# Patient Record
Sex: FEMALE | Race: WHITE | NOT HISPANIC OR LATINO | Employment: OTHER | URBAN - METROPOLITAN AREA
[De-identification: names, ages, dates, MRNs, and addresses within clinical notes are randomized per-mention and may not be internally consistent; named-entity substitution may affect disease eponyms.]

---

## 2023-04-25 ENCOUNTER — HOSPITAL ENCOUNTER (EMERGENCY)
Facility: CLINIC | Age: 73
Discharge: HOME | End: 2023-04-25
Attending: EMERGENCY MEDICINE | Admitting: EMERGENCY MEDICINE
Payer: MEDICARE

## 2023-04-25 ENCOUNTER — APPOINTMENT (OUTPATIENT)
Dept: CT IMAGING | Facility: CLINIC | Age: 73
End: 2023-04-25
Attending: EMERGENCY MEDICINE
Payer: MEDICARE

## 2023-04-25 ENCOUNTER — APPOINTMENT (OUTPATIENT)
Dept: RADIOLOGY | Facility: CLINIC | Age: 73
End: 2023-04-25
Attending: EMERGENCY MEDICINE
Payer: MEDICARE

## 2023-04-25 VITALS
SYSTOLIC BLOOD PRESSURE: 154 MMHG | WEIGHT: 143 LBS | TEMPERATURE: 99 F | DIASTOLIC BLOOD PRESSURE: 70 MMHG | HEIGHT: 63 IN | RESPIRATION RATE: 20 BRPM | HEART RATE: 72 BPM | OXYGEN SATURATION: 94 % | BODY MASS INDEX: 25.34 KG/M2

## 2023-04-25 DIAGNOSIS — J06.9 UPPER RESPIRATORY TRACT INFECTION, UNSPECIFIED TYPE: ICD-10-CM

## 2023-04-25 LAB
ALBUMIN UR-MCNC: NEGATIVE MG/DL
APPEARANCE UR: CLEAR
BILIRUB UR QL STRIP: NEGATIVE
COLOR UR AUTO: COLORLESS
ERYTHROCYTE [DISTWIDTH] IN BLOOD BY AUTOMATED COUNT: 13 % (ref 10–15)
FLUAV RNA SPEC QL NAA+PROBE: NEGATIVE
FLUBV RNA RESP QL NAA+PROBE: NEGATIVE
GLUCOSE UR STRIP-MCNC: NEGATIVE MG/DL
HCT VFR BLD AUTO: 37.3 % (ref 35–47)
HGB BLD-MCNC: 12.6 G/DL (ref 11.7–15.7)
HGB UR QL STRIP: NEGATIVE
KETONES UR STRIP-MCNC: NEGATIVE MG/DL
LEUKOCYTE ESTERASE UR QL STRIP: NEGATIVE
MCH RBC QN AUTO: 30.2 PG (ref 26.5–33)
MCHC RBC AUTO-ENTMCNC: 33.8 G/DL (ref 31.5–36.5)
MCV RBC AUTO: 89 FL (ref 78–100)
NITRATE UR QL: NEGATIVE
PH UR STRIP: 7.5 [PH] (ref 5–7)
PLATELET # BLD AUTO: 348 10E3/UL (ref 150–450)
RBC # BLD AUTO: 4.17 10E6/UL (ref 3.8–5.2)
RBC URINE: <1 /HPF
RSV RNA SPEC NAA+PROBE: NEGATIVE
SARS-COV-2 RNA RESP QL NAA+PROBE: NEGATIVE
SP GR UR STRIP: 1 (ref 1–1.03)
SQUAMOUS EPITHELIAL: <1 /HPF
TROPONIN I SERPL-MCNC: 0.01 NG/ML (ref 0–0.29)
UROBILINOGEN UR STRIP-MCNC: <2 MG/DL
WBC # BLD AUTO: 7.2 10E3/UL (ref 4–11)
WBC URINE: 3 /HPF

## 2023-04-25 PROCEDURE — C9803 HOPD COVID-19 SPEC COLLECT: HCPCS

## 2023-04-25 PROCEDURE — 96360 HYDRATION IV INFUSION INIT: CPT

## 2023-04-25 PROCEDURE — 36415 COLL VENOUS BLD VENIPUNCTURE: CPT | Performed by: EMERGENCY MEDICINE

## 2023-04-25 PROCEDURE — 85027 COMPLETE CBC AUTOMATED: CPT | Performed by: EMERGENCY MEDICINE

## 2023-04-25 PROCEDURE — 99285 EMERGENCY DEPT VISIT HI MDM: CPT | Mod: 25,CS

## 2023-04-25 PROCEDURE — G1010 CDSM STANSON: HCPCS

## 2023-04-25 PROCEDURE — 96361 HYDRATE IV INFUSION ADD-ON: CPT

## 2023-04-25 PROCEDURE — 93005 ELECTROCARDIOGRAM TRACING: CPT | Performed by: EMERGENCY MEDICINE

## 2023-04-25 PROCEDURE — 71046 X-RAY EXAM CHEST 2 VIEWS: CPT

## 2023-04-25 PROCEDURE — 87637 SARSCOV2&INF A&B&RSV AMP PRB: CPT | Performed by: EMERGENCY MEDICINE

## 2023-04-25 PROCEDURE — 258N000003 HC RX IP 258 OP 636: Performed by: EMERGENCY MEDICINE

## 2023-04-25 PROCEDURE — 84484 ASSAY OF TROPONIN QUANT: CPT | Performed by: EMERGENCY MEDICINE

## 2023-04-25 PROCEDURE — 81001 URINALYSIS AUTO W/SCOPE: CPT | Performed by: EMERGENCY MEDICINE

## 2023-04-25 RX ADMIN — SODIUM CHLORIDE 1000 ML: 9 INJECTION, SOLUTION INTRAVENOUS at 10:55

## 2023-04-25 ASSESSMENT — ACTIVITIES OF DAILY LIVING (ADL)
ADLS_ACUITY_SCORE: 35
ADLS_ACUITY_SCORE: 35

## 2023-04-25 NOTE — ED TRIAGE NOTES
"Arrives to ED accompanied by family with c/o increased confusion and weakness beginning. Dx with pneumonia yesterday. Pt has been unable to walk. Family reports \"she doesn't know what day it is\".      Triage Assessment     Row Name 04/25/23 1031       Triage Assessment (Adult)    Airway WDL WDL       Respiratory WDL    Respiratory WDL WDL       Skin Circulation/Temperature WDL    Skin Circulation/Temperature WDL WDL       Cardiac WDL    Cardiac WDL X;rhythm    Pulse Rate & Regularity tachycardic       Peripheral/Neurovascular WDL    Peripheral Neurovascular WDL WDL       Cognitive/Neuro/Behavioral WDL    Cognitive/Neuro/Behavioral WDL X  confusion       [place] Coma Scale    Best Eye Response 4-->(E4) spontaneous    Best Motor Response 6-->(M6) obeys commands    Best Verbal Response 4-->(V4) confused    [place] Coma Scale Score 14              "

## 2023-04-25 NOTE — ED PROVIDER NOTES
"EMERGENCY DEPARTMENT ENCOUNTER      NAME: Dotty Villanueva  AGE: 73 year old female  YOB: 1950  MRN: [id number]  EVALUATION DATE & TIME: 4/25/2023 10:36 AM    PCP: No primary care provider on file.    ED PROVIDER: Han Zamora MD      Chief Complaint   Patient presents with     Altered Mental Status     Generalized Weakness         FINAL IMPRESSION:  uri      ED COURSE & MEDICAL DECISION MAKING:    Pertinent Labs & Imaging studies reviewed. (See chart for details)  73 year old female presents to the Emergency Department for evaluation of weakness, confusion.  Patient arrives with her family member.  Per report patient has been ill since last Thursday.  Started having cough, anorexia.  Reports fevers to 100.9.  On Thursday did have a \"fall\" in the bathroom.  States she was on the floor for quite a while.  Denies any headaches or injuries.  Also reports being incontinent of urine on 4 occasions.  Denies any discomfort with urination.  Review of his records indicate patient was seen at [facility] yesterday.  Extensive laboratory evaluation and imaging performed.  Patient left prior to medical screening exam over.  Laboratory evaluation unremarkable other than minimally elevated creatinine.  However chest x-ray suggestive of potential lingular infiltrate.  Patient had started Zithromax on her own and cefuroxime added to her antibiotics.  Patient has not yet started the cefuroxime.  Patient also reports her blood pressure is down today.  She states her typical blood pressure is 145 systolic.  Today it was only 100.  However vital signs on the arrival with low-grade temperature elevation.  Patient with normal blood pressure with slight tachycardia.  Oxygen saturation normal at 94.  Patient with dry cough.  Examination otherwise reveals a well-nourished alert female in mild distress.  Minimal decreased breath sounds bilaterally.  No obvious rhonchi.  Minimal tachycardia.  No murmurs.  Abdomen soft and " nontender.  Neurologically patient alert and appropriate nonfocal.  Patient with likely febrile process.  Has had prior COVID and vaccination.  Possibility of repeat COVID versus influenza.  Swab will be obtained.  Chest x-ray being repeated to assess for advancement of infiltrate.  We will also repeat CBC for indications of further worsening of infection.  No indications for repeating comprehensive metabolic panel given normal panel yesterday.  CT imaging will also be of performed given her falls and reported weakness.  Intravenous fluids initiated 250 cc an hour of normal saline given anorexia and reported low blood pressure at home.  Family were worried about patient being somewhat confused however on exam patient is alert and appropriate nonfocal.  10:40 AM.  Initial interview and examination performed  11:28 AM.  Troponin normal.  CBC unremarkable.  CT imaging unremarkable.  Awaiting definitive report  12:05 PM.  COVID/influenza/RSV swab negative.  CT of the head with potential left maxillary sinusitis.  Chest x-ray is unremarkable.  No reported infiltrates consistent with pneumonia.  Awaiting urine analysis.  12:15 PM.  Patient informed of results.  Patient reports she is able to provide urine specimen.  1:04 PM.  UA is negative.  We will ambulate and assess for safety and oxygenation prior to determining disposition  1:24 PM.  Patient ambulated without difficulties.  Oxygenation remained excellent 94% we will proceed with plans for continued outpatient management given findings and patient's overall wellbeing.  At the conclusion of the encounter I discussed the results of all of the tests and the disposition. The questions were answered. The patient or family acknowledged understanding and was agreeable with the care plan.     Medical Decision Making    History:    Supplemental history from: Documented in chart, if applicable and Family Member/Significant Other    External Record(s) reviewed: Documented in  chart, if applicable.    Work Up:    Chart documentation includes differential considered and any EKGs or imaging independently interpreted by provider, where specified.    In additional to work up documented, I considered the following work up: Documented in chart, if applicable.    External consultation:    Discussion of management with another provider: MARGARITA    Complicating factors:    Care impacted by chronic illness: Hypertension, diabetes mellitus         Care affected by social determinants of health: N/A    Disposition considerations: Discharge. I recommended the patient continue their current prescription strength medication(s): Cefuroxime and Zithromax. I considered admission, but discharged patient after significant clinical improvement.      MEDICATIONS GIVEN IN THE EMERGENCY:  Medications   0.9% sodium chloride BOLUS (has no administration in time range)       NEW PRESCRIPTIONS STARTED AT TODAY'S ER VISIT  New Prescriptions    No medications on file          =================================================================    HPI        Dotty Villanueva is a 73 year old female with a pertinent history of diabetes, obesity with gastric banding, dyslipidemia presenting with reports of fevers and confusion.  Patient seen yesterday at another facility and diagnosed with pneumonia.  Had started on Zithromax.  Brought in by family over reports she seems to be confused.  Patient however reports she is feeling generally weak.  States she has not been eating well the last few days secondary general weakness.  Does report a slight cough and low-grade fevers.  Denies any vomiting or diarrhea.  Reports having a fall on Thursday after getting up in the middle night to use the bathroom.  Denies any obvious injuries.  Does state thereafter she has had urinary incontinence a few times.  Denies any low back pain or other weakness.     REVIEW OF SYSTEMS   Review of Systems positive fevers and chills and cough.  No  "vomiting, diarrhea.  Some incontinence of urine but no dysuria.  Remainder review of systems otherwise negative    PAST MEDICAL HISTORY:  History reviewed. No pertinent past medical history.    PAST SURGICAL HISTORY:  History reviewed. No pertinent surgical history.        CURRENT MEDICATIONS:    No current outpatient medications on file.      ALLERGIES:  Allergies   Allergen Reactions     Enalapril Cough     Penicillins Nausea and Vomiting     Morphine Rash     And itching       FAMILY HISTORY:  History reviewed. No pertinent family history.    SOCIAL HISTORY:   Social History     Socioeconomic History     Marital status:      Spouse name: None     Number of children: None     Years of education: None     Highest education level: None       VITALS:  /89   Pulse 103   Temp 99  F (37.2  C) (Temporal)   Resp 20   Ht 1.6 m (5' 3\")   Wt 64.9 kg (143 lb)   SpO2 94%   BMI 25.33 kg/m      PHYSICAL EXAM    Constitutional: Well developed, Well nourished, NAD  HENT: Normocephalic, Atraumatic, Bilateral external ears normal, Oropharynx normal, mucous membranes moist, Nose normal. Neck-  Normal range of motion, No tenderness, Supple, No stridor.    Eyes: PERRL, EOMI, Conjunctiva normal, No discharge.   Respiratory: Normal breath sounds, No respiratory distress, No wheezing, Speaks full sentences easily.  Minimal dry cough  Cardiovascular: Normal heart rate, Regular rhythm,  No murmurs, No rubs, No gallops. Chest wall nontender.    GI: No excessive obesity. Bowel sounds normal, Soft, No tenderness, No masses, No flank tenderness. No rebound or guarding.    Musculoskeletal: No edema.  No cyanosis, No clubbing. Good range of motion in all major joints. No tenderness to palpation or major deformities noted  Integument: Warm, Dry, No erythema, No rash. No petechiae.   Neurologic: Alert & oriented x 3, Normal motor function, Normal sensory function, No focal deficits noted. Normal gait.Psychiatric: Affect normal, " Judgment normal, Mood normal. Cooperative.    LAB:  All pertinent labs reviewed and interpreted.     Results for orders placed or performed during the hospital encounter of 04/25/23   Chest XR,  PA & LAT     Status: None    Narrative    EXAM: XR CHEST 2 VIEWS  LOCATION: [facility]  DATE/TIME: 4/25/2023 11:37 AM CDT    INDICATION: Possible lingular infiltrate on chest x ray  COMPARISON: Frontal and lateral views of the chest 04/24/2023      Impression    IMPRESSION:     The lungs are symmetrically inflated and clear.    Cardiac silhouette and mediastinal borders are normal.    Disc space narrowing and flowing degenerative osteophytes through the thoracic spine. No focal vertebral compression deformity.    No pleural effusion.   Head CT w/o contrast     Status: None    Narrative    EXAM: CT HEAD W/O CONTRAST  LOCATION: [facility]  DATE/TIME: 4/25/2023 11:25 AM CDT    INDICATION: Weakness and confusion  COMPARISON: None.  TECHNIQUE: Routine CT Head without IV contrast. Multiplanar reformats. Dose reduction techniques were used.    FINDINGS:  INTRACRANIAL CONTENTS: There is no evidence of acute intracranial hemorrhage, acute large territorial infarction, or mass lesion. There is mild scattered nonspecific hypodensity in the cerebral white matter.    The ventricles, sulci, and cisterns are within normal limits for age. There is no evidence of hydrocephalus.    VISUALIZED ORBITS/SINUSES/MASTOIDS: No intraorbital abnormality. Note is made of bilateral lens implants. There is moderate opacification of the paranasal sinuses, most pronounced in the left maxillary sinus. Aerated secretions in the left maxillary   sinus could represent sinusitis in the appropriate clinical setting. No middle ear or mastoid effusion.    BONES/SOFT TISSUES: No acute abnormality.      Impression    IMPRESSION:  1.  No acute intracranial abnormality.  2.  Aerated secretions and mucosal thickening  in the left maxillary sinus could represent sinusitis in the appropriate clinical setting.   UA with Microscopic reflex to Culture     Status: Abnormal    Specimen: Urine, Midstream   Result Value Ref Range    Color Urine Colorless Colorless, Straw, Light Yellow, Yellow    Appearance Urine Clear Clear    Glucose Urine Negative Negative mg/dL    Bilirubin Urine Negative Negative    Ketones Urine Negative Negative mg/dL    Specific Gravity Urine 1.004 1.001 - 1.030    Blood Urine Negative Negative    pH Urine 7.5 (H) 5.0 - 7.0    Protein Albumin Urine Negative Negative mg/dL    Urobilinogen Urine <2.0 <2.0 mg/dL    Nitrite Urine Negative Negative    Leukocyte Esterase Urine Negative Negative    RBC Urine <1 <=2 /HPF    WBC Urine 3 <=5 /HPF    Squamous Epithelials Urine <1 <=1 /HPF    Narrative    Urine Culture not indicated   CBC (+ platelets, no diff)     Status: Normal   Result Value Ref Range    WBC Count 7.2 4.0 - 11.0 10e3/uL    RBC Count 4.17 3.80 - 5.20 10e6/uL    Hemoglobin 12.6 11.7 - 15.7 g/dL    Hematocrit 37.3 35.0 - 47.0 %    MCV 89 78 - 100 fL    MCH 30.2 26.5 - 33.0 pg    MCHC 33.8 31.5 - 36.5 g/dL    RDW 13.0 10.0 - 15.0 %    Platelet Count 348 150 - 450 10e3/uL   Troponin I     Status: Normal   Result Value Ref Range    Troponin I 0.01 0.00 - 0.29 ng/mL   Symptomatic Influenza A/B, RSV, & SARS-CoV2 PCR (COVID-19) Nasopharyngeal     Status: Normal    Specimen: Nasopharyngeal; Swab   Result Value Ref Range    Influenza A PCR Negative Negative    Influenza B PCR Negative Negative    RSV PCR Negative Negative    SARS CoV2 PCR Negative Negative    Narrative    Testing was performed using the Xpert Xpress CoV2/Flu/RSV Assay on the [employer] GeneXpert Instrument. This test should be ordered for the detection of SARS-CoV-2, influenza, and RSV viruses in individuals who meet clinical and/or epidemiological criteria. Test performance is unknown in asymptomatic patients. This test is for in vitro diagnostic use  under the FDA EUA for laboratories certified under CLIA to perform high or moderate complexity testing. This test has not been FDA cleared or approved. A negative result does not rule out the presence of PCR inhibitors in the specimen or target RNA in concentration below the limit of detection for the assay. If only one viral target is positive but coinfection with multiple targets is suspected, the sample should be re-tested with another FDA cleared, approved, or authorized test, if coinfection would change clinical management. This test was validated by the [facility]. These laboratories are certified under the Clinical Laboratory Improvement Amendments of 1988 (CLIA-88) as qualified to perform high complexity laboratory testing.     RADIOLOGY:  Reviewed all pertinent imaging. Please see official radiology report.  Chest XR,  PA & LAT    (Results Pending)   Head CT w/o contrast    (Results Pending)         Han Zamora MD  [facility] EMERGENCY ROOM  [address] [id number]  [phone]     Han Zamora MD  04/25/23 1531

## 2023-10-30 ENCOUNTER — APPOINTMENT (OUTPATIENT)
Dept: CT IMAGING | Facility: CLINIC | Age: 73
DRG: 074 | End: 2023-10-30
Attending: PHYSICIAN ASSISTANT
Payer: MEDICARE

## 2023-10-30 ENCOUNTER — HOSPITAL ENCOUNTER (INPATIENT)
Facility: CLINIC | Age: 73
LOS: 2 days | Discharge: HOME | DRG: 074 | End: 2023-11-02
Attending: EMERGENCY MEDICINE | Admitting: FAMILY MEDICINE
Payer: MEDICARE

## 2023-10-30 DIAGNOSIS — K31.84 GASTROPARESIS: ICD-10-CM

## 2023-10-30 DIAGNOSIS — R68.81 EARLY SATIETY: ICD-10-CM

## 2023-10-30 DIAGNOSIS — K31.1 GASTRIC OUTLET OBSTRUCTION: ICD-10-CM

## 2023-10-30 DIAGNOSIS — R11.2 NAUSEA AND VOMITING: ICD-10-CM

## 2023-10-30 DIAGNOSIS — K20.90 ESOPHAGITIS: Primary | ICD-10-CM

## 2023-10-30 PROBLEM — E05.20 TOXIC MULTINODULAR GOITER: Status: ACTIVE | Noted: 2023-10-30

## 2023-10-30 PROBLEM — C44.90 MALIGNANT NEOPLASM OF SKIN: Status: ACTIVE | Noted: 2018-07-18

## 2023-10-30 PROBLEM — N20.0 RENAL CALCULI: Status: ACTIVE | Noted: 2017-04-26

## 2023-10-30 PROBLEM — R63.4 ABNORMAL WEIGHT LOSS: Status: ACTIVE | Noted: 2023-08-04

## 2023-10-30 PROBLEM — E11.9 TYPE 2 DIABETES MELLITUS (H): Status: ACTIVE | Noted: 2023-10-30

## 2023-10-30 PROBLEM — N13.30 HYDRONEPHROSIS OF LEFT KIDNEY: Status: ACTIVE | Noted: 2017-04-26

## 2023-10-30 PROBLEM — M85.80 OSTEOPENIA: Status: ACTIVE | Noted: 2023-10-30

## 2023-10-30 PROBLEM — K21.9 GERD (GASTROESOPHAGEAL REFLUX DISEASE): Status: ACTIVE | Noted: 2023-10-30

## 2023-10-30 PROBLEM — N18.30 STAGE 3 CHRONIC KIDNEY DISEASE (H): Status: ACTIVE | Noted: 2023-10-30

## 2023-10-30 LAB
ALBUMIN SERPL BCG-MCNC: 3.5 G/DL (ref 3.5–5.2)
ALP SERPL-CCNC: 84 U/L (ref 35–104)
ALT SERPL W P-5'-P-CCNC: 10 U/L (ref 0–50)
ANION GAP SERPL CALCULATED.3IONS-SCNC: 7 MMOL/L (ref 7–15)
AST SERPL W P-5'-P-CCNC: 19 U/L (ref 0–45)
BASOPHILS # BLD AUTO: 0.1 10E3/UL (ref 0–0.2)
BASOPHILS NFR BLD AUTO: 1 %
BILIRUB DIRECT SERPL-MCNC: <0.2 MG/DL (ref 0–0.3)
BILIRUB SERPL-MCNC: 0.4 MG/DL
BUN SERPL-MCNC: 5.9 MG/DL (ref 8–23)
CALCIUM SERPL-MCNC: 10.5 MG/DL (ref 8.8–10.2)
CHLORIDE SERPL-SCNC: 104 MMOL/L (ref 98–107)
CREAT SERPL-MCNC: 0.88 MG/DL (ref 0.51–0.95)
DEPRECATED HCO3 PLAS-SCNC: 32 MMOL/L (ref 22–29)
EGFRCR SERPLBLD CKD-EPI 2021: 69 ML/MIN/1.73M2
EOSINOPHIL # BLD AUTO: 0.1 10E3/UL (ref 0–0.7)
EOSINOPHIL NFR BLD AUTO: 1 %
ERYTHROCYTE [DISTWIDTH] IN BLOOD BY AUTOMATED COUNT: 12.8 % (ref 10–15)
GLUCOSE BLDC GLUCOMTR-MCNC: 77 MG/DL (ref 70–99)
GLUCOSE SERPL-MCNC: 79 MG/DL (ref 70–99)
HBA1C MFR BLD: 6.5 %
HCT VFR BLD AUTO: 41.5 % (ref 35–47)
HGB BLD-MCNC: 13.4 G/DL (ref 11.7–15.7)
IMM GRANULOCYTES # BLD: 0 10E3/UL
IMM GRANULOCYTES NFR BLD: 0 %
LIPASE SERPL-CCNC: 13 U/L (ref 13–60)
LYMPHOCYTES # BLD AUTO: 1.6 10E3/UL (ref 0.8–5.3)
LYMPHOCYTES NFR BLD AUTO: 18 %
MCH RBC QN AUTO: 29.1 PG (ref 26.5–33)
MCHC RBC AUTO-ENTMCNC: 32.3 G/DL (ref 31.5–36.5)
MCV RBC AUTO: 90 FL (ref 78–100)
MONOCYTES # BLD AUTO: 0.5 10E3/UL (ref 0–1.3)
MONOCYTES NFR BLD AUTO: 5 %
NEUTROPHILS # BLD AUTO: 6.5 10E3/UL (ref 1.6–8.3)
NEUTROPHILS NFR BLD AUTO: 75 %
NRBC # BLD AUTO: 0 10E3/UL
NRBC BLD AUTO-RTO: 0 /100
PLATELET # BLD AUTO: 418 10E3/UL (ref 150–450)
POTASSIUM SERPL-SCNC: 3.1 MMOL/L (ref 3.4–5.3)
POTASSIUM SERPL-SCNC: 3.6 MMOL/L (ref 3.4–5.3)
PROT SERPL-MCNC: 6.1 G/DL (ref 6.4–8.3)
RBC # BLD AUTO: 4.6 10E6/UL (ref 3.8–5.2)
SODIUM SERPL-SCNC: 143 MMOL/L (ref 135–145)
WBC # BLD AUTO: 8.7 10E3/UL (ref 4–11)

## 2023-10-30 PROCEDURE — 83036 HEMOGLOBIN GLYCOSYLATED A1C: CPT | Performed by: FAMILY MEDICINE

## 2023-10-30 PROCEDURE — 83690 ASSAY OF LIPASE: CPT | Performed by: PHYSICIAN ASSISTANT

## 2023-10-30 PROCEDURE — 74177 CT ABD & PELVIS W/CONTRAST: CPT | Mod: MG

## 2023-10-30 PROCEDURE — 82248 BILIRUBIN DIRECT: CPT | Performed by: PHYSICIAN ASSISTANT

## 2023-10-30 PROCEDURE — 85014 HEMATOCRIT: CPT | Performed by: PHYSICIAN ASSISTANT

## 2023-10-30 PROCEDURE — 36415 COLL VENOUS BLD VENIPUNCTURE: CPT | Performed by: EMERGENCY MEDICINE

## 2023-10-30 PROCEDURE — 96366 THER/PROPH/DIAG IV INF ADDON: CPT

## 2023-10-30 PROCEDURE — 82310 ASSAY OF CALCIUM: CPT | Performed by: PHYSICIAN ASSISTANT

## 2023-10-30 PROCEDURE — G1010 CDSM STANSON: HCPCS

## 2023-10-30 PROCEDURE — 250N000011 HC RX IP 250 OP 636: Mod: JZ | Performed by: FAMILY MEDICINE

## 2023-10-30 PROCEDURE — 96365 THER/PROPH/DIAG IV INF INIT: CPT

## 2023-10-30 PROCEDURE — C9113 INJ PANTOPRAZOLE SODIUM, VIA: HCPCS | Mod: JZ | Performed by: FAMILY MEDICINE

## 2023-10-30 PROCEDURE — 99285 EMERGENCY DEPT VISIT HI MDM: CPT | Mod: 25

## 2023-10-30 PROCEDURE — 82962 GLUCOSE BLOOD TEST: CPT

## 2023-10-30 PROCEDURE — 84132 ASSAY OF SERUM POTASSIUM: CPT | Performed by: EMERGENCY MEDICINE

## 2023-10-30 PROCEDURE — 250N000011 HC RX IP 250 OP 636: Performed by: PHYSICIAN ASSISTANT

## 2023-10-30 PROCEDURE — 96375 TX/PRO/DX INJ NEW DRUG ADDON: CPT

## 2023-10-30 PROCEDURE — 99223 1ST HOSP IP/OBS HIGH 75: CPT | Performed by: FAMILY MEDICINE

## 2023-10-30 PROCEDURE — G0378 HOSPITAL OBSERVATION PER HR: HCPCS

## 2023-10-30 PROCEDURE — 250N000013 HC RX MED GY IP 250 OP 250 PS 637: Performed by: FAMILY MEDICINE

## 2023-10-30 PROCEDURE — 250N000011 HC RX IP 250 OP 636: Mod: JZ | Performed by: EMERGENCY MEDICINE

## 2023-10-30 PROCEDURE — 36415 COLL VENOUS BLD VENIPUNCTURE: CPT | Performed by: PHYSICIAN ASSISTANT

## 2023-10-30 RX ORDER — SUCRALFATE ORAL 1 G/10ML
1 SUSPENSION ORAL
Status: DISCONTINUED | OUTPATIENT
Start: 2023-10-30 | End: 2023-11-02 | Stop reason: HOSPADM

## 2023-10-30 RX ORDER — POTASSIUM CHLORIDE 1500 MG/1
40 TABLET, EXTENDED RELEASE ORAL ONCE
Status: DISCONTINUED | OUTPATIENT
Start: 2023-10-30 | End: 2023-10-30

## 2023-10-30 RX ORDER — NICOTINE POLACRILEX 4 MG
15-30 LOZENGE BUCCAL
Status: DISCONTINUED | OUTPATIENT
Start: 2023-10-30 | End: 2023-11-02 | Stop reason: HOSPADM

## 2023-10-30 RX ORDER — IOPAMIDOL 755 MG/ML
100 INJECTION, SOLUTION INTRAVASCULAR ONCE
Status: COMPLETED | OUTPATIENT
Start: 2023-10-30 | End: 2023-10-30

## 2023-10-30 RX ORDER — UBIDECARENONE 100 MG
100 CAPSULE ORAL
COMMUNITY

## 2023-10-30 RX ORDER — ACETAMINOPHEN 325 MG/1
650 TABLET ORAL EVERY 6 HOURS PRN
Status: DISCONTINUED | OUTPATIENT
Start: 2023-10-30 | End: 2023-11-02 | Stop reason: HOSPADM

## 2023-10-30 RX ORDER — ASPIRIN 81 MG/1
81 TABLET, CHEWABLE ORAL DAILY
Status: DISCONTINUED | OUTPATIENT
Start: 2023-10-30 | End: 2023-11-02 | Stop reason: HOSPADM

## 2023-10-30 RX ORDER — BENAZEPRIL HYDROCHLORIDE 40 MG/1
40 TABLET ORAL DAILY
COMMUNITY

## 2023-10-30 RX ORDER — LISINOPRIL 20 MG/1
40 TABLET ORAL DAILY
Status: DISCONTINUED | OUTPATIENT
Start: 2023-10-30 | End: 2023-11-02 | Stop reason: HOSPADM

## 2023-10-30 RX ORDER — AMLODIPINE BESYLATE 5 MG/1
5 TABLET ORAL DAILY
COMMUNITY

## 2023-10-30 RX ORDER — SUCRALFATE ORAL 1 G/10ML
1 SUSPENSION ORAL
COMMUNITY
End: 2024-04-03

## 2023-10-30 RX ORDER — ACETAMINOPHEN 650 MG/1
650 SUPPOSITORY RECTAL EVERY 6 HOURS PRN
Status: DISCONTINUED | OUTPATIENT
Start: 2023-10-30 | End: 2023-11-02 | Stop reason: HOSPADM

## 2023-10-30 RX ORDER — CYANOCOBALAMIN 1000 UG/ML
1 INJECTION, SOLUTION INTRAMUSCULAR; SUBCUTANEOUS
COMMUNITY

## 2023-10-30 RX ORDER — AMLODIPINE BESYLATE 5 MG/1
5 TABLET ORAL
Status: DISCONTINUED | OUTPATIENT
Start: 2023-10-30 | End: 2023-11-02 | Stop reason: HOSPADM

## 2023-10-30 RX ORDER — OMEPRAZOLE 40 MG/1
40 CAPSULE, DELAYED RELEASE ORAL
Status: ON HOLD | COMMUNITY
End: 2023-11-02

## 2023-10-30 RX ORDER — ZINC GLUCONATE 50 MG
50 TABLET ORAL
Status: ON HOLD | COMMUNITY
End: 2024-03-11

## 2023-10-30 RX ORDER — ONDANSETRON 2 MG/ML
4 INJECTION INTRAMUSCULAR; INTRAVENOUS EVERY 6 HOURS PRN
Status: DISCONTINUED | OUTPATIENT
Start: 2023-10-30 | End: 2023-11-02 | Stop reason: HOSPADM

## 2023-10-30 RX ORDER — PEDIATRIC MULTIVITAMIN NO.17
1 TABLET,CHEWABLE ORAL
Status: ON HOLD | COMMUNITY
End: 2024-03-11

## 2023-10-30 RX ORDER — ATORVASTATIN CALCIUM 20 MG/1
60 TABLET, FILM COATED ORAL EVERY EVENING
COMMUNITY

## 2023-10-30 RX ORDER — GLIMEPIRIDE 2 MG/1
2 TABLET ORAL
Status: DISCONTINUED | OUTPATIENT
Start: 2023-10-30 | End: 2023-11-02 | Stop reason: HOSPADM

## 2023-10-30 RX ORDER — POTASSIUM CHLORIDE 7.45 MG/ML
10 INJECTION INTRAVENOUS
Status: DISCONTINUED | OUTPATIENT
Start: 2023-10-30 | End: 2023-10-30

## 2023-10-30 RX ORDER — POTASSIUM CHLORIDE 7.45 MG/ML
10 INJECTION INTRAVENOUS
Status: COMPLETED | OUTPATIENT
Start: 2023-10-30 | End: 2023-10-30

## 2023-10-30 RX ORDER — GLIMEPIRIDE 2 MG/1
2 TABLET ORAL
COMMUNITY

## 2023-10-30 RX ORDER — DEXTROSE MONOHYDRATE 25 G/50ML
25-50 INJECTION, SOLUTION INTRAVENOUS
Status: DISCONTINUED | OUTPATIENT
Start: 2023-10-30 | End: 2023-11-02 | Stop reason: HOSPADM

## 2023-10-30 RX ORDER — TRAZODONE HYDROCHLORIDE 50 MG/1
100 TABLET, FILM COATED ORAL
Status: DISCONTINUED | OUTPATIENT
Start: 2023-10-30 | End: 2023-11-02 | Stop reason: HOSPADM

## 2023-10-30 RX ORDER — SODIUM CHLORIDE AND POTASSIUM CHLORIDE 150; 900 MG/100ML; MG/100ML
INJECTION, SOLUTION INTRAVENOUS CONTINUOUS
Status: DISCONTINUED | OUTPATIENT
Start: 2023-10-30 | End: 2023-10-31

## 2023-10-30 RX ORDER — ONDANSETRON 4 MG/1
4 TABLET, ORALLY DISINTEGRATING ORAL EVERY 6 HOURS PRN
Status: DISCONTINUED | OUTPATIENT
Start: 2023-10-30 | End: 2023-11-02 | Stop reason: HOSPADM

## 2023-10-30 RX ORDER — HYDRALAZINE HYDROCHLORIDE 20 MG/ML
10 INJECTION INTRAMUSCULAR; INTRAVENOUS EVERY 6 HOURS PRN
Status: DISCONTINUED | OUTPATIENT
Start: 2023-10-30 | End: 2023-11-02 | Stop reason: HOSPADM

## 2023-10-30 RX ORDER — TRAZODONE HYDROCHLORIDE 100 MG/1
100 TABLET ORAL
COMMUNITY

## 2023-10-30 RX ORDER — ASPIRIN 81 MG/1
81 TABLET, CHEWABLE ORAL DAILY
COMMUNITY

## 2023-10-30 RX ADMIN — POTASSIUM CHLORIDE 10 MEQ: 7.46 INJECTION, SOLUTION INTRAVENOUS at 16:28

## 2023-10-30 RX ADMIN — LISINOPRIL 40 MG: 10 TABLET ORAL at 19:19

## 2023-10-30 RX ADMIN — POTASSIUM CHLORIDE 10 MEQ: 7.46 INJECTION, SOLUTION INTRAVENOUS at 17:54

## 2023-10-30 RX ADMIN — IOPAMIDOL 100 ML: 755 INJECTION, SOLUTION INTRAVENOUS at 15:31

## 2023-10-30 RX ADMIN — POTASSIUM CHLORIDE 10 MEQ: 7.46 INJECTION, SOLUTION INTRAVENOUS at 20:33

## 2023-10-30 RX ADMIN — POTASSIUM CHLORIDE 10 MEQ: 7.46 INJECTION, SOLUTION INTRAVENOUS at 19:15

## 2023-10-30 RX ADMIN — AMLODIPINE BESYLATE 5 MG: 5 TABLET ORAL at 19:24

## 2023-10-30 RX ADMIN — PANTOPRAZOLE SODIUM 40 MG: 40 INJECTION, POWDER, FOR SOLUTION INTRAVENOUS at 19:29

## 2023-10-30 RX ADMIN — POTASSIUM CHLORIDE AND SODIUM CHLORIDE: 900; 150 INJECTION, SOLUTION INTRAVENOUS at 19:17

## 2023-10-30 ASSESSMENT — ACTIVITIES OF DAILY LIVING (ADL)
ADLS_ACUITY_SCORE: 35

## 2023-10-30 ASSESSMENT — ENCOUNTER SYMPTOMS
DYSURIA: 0
ABDOMINAL PAIN: 0
SHORTNESS OF BREATH: 0
VOMITING: 1
COUGH: 0
DIARRHEA: 0
APPETITE CHANGE: 1
FEVER: 0
NAUSEA: 0

## 2023-10-30 NOTE — ED PROVIDER NOTES
EMERGENCY DEPARTMENT ENCOUNTER      NAME: Dotty Villanueva  AGE: 73 year old female  YOB: 1950  MRN: [id number]  EVALUATION DATE & TIME: 10/30/2023  3:13 PM    PCP: Lilia Doyle    ED PROVIDER: Hue Ronquillo M.D.        Chief Complaint   Patient presents with    Post Precedure Problem         FINAL IMPRESSION:    1. Early satiety    2. Nausea and vomiting            MEDICAL DECISION MAKING:    Dotty Villanueva is a 73 year old female with history of hypertension, osteopenia, CKD, type 2 diabetes who presents to the ER with complaints of abdominal pain and early satiety.  Patient states she can only eat a couple tablespoons of food before she gets full and then vomits.  Tried to have an outpatient EGD but there was difficulty and concerns for gastric outlet obstruction.  Referred to the ER for further evaluation.  Ultimately GI recommended admission to the hospital for nuclear medicine study.  Patient excepted to the hospital by the hospitalist service and will go to [facility] under observation status.    Please see Crista Blanc PA-C note for further details.      ED COURSE:  4:12 PM  I met with the patient to gather history and perform my exam. ED course and treatment discussed.    4:34 PM  Patient has been admitted to the hospitalist service by Crista Blanc PA-C and will go to [facility] under observation status.  Patient is otherwise hemodynamically stable.    I do not think that this represents ACS, PE, ruptured AAA, aortic dissection, bowel obstruction, bowel ischemia, cholecystitis, pancreatitis, appendicitis, diverticulitis, kidney stone, pyelonephritis, incarcerated or strangulated hernia, ovarian/testicular torsion, PID, ectopic pregnancy, tubo-ovarian abscess, viscus perforation, perforated GI ulcer, or other such etiologies at this time.    At the conclusion of the encounter the results of all of the tests and the disposition were discussed. Their questions were answered. The  "patient (and any family present) acknowledged understanding and were agreeable with the care plan.      CONSULTANTS:  [facility] - see PA note for details        MEDICATIONS GIVEN IN THE EMERGENCY:  Medications   potassium chloride 10 mEq in 100 mL sterile water infusion (10 mEq Intravenous $New Bag 10/30/23 9067)   iopamidol (ISOVUE-370) solution 100 mL (100 mLs Intravenous $Given 10/30/23 1531)           NEW PRESCRIPTIONS STARTED AT TODAY'S ER VISIT:     Medication List      There are no discharge medications for this visit.             CONDITION:  stable        DISPOSITION:  Med surg obs as accepted by Dr. Hidalgo, hospitalist         =================================================================  =================================================================  TRIAGE ASSESSMENT:  Patient presents to ED after having an endoscopy today that could not be completed as patient still had food in her stomach, even though the last time that she had solid food was on Thursday, this also occurred in September.  Pt sent here for workup, since April pt has had intermittent abdominal pain.  Lilo Pugh RN.......10/30/2023 1:16 PM     Triage Assessment (Adult)       Row Name 10/30/23 1315          Triage Assessment    Airway WDL WDL        Respiratory WDL    Respiratory WDL WDL        Skin Circulation/Temperature WDL    Skin Circulation/Temperature WDL WDL        Cardiac WDL    Cardiac WDL WDL        Peripheral/Neurovascular WDL    Peripheral Neurovascular WDL WDL        Cognitive/Neuro/Behavioral WDL    Cognitive/Neuro/Behavioral WDL WDL                            ED Triage Vitals [10/30/23 1314]   Enc Vitals Group      BP (!) 189/96      Pulse 85      Resp 16      Temp 98  F (36.7  C)      Temp src Oral      SpO2 99 %      Weight 64 kg (141 lb)      Height 1.6 m (5' 3\")        ================================================================  ================================================================          I am " seeing this patient along with Crista Blanc PA-C    IHue MD have reviewed the documentation, personally taken the patient's history, performed an exam and agree with the physical findings, diagnosis and management plan.      HPI    Patient information was obtained from: patient    Use of Intrepreter: N/A     Dotty Villanueva is a 73 year old female with history of hypertension, osteopenia, CKD, type 2 diabetes who presents to the ER with complaints of abdominal pain and early satiety.  She had followed up with gastroenterology today for an EGD and they were unable to successfully pass the scope.  They are concerned about gastric emptying issues and have recommend the patient be admitted to the hospital for nuclear medicine studies.     Please see Crista Blanc PA-C ER note for further details of rest of ED workup and discussions with GI.        REVIEW OF SYSTEMS  Review of Systems   Constitutional:  Positive for appetite change. Negative for fever.   Respiratory:  Negative for cough and shortness of breath.    Cardiovascular:  Negative for chest pain.   Gastrointestinal:  Positive for vomiting (when she eats more than a few tablespoons of food). Negative for abdominal pain, diarrhea and nausea.   Genitourinary:  Negative for dysuria.   All other systems reviewed and are negative.        PAST MEDICAL HISTORY:  Past Medical History:   Diagnosis Date    Esophagitis     Essential hypertension     Hyperlipidemia     Malignant neoplasm of skin 07/18/2018    Formatting of this note might be different from the original. 7/2018- left chin, SCCIS- Narrow negative margins. Observe    Pulmonary nodules 02/18/2016    Formatting of this note might be different from the original. Stable on CT for 24 months, per [facility], no follow up needed 1/2016    Renal calculi 04/26/2017    Stage 3 chronic kidney disease (H) 10/30/2023    Formatting of this note might be different from the original. 3a    Type 2  "diabetes mellitus (H)          PAST SURGICAL HISTORY:  Past Surgical History:   Procedure Laterality Date    CHOLECYSTECTOMY      HYSTERECTOMY      LAPAROSCOPIC GASTRIC BANDING  2000         CURRENT MEDICATIONS:    Prior to Admission medications    Not on File         ALLERGIES:  Allergies   Allergen Reactions    Enalapril Cough    Penicillins Nausea and Vomiting    Morphine Rash     And itching         FAMILY HISTORY:  History reviewed. No pertinent family history.      SOCIAL HISTORY:  Social History     Socioeconomic History    Marital status: Single         VITALS:  Patient Vitals for the past 24 hrs:   BP Temp Temp src Pulse Resp SpO2 Height Weight   10/30/23 1630 (!) 186/88 -- -- 69 16 99 % -- --   10/30/23 1515 (!) 205/89 -- -- 71 -- 98 % -- --   10/30/23 1314 (!) 189/96 98  F (36.7  C) Oral 85 16 99 % 1.6 m (5' 3\") 64 kg (141 lb)       Wt Readings from Last 3 Encounters:   10/30/23 64 kg (141 lb)   04/25/23 64.9 kg (143 lb)       Estimated Creatinine Clearance: 51.2 mL/min (based on SCr of 0.88 mg/dL).      PHYSICAL EXAM    Constitutional:  Well developed, Well nourished, NAD, GCS 15  HENT:  Normocephalic, Atraumatic, Bilateral external ears normal, Nose normal. Neck- Supple, No stridor.   Eyes:  PERRL, EOMI, Conjunctiva normal, No discharge.  Respiratory:  Normal breath sounds, No respiratory distress, No wheezing, Speaks full sentences easily. No cough.   Cardiovascular:  Normal heart rate, Regular rhythm, No rubs, No gallops. Chest wall nontender.   GI:  No excessive obesity.  Bowel sounds normal, Soft, No tenderness, No masses, No flank tenderness. No rebound or guarding.   : deferred  Musculoskeletal:  No cyanosis, No clubbing. Good range of motion in all major joints. No major deformities noted.   Integument:  Warm, Dry, No erythema, No rash.  No petechiae.   Neurologic:  Alert & oriented x 3  Psychiatric:  Affect normal, Cooperative        LAB:  All pertinent labs reviewed and interpreted.  Recent " "Results (from the past 24 hour(s))   Basic metabolic panel    Collection Time: 10/30/23  3:00 PM   Result Value Ref Range    Sodium 143 135 - 145 mmol/L    Potassium 3.1 (L) 3.4 - 5.3 mmol/L    Chloride 104 98 - 107 mmol/L    Carbon Dioxide (CO2) 32 (H) 22 - 29 mmol/L    Anion Gap 7 7 - 15 mmol/L    Urea Nitrogen 5.9 (L) 8.0 - 23.0 mg/dL    Creatinine 0.88 0.51 - 0.95 mg/dL    GFR Estimate 69 >60 mL/min/1.73m2    Calcium 10.5 (H) 8.8 - 10.2 mg/dL    Glucose 79 70 - 99 mg/dL   Hepatic function panel    Collection Time: 10/30/23  3:00 PM   Result Value Ref Range    Protein Total 6.1 (L) 6.4 - 8.3 g/dL    Albumin 3.5 3.5 - 5.2 g/dL    Bilirubin Total 0.4 <=1.2 mg/dL    Alkaline Phosphatase 84 35 - 104 U/L    AST 19 0 - 45 U/L    ALT 10 0 - 50 U/L    Bilirubin Direct <0.20 0.00 - 0.30 mg/dL   Lipase    Collection Time: 10/30/23  3:00 PM   Result Value Ref Range    Lipase 13 13 - 60 U/L   CBC with platelets and differential    Collection Time: 10/30/23  3:00 PM   Result Value Ref Range    WBC Count 8.7 4.0 - 11.0 10e3/uL    RBC Count 4.60 3.80 - 5.20 10e6/uL    Hemoglobin 13.4 11.7 - 15.7 g/dL    Hematocrit 41.5 35.0 - 47.0 %    MCV 90 78 - 100 fL    MCH 29.1 26.5 - 33.0 pg    MCHC 32.3 31.5 - 36.5 g/dL    RDW 12.8 10.0 - 15.0 %    Platelet Count 418 150 - 450 10e3/uL    % Neutrophils 75 %    % Lymphocytes 18 %    % Monocytes 5 %    % Eosinophils 1 %    % Basophils 1 %    % Immature Granulocytes 0 %    NRBCs per 100 WBC 0 <1 /100    Absolute Neutrophils 6.5 1.6 - 8.3 10e3/uL    Absolute Lymphocytes 1.6 0.8 - 5.3 10e3/uL    Absolute Monocytes 0.5 0.0 - 1.3 10e3/uL    Absolute Eosinophils 0.1 0.0 - 0.7 10e3/uL    Absolute Basophils 0.1 0.0 - 0.2 10e3/uL    Absolute Immature Granulocytes 0.0 <=0.4 10e3/uL    Absolute NRBCs 0.0 10e3/uL       No results found for: \"ABORH\"        RADIOLOGY:  Reviewed all pertinent imaging. Please see official radiology report.    CT Abdomen Pelvis w Contrast   Final Result   IMPRESSION:    1.  " There is a mildly obstructing stone left renal pelvis measures 1.8 cm with some thickening of the renal pelvis consistent with chronic irritation.      2.  No other significant findings.      3.  Postop change in the stomach.      NM Gastric Emptying    (Results Pending)         EKG:    none      PROCEDURES:  none    Medical Decision Making    History:  Supplemental history from: Documented in chart, if applicable  External Record(s) reviewed: Documented in chart, if applicable.    Work Up:  Chart documentation includes differential considered and any EKGs or imaging independently interpreted by provider, where specified.  In additional to work up documented, I considered the following work up: Documented in chart, if applicable.    External consultation:  Discussion of management with another provider: Documented in chart, if applicable    Complicating factors:  Care impacted by chronic illness: Hypertension  Care affected by social determinants of health: N/A    Disposition considerations: Admit.          I personally saw the patient and performed a substantive portion of the visit including all aspects of the medical decision making.      Hue Ronquillo M.D. [facility]  Emergency Medicine and Medical Toxicology  Formerly [facility] EMERGENCY ROOM  [id number] [id number] [phone]  Dept: [phone]           Hue Ronquillo MD  10/30/23 3467

## 2023-10-30 NOTE — CONSULTS
Gastroenterology Consultation    Patient: Dotty Villanueva   1950  Date of Consult:  10/30/2023  Admission Date/Time: 10/30/2023  3:13 PM  Primary Care Provider:  Lilia Doyle    Requesting Physician: Dalia att. providers found    CHIEF COMPLAINT:   Nausea and vomiting, abdominal pain.    REASON FOR THE CONSULT: Nausea and vomiting    HPI:   The patient is a 73 year old White female with a history of type 2 diabetes mellitus, chronic kidney disease stage III, hypercholesterolemia, osteopenia, hypertension and prior history of bariatric surgery [Lap-Band surgery done in 2000].  She also has a prior history of cholecystectomy and hysterectomy.    She states that ever since her bariatric surgery, she has had intermittent issues with nausea and nonbloody emesis.  This used to bother her once in a while, but she was able to manage her symptoms.  Since April of this year, she has now had significantly worsened nausea and emesis.  She describes having persistent nausea and vomiting after pretty much every meal.  In addition, she describes abdominal pain in the epigastric region and left upper quadrant.  This led to her significant change in diet, as she noticed that her symptoms worsened with solid foods, and she completely switched over to more of liquid or soft foods.    Due to the change in diet, and persistent symptoms, she has had a decrease in appetite, and has lost about 20 pounds of weight in the last 2 months.    In addition, she describes persistent issues with heartburn and regurgitation, and with her change in diet, she states that her diabetes has gone out of control, and she has intermittent episodes of lightheadedness or dizziness.  She denies any chest pain, shortness of breath, dysphagia, odynophagia or GI bleeding.  No alteration of bowel habits.  She does have a persistent cough, but denies any hoarseness of voice or sore throat.  She feels tired.  No fever or chills.    Due to these issues, she saw  her primary provider, and then was subsequently set up for an upper endoscopy with us as an outpatient.  EGD done on September 1, 2023, revealed severe esophagitis, with ulceration in the distal esophagus.  Biopsies were just suggestive of erosive esophagitis, but there was no evidence for eosinophilic esophagitis or viral infection.  Procedure was incomplete, as the stomach was filled with food, and hence the procedure had to be aborted.  Gastric and duodenal examination were not performed at that time.  She was treated with PPI therapy, and recommended omeprazole 40 mg twice a day, and then set up for a follow-up upper endoscopy today.    She was set up for this EGD, on a liquid diet for the past more than 24 hours.  Despite this, the EGD again revealed significant amounts of solid and liquid food in the gastric lumen, precluding visualization.  Again, due to the risk of aspiration, this procedure was aborted.  She was again noted to have distal esophagitis with shallow ulcerations, but was felt slightly better than what it looked like before.    She was subsequently recommended further evaluation including imaging studies, and recommended to come into the ED for further management.    Currently, she feels okay, at her baseline.  She tolerated the EGD well, and has no evidence for aspiration.  She currently denies any abdominal pain.    She is a non-smoker, and rarely drinks any alcohol.  She is not on any NSAIDs or anticoagulant therapy.    REVIEW OF SYSTEMS:  Review of systems as per HPI, rest essentially negative.    PAST MEDICAL HISTORY:  No past medical history on file.  As per HPI.    PAST SURGICAL HISTORY:  No past surgical history on file.  As per HPI.    MEDICATIONS:  Reviewed    ALLERGIES:  Enalapril, Penicillins, and Morphine    FAMILY HISTORY:  No family history on file.  No family history of colorectal cancer, polyps, inflammatory bowel disease or celiac sprue.    SOCIAL HISTORY:  Social History  "    Tobacco Use    Smoking status: Not on file    Smokeless tobacco: Not on file   Substance Use Topics    Alcohol use: Not on file       PHYSICAL EXAM:  BP (!) 205/89   Pulse 71   Temp 98  F (36.7  C) (Oral)   Resp 16   Ht 1.6 m (5' 3\")   Wt 64 kg (141 lb)   SpO2 98%   BMI 24.98 kg/m    Body mass index is 24.98 kg/m .  Constitutional: healthy, alert, and no distress   Cardiovascular: S1, S2 normal.  Respiratory: Clear to auscultation bilaterally  ENT: Clear  Abdomen: Abdomen soft, nontender, nondistended, no rebound or guarding or rigidity.  No free fluid.  JOINT/EXTREMITIES: No edema    LABS:  CBC:  Recent Labs   Lab Test 10/30/23  1500   WBC 8.7   RBC 4.60   HGB 13.4   HCT 41.5   MCV 90   MCH 29.1   MCHC 32.3   RDW 12.8           BMP:  Recent Labs   Lab 10/30/23  1500      POTASSIUM 3.1*   CHLORIDE 104   CO2 32*   GLC 79   CR 0.88   BUN 5.9*       Liver/Pancreas:  Recent Labs   Lab 10/30/23  1500   AST 19   ALT 10   ALKPHOS 84   BILITOTAL 0.4   LIPASE 13     IMAGING:    No recent relevant imaging study.    ASSESSMENT:   Dotty Villanueva is a 73-year-old lady, with multiple medical issues, including diabetes mellitus, with a prior lap band surgery more than 20 years ago.    She presents with persistent abdominal pain in the epigastric region associated with persistent nausea and emesis leading to about a 20 pound weight loss over the past few months.    The differential diagnosis includes bariatric surgery related complication such as stenosis at the site of her Lap-Band, gastric outlet obstruction from malignancy or peptic ulcer disease, H. pylori infection, gastroparesis etc.    She has also been documented to have severe erosive esophagitis on endoscopy.    At this point, she will benefit from further evaluation including an imaging study of her abdomen, to rule out any malignancy as well as better assess her upper GI tract.  In addition, once her gastric lumen has emptied out more, then " setting her up for a nuclear medicine gastric emptying scan would be of benefit.  Finally, she would also benefit from an upper endoscopy hopefully during this admission, once her gastric lumen is emptied out.    Currently, she is not having any nausea or emesis, or abdominal pain; so we will hold off on NG tube placement.  That being said if the CT scan is suggestive of significant gastric outlet obstruction or gastric distention due to food and fluid, then she may benefit from NG tube placement, to decompress her stomach.    She does not have any evidence for GI bleeding, and her liver function tests are completely normal.  She has previously had a cholecystectomy.    I discussed the above plan with her, which she understands and is agreeable to proceed with.    PLAN:  1.  Keep n.p.o. today.  2.  CT scan of the abdomen pelvis today.  3.  Will benefit from nuclear medicine gastric emptying scan as well as repeat EGD, hopefully during this hospitalization.  4.  Please continue PPI such as omeprazole 40 mg or equivalent twice a day.  Since she is n.p.o. for now, could consider giving this as an IV every 12 hourly, and once she is starting back on a p.o. diet, then switching over to p.o. formulation.  5.  Avoid NSAIDs, anticoagulation    Thank you for this consultation, we will follow along with you.    Approximately 30 minutes of total time was spent providing patient care, including patient evaluation, reviewing documentation/test results, and .          Mika Leon MD  Thank you for the opportunity to participate in the care of this patient.   Please feel free to call with any questions or concerns.  [phone].       CC: [facility], Lilia Doyle

## 2023-10-30 NOTE — H&P
[facility] MEDICINE ADMISSION HISTORY AND PHYSICAL   Date of Admission: 10/30/2023  Dotty Villanueva, 1950, [id number]    Identification/Summary:   Dotty Villanueva is a 73 year old female with PMH signficant for laparoscopic gastric banding in 2000, esophagitis, DM 2, CKD 3, HTN, goiter, hyperlipidemia, uterine cancer and hysterectomy, nephrolithiasis.  Struggles with nausea and vomiting for multiple years.  April 2023 nausea vomiting worsened.  20 pound weight loss.  9/1/2023 EGD showed severe esophagitis with ulceration at the distal esophagus, incomplete procedure as stomach was filled with food.  Received PPI twice daily.  10/30 repeat EGD after a 5-day fast and showed improvement to the esophagitis but still had retained food.  Directed to the emergency room.  GI evaluated the patient and recommended admission with gastric emptying study on 10/31.  Admitted.    Assessment and Plan:    Incomplete gastric emptying  Recurrent nausea vomiting  History of laparoscopic gastric banding 2000  Unintentional weight loss  Observation [facility] admission.  Historical information as above.  Appreciate [facility] consultation.  Plan to keep patient n.p.o. and perform gastric emptying study on 10/31.  Hydrate with intravenous fluids.  Hold on her multiple multivitamins.  Hold her Carafate.  Further management per [facility].  Esophagitis  Order Protonix 40 mg IV every 12 hours.  Hypokalemia  Add potassium to IV fluids and utilize potassium replacement protocol with IV medication.  Essential hypertension  Order her evening p.o. Norvasc and benazepril with hold parameters.  Order hydralazine for severe elevations.  Type 2 diabetes  Check an A1c.  Follow blood sugars every 4 hours.  Utilize n.p.o. insulin sliding scale.  Hold her Amaryl.  Hyperlipidemia  Hold her Lipitor.  Insomnia  Order her bedtime as needed trazodone.    Anticoagulation   Hold her baby aspirin.  Pneumatic Compression  Devices    COVID19 PCR not tested    FoleyNot present    Code Status: Full Code    Disposition: Observation     Clinically Significant Risk Factors Present on Admission        # Hypokalemia: Lowest K = 3.1 mmol/L in last 2 days, will replace as needed    # Hypercalcemia: Highest Ca = 10.5 mg/dL in last 2 days, will monitor as appropriate      # Drug Induced Platelet Defect: home medication list includes an antiplatelet medication   # Hypertension: Noted on problem list                 Chief Complaint Worsening nausea and vomiting and retained food products     HISTORY     Dotty Villanueva is a 73 year old female with PMH of laparoscopic gastric banding in 2000, esophagitis, DM 2, CKD 3, HTN, goiter, hyperlipidemia, uterine cancer and hysterectomy, nephrolithiasis.  Struggles with nausea and vomiting for multiple years.  April 2023 nausea vomiting worsened.  20 pound weight loss.  9/1/2023 EGD showed severe esophagitis with ulceration at the distal esophagus, incomplete procedure as stomach was filled with food.  Received PPI twice daily.  10/30 repeat EGD after a 5-day fast and showed improvement to the esophagitis but still had retained food.  Directed to the emergency room.  GI evaluated the patient and recommended admission with gastric emptying study on 10/31.  Patient doing okay.  No shortness of breath.  Noticing some heartburn but omeprazole seems to take care of it.  No radiation up into the neck jaw or extremities.  Nausea and vomiting's been under control while she has not been eating.  Denies personal history of heart attack, stroke, DVT, PE or sleep apnea.   present and supportive..  .  Questions answered to verbalize satisfaction.    Past Medical History     Past Medical History:  No date: Esophagitis  No date: Essential hypertension  No date: Hyperlipidemia  07/18/2018: Malignant neoplasm of skin      Comment:  Formatting of this note might be different from the                original. 7/2018-  left chin, SCCIS- Narrow negative                margins. Observe  02/18/2016: Pulmonary nodules      Comment:  Formatting of this note might be different from the                original. Stable on CT for 24 months, per [facility], no               follow up needed 1/2016 04/26/2017: Renal calculi  10/30/2023: Stage 3 chronic kidney disease (H)      Comment:  Formatting of this note might be different from the                original. 3a  10/30/2023: Toxic multinodular goiter      Comment:  Formatting of this note might be different from the                original. partially supressed TSH -2003-0.08;0.18 6/07;                0.11 10/07 TSH supressed 12/07 bilateral > 2cm dominant                nodules-12/07 on us - negative fna bilateral 1/08 13%                uptake with cold nodule right 12/07 51 mCi -1/31/08  No date: Type 2 diabetes mellitus (H)     Patient Active Problem List    Diagnosis Date Noted    Osteopenia 10/30/2023     Priority: Medium     Formatting of this note might be different from the original. 2/2016 repeat bone density testing in 3 years - ordered 1/9/2019- done  , offered again 4/2022- declined      Stage 3 chronic kidney disease (H) 10/30/2023     Priority: Medium     Formatting of this note might be different from the original. 3a      Toxic multinodular goiter 10/30/2023     Priority: Medium     Formatting of this note might be different from the original. partially supressed TSH -2003-0.08;0.18 6/07; 0.11 10/07 TSH supressed 12/07 bilateral > 2cm dominant nodules-12/07 on us - negative fna bilateral 1/08 13% uptake with cold nodule right 12/07 51 mCi -1/31/08      Early satiety 10/30/2023     Priority: Medium    Nausea and vomiting 10/30/2023     Priority: Medium    Esophagitis 10/30/2023     Priority: Medium    Type 2 diabetes mellitus (H) 10/30/2023     Priority: Medium    GERD (gastroesophageal reflux disease) 10/30/2023     Priority: Medium    Abnormal weight loss 08/04/2023      Priority: Medium    Malignant neoplasm of skin 07/18/2018     Priority: Medium     Formatting of this note might be different from the original. 7/2018- left chin, SCCIS- Narrow negative margins. Observe      Hydronephrosis of left kidney 04/26/2017     Priority: Medium    Renal calculi 04/26/2017     Priority: Medium    Pulmonary nodules 02/18/2016     Priority: Medium     Formatting of this note might be different from the original. Stable on CT for 24 months, per [facility], no follow up needed 1/2016      Vitamin D deficiency 01/29/2015     Priority: Medium    Essential hypertension 05/28/2014     Priority: Medium    Type 2 diabetes mellitus with microalbuminuria, without long-term current use of insulin (H) 12/15/2013     Priority: Medium    Pure hypercholesterolemia 07/12/2006     Priority: Medium     Surgical History     Past Surgical History:   Procedure Laterality Date    APPENDECTOMY      CHOLECYSTECTOMY      HAND CONTRACTURE RELEASE Left     HYSTERECTOMY      Complicated by bowel laceration.  Required colonic repair.    LAPAROSCOPIC GASTRIC BANDING  2000    LEEP TX, CERVICAL      THYROID BIOPSY      TUBAL LIGATION       Family History      Family History   Problem Relation Age of Onset    Diabetes Mother     Heart Disease Father     Heart Disease Brother         x4      Social History      Social History     Tobacco Use    Smoking status: Never   Substance Use Topics    Alcohol use: Yes     Comment: Rare      Allergies     Allergies   Allergen Reactions    Enalapril Cough    Morphine Rash     And itching    Penicillins Nausea and Vomiting and Rash     Prior to Admission Medications      Prior to Admission Medications   Prescriptions Last Dose Informant Patient Reported? Taking?   amLODIPine (NORVASC) 5 MG tablet 10/29/2023  Yes Yes   Sig: Take 5 mg by mouth daily (before supper)   aspirin (ASA) 81 MG chewable tablet 10/30/2023  Yes Yes   Sig: Take 81 mg by mouth daily   atorvastatin (LIPITOR) 20 MG  tablet 10/29/2023  Yes Yes   Sig: Take 60 mg by mouth daily (before supper)   benazepril (LOTENSIN) 40 MG tablet 10/29/2023  Yes Yes   Sig: Take 40 mg by mouth daily (before supper)   calcium-magnesium (CALMAG) 500-250 MG TABS per tablet 10/29/2023  Yes Yes   Sig: Take 1 tablet by mouth daily (before supper)   childrens multivitamin (ANIMAL SHAPES) CHEW chewable tablet 10/29/2023  Yes Yes   Sig: Take 1 tablet by mouth daily (before supper)   cholecalciferol (VITAMIN D3) 125 mcg (5000 units) capsule 10/29/2023  Yes Yes   Sig: Take 250 mcg by mouth daily (before supper)   co-enzyme Q-10 100 MG CAPS capsule 10/29/2023  Yes Yes   Sig: Take 100 mg by mouth daily (before supper)   cyanocobalamin (CYANOCOBALAMIN) 1000 MCG/ML injection 10/28/2023  Yes Yes   Sig: Inject 1 mL into the muscle every 7 days   glimepiride (AMARYL) 2 MG tablet 10/27/2023  Yes Yes   Sig: Take 2 mg by mouth daily (with dinner)   omeprazole (PRILOSEC) 40 MG DR capsule 10/30/2023 at x1  Yes Yes   Sig: Take 40 mg by mouth 2 times daily (before meals)   sucralfate (CARAFATE) 1 GM/10ML suspension 10/27/2023  Yes Yes   Sig: Take 1 g by mouth 3 times daily (before meals)   traZODone (DESYREL) 100 MG tablet prn  Yes Yes   Sig: Take 100 mg by mouth nightly as needed for sleep   zinc gluconate 50 MG tablet 10/29/2023  Yes Yes   Sig: Take 50 mg by mouth daily (before supper)      Facility-Administered Medications: None      Review of Systems     A 12 point comprehensive review of systems was negative except as noted above in HPI.    PHYSICAL EXAMINATION     Vitals      Temp:  [98  F (36.7  C)] 98  F (36.7  C)  Pulse:  [69-85] 69  Resp:  [16] 16  BP: (186-205)/(88-96) 186/88  SpO2:  [98 %-99 %] 99 %    Examination     Constitutional: awake, alert, cooperative, no apparent distress, and appears stated age  Eyes: Lids and lashes normal, pupils equal, round and reactive to light, extra ocular muscles intact, sclera clear, conjunctiva normal  ENT: Normocephalic,  without obvious abnormality, atraumatic, sinuses nontender on palpation, external ears without lesions, oral pharynx with moist mucous membranes, tonsils without erythema or exudates, gums normal and good dentition.  Hematologic / Lymphatic: no cervical lymphadenopathy and no supraclavicular lymphadenopathy  Respiratory: No increased work of breathing, good air exchange, clear to auscultation bilaterally, no crackles or wheezing  Cardiovascular: Normal apical impulse, regular rate and rhythm, normal S1 and S2, no S3 or S4, and no murmur noted  GI: No scars, normal bowel sounds, soft, non-distended, non-tender, no masses palpated, no hepatosplenomegally  Skin: normal skin color, texture, turgor, no redness, warmth, or swelling, and no rashes  Musculoskeletal: There is no redness, warmth, or swelling of the joints.  Full range of motion noted.  Motor strength is 5 out of 5 all extremities bilaterally.  Tone is normal. no lower extremity pitting edema present  Neurologic: Cranial nerves II-XII are grossly intact. Sensory:  Sensory intact Deep Tendon Reflexes:  Reflexes are intact and symmetrical bilaterally  Neuropsychiatric: General: normal, calm, and normal eye contact Level of consciousness: alert / normal Affect: normal Orientation: oriented to self, place, time and situation Memory and insight: normal, memory for past and recent events intact, and thought process normal      Pertinent Lab     Results for orders placed or performed during the hospital encounter of 10/30/23 (from the past 24 hour(s))   CBC with platelets + differential    Narrative    The following orders were created for panel order CBC with platelets + differential.  Procedure                               Abnormality         Status                     ---------                               -----------         ------                     CBC with platelets and d...[[id number]]                      Final result                 Please view results  for these tests on the individual orders.   Basic metabolic panel   Result Value Ref Range    Sodium 143 135 - 145 mmol/L    Potassium 3.1 (L) 3.4 - 5.3 mmol/L    Chloride 104 98 - 107 mmol/L    Carbon Dioxide (CO2) 32 (H) 22 - 29 mmol/L    Anion Gap 7 7 - 15 mmol/L    Urea Nitrogen 5.9 (L) 8.0 - 23.0 mg/dL    Creatinine 0.88 0.51 - 0.95 mg/dL    GFR Estimate 69 >60 mL/min/1.73m2    Calcium 10.5 (H) 8.8 - 10.2 mg/dL    Glucose 79 70 - 99 mg/dL   Hepatic function panel   Result Value Ref Range    Protein Total 6.1 (L) 6.4 - 8.3 g/dL    Albumin 3.5 3.5 - 5.2 g/dL    Bilirubin Total 0.4 <=1.2 mg/dL    Alkaline Phosphatase 84 35 - 104 U/L    AST 19 0 - 45 U/L    ALT 10 0 - 50 U/L    Bilirubin Direct <0.20 0.00 - 0.30 mg/dL   Lipase   Result Value Ref Range    Lipase 13 13 - 60 U/L   CBC with platelets and differential   Result Value Ref Range    WBC Count 8.7 4.0 - 11.0 10e3/uL    RBC Count 4.60 3.80 - 5.20 10e6/uL    Hemoglobin 13.4 11.7 - 15.7 g/dL    Hematocrit 41.5 35.0 - 47.0 %    MCV 90 78 - 100 fL    MCH 29.1 26.5 - 33.0 pg    MCHC 32.3 31.5 - 36.5 g/dL    RDW 12.8 10.0 - 15.0 %    Platelet Count 418 150 - 450 10e3/uL    % Neutrophils 75 %    % Lymphocytes 18 %    % Monocytes 5 %    % Eosinophils 1 %    % Basophils 1 %    % Immature Granulocytes 0 %    NRBCs per 100 WBC 0 <1 /100    Absolute Neutrophils 6.5 1.6 - 8.3 10e3/uL    Absolute Lymphocytes 1.6 0.8 - 5.3 10e3/uL    Absolute Monocytes 0.5 0.0 - 1.3 10e3/uL    Absolute Eosinophils 0.1 0.0 - 0.7 10e3/uL    Absolute Basophils 0.1 0.0 - 0.2 10e3/uL    Absolute Immature Granulocytes 0.0 <=0.4 10e3/uL    Absolute NRBCs 0.0 10e3/uL   CT Abdomen Pelvis w Contrast    Narrative    EXAM: CT ABDOMEN PELVIS W CONTRAST  LOCATION: [facility]  DATE: 10/30/2023    INDICATION: early satiety, abd pain  COMPARISON: 10/26/2015 CT  TECHNIQUE: CT scan of the abdomen and pelvis was performed following injection of IV contrast. Multiplanar reformats were  obtained. Dose reduction techniques were used.  CONTRAST: 100ml Isovue 370    FINDINGS:   LOWER CHEST: Slight fibrosis left lower lobe of no significance.    HEPATOBILIARY: Gallbladder removed. Mildly prominent extrahepatic bile ducts likely reservoir effect. Mild diffuse hepatic steatosis.    PANCREAS: Normal.    SPLEEN: Normal.    ADRENAL GLANDS: Normal.    KIDNEYS/BLADDER: There is a 1.8 x 1.2 cm mildly obstructing stone in the renal pelvis with some generalized thickening of the renal pelvis likely from chronic irritation. No other stones.    BOWEL: Scattered diverticulosis in the colon.    LYMPH NODES: No lymphadenopathy.    VASCULATURE: Moderate calcified plaque. No aneurysms.    PELVIC ORGANS: Postop change.    MUSCULOSKELETAL: Normal.      Impression    IMPRESSION:   1.  There is a mildly obstructing stone left renal pelvis measures 1.8 cm with some thickening of the renal pelvis consistent with chronic irritation.    2.  No other significant findings.    3.  Postop change in the stomach.       Pertinent Radiology     Radiology Results:   Recent Results (from the past 24 hour(s))   CT Abdomen Pelvis w Contrast    Narrative    EXAM: CT ABDOMEN PELVIS W CONTRAST  LOCATION: [facility]  DATE: 10/30/2023    INDICATION: early satiety, abd pain  COMPARISON: 10/26/2015 CT  TECHNIQUE: CT scan of the abdomen and pelvis was performed following injection of IV contrast. Multiplanar reformats were obtained. Dose reduction techniques were used.  CONTRAST: 100ml Isovue 370    FINDINGS:   LOWER CHEST: Slight fibrosis left lower lobe of no significance.    HEPATOBILIARY: Gallbladder removed. Mildly prominent extrahepatic bile ducts likely reservoir effect. Mild diffuse hepatic steatosis.    PANCREAS: Normal.    SPLEEN: Normal.    ADRENAL GLANDS: Normal.    KIDNEYS/BLADDER: There is a 1.8 x 1.2 cm mildly obstructing stone in the renal pelvis with some generalized thickening of the renal pelvis likely  from chronic irritation. No other stones.    BOWEL: Scattered diverticulosis in the colon.    LYMPH NODES: No lymphadenopathy.    VASCULATURE: Moderate calcified plaque. No aneurysms.    PELVIC ORGANS: Postop change.    MUSCULOSKELETAL: Normal.      Impression    IMPRESSION:   1.  There is a mildly obstructing stone left renal pelvis measures 1.8 cm with some thickening of the renal pelvis consistent with chronic irritation.    2.  No other significant findings.    3.  Postop change in the stomach.               Yakov Hidalgo MD  [facility]   Phone: #[phone]

## 2023-10-30 NOTE — PHARMACY-ADMISSION MEDICATION HISTORY
Pharmacist Admission Medication History    Admission medication history is complete. The information provided in this note is only as accurate as the sources available at the time of the update.    Information Source(s): Patient via in-person    Pertinent Information: gastric bypass pt    Changes made to PTA medication list:  Added: all meds  Deleted: None  Changed: None    Medication Affordability:       Allergies reviewed with patient and updates made in EHR: yes    Medication History Completed By: Lu Rossi [facility] 10/30/2023 4:52 PM    PTA Med List   Medication Sig Last Dose    amLODIPine (NORVASC) 5 MG tablet Take 5 mg by mouth daily (before supper) 10/29/2023    aspirin (ASA) 81 MG chewable tablet Take 81 mg by mouth daily 10/30/2023    atorvastatin (LIPITOR) 20 MG tablet Take 60 mg by mouth daily (before supper) 10/29/2023    benazepril (LOTENSIN) 40 MG tablet Take 40 mg by mouth daily (before supper) 10/29/2023    calcium-magnesium (CALMAG) 500-250 MG TABS per tablet Take 1 tablet by mouth daily (before supper) 10/29/2023    childrens multivitamin (ANIMAL SHAPES) CHEW chewable tablet Take 1 tablet by mouth daily (before supper) 10/29/2023    cholecalciferol (VITAMIN D3) 125 mcg (5000 units) capsule Take 250 mcg by mouth daily (before supper) 10/29/2023    co-enzyme Q-10 100 MG CAPS capsule Take 100 mg by mouth daily (before supper) 10/29/2023    cyanocobalamin (CYANOCOBALAMIN) 1000 MCG/ML injection Inject 1 mL into the muscle every 7 days 10/28/2023    glimepiride (AMARYL) 2 MG tablet Take 2 mg by mouth daily (with dinner) 10/27/2023    omeprazole (PRILOSEC) 40 MG DR capsule Take 40 mg by mouth 2 times daily (before meals) 10/30/2023 at x1    sucralfate (CARAFATE) 1 GM/10ML suspension Take 1 g by mouth 3 times daily (before meals) 10/27/2023    traZODone (DESYREL) 100 MG tablet Take 100 mg by mouth nightly as needed for sleep prn    zinc gluconate 50 MG tablet Take 50 mg by mouth daily (before supper)  10/29/2023

## 2023-10-30 NOTE — ED NOTES
Expected Patient Referral to ED  12:44 PM    Referring Clinic/Provider:  [facility]--Dr. Miller    Reason for referral/Clinical facts:  Seen two months ago in April for N/V/abdominal pain and 25 lb weight loss  Upper endoscopy 9/1/23 severe esophagitis with ulceration with negative biopsies, ulcerative esophagitis, procedure was not finished as patient with stomach with lots of food. Treated for PPI  Re-scheduled EGD today, distal esophagus still with shallow ulceration, no solids in the last 1 - 2 days with constant large amount of food, suspicion for gastric outlet obstruction  Scheduled CT scan abdomen/pelvis and gastric emptying study    PMH: DM, cholecystectomy, hysterectomy, bariatric surgery    Dr. Leon on call for [facility], Dr. Miller will call and make him aware    Recommendations provided:  Send to ED for further evaluation  Plan: Will likely require admission for gastric outlet obstruction and recommend work-up including CT scan abdomen/pelvis and gastric emptying study    Caller was informed that this institution does possess the capabilities and/or resources to provide for patient and should be transferred to our facility.    Discussed that if direct admit is sought and any hurdles are encountered, this ED would be happy to see the patient and evaluate.    Informed caller that recommendations provided are recommendations based only on the facts provided and that they responsible to accept or reject the advice, or to seek a formal in person consultation as needed and that this ED will see/treat patient should they arrive.      Orquidea Jean-Baptiste MD  [facility] EMERGENCY ROOM  [address] [id number] [phone]       Orquidea Jean-Baptiste MD  10/30/23 1249

## 2023-10-30 NOTE — ED TRIAGE NOTES
Patient presents to ED after having an endoscopy today that could not be completed as patient still had food in her stomach, even though the last time that she had solid food was on Thursday, this also occurred in September.  Pt sent here for workup, since April pt has had intermittent abdominal pain.  Lilo Pugh RN.......10/30/2023 1:16 PM     Triage Assessment (Adult)       Row Name 10/30/23 1315          Triage Assessment    Airway WDL WDL        Respiratory WDL    Respiratory WDL WDL        Skin Circulation/Temperature WDL    Skin Circulation/Temperature WDL WDL        Cardiac WDL    Cardiac WDL WDL        Peripheral/Neurovascular WDL    Peripheral Neurovascular WDL WDL        Cognitive/Neuro/Behavioral WDL    Cognitive/Neuro/Behavioral WDL WDL

## 2023-10-30 NOTE — H&P (VIEW-ONLY)
Gastroenterology Consultation    Patient: Dotty Villanueva   1950  Date of Consult:  10/30/2023  Admission Date/Time: 10/30/2023  3:13 PM  Primary Care Provider:  Lilia Doyle    Requesting Physician: Dalia att. providers found    CHIEF COMPLAINT:   Nausea and vomiting, abdominal pain.    REASON FOR THE CONSULT: Nausea and vomiting    HPI:   The patient is a 73 year old White female with a history of type 2 diabetes mellitus, chronic kidney disease stage III, hypercholesterolemia, osteopenia, hypertension and prior history of bariatric surgery [Lap-Band surgery done in 2000].  She also has a prior history of cholecystectomy and hysterectomy.    She states that ever since her bariatric surgery, she has had intermittent issues with nausea and nonbloody emesis.  This used to bother her once in a while, but she was able to manage her symptoms.  Since April of this year, she has now had significantly worsened nausea and emesis.  She describes having persistent nausea and vomiting after pretty much every meal.  In addition, she describes abdominal pain in the epigastric region and left upper quadrant.  This led to her significant change in diet, as she noticed that her symptoms worsened with solid foods, and she completely switched over to more of liquid or soft foods.    Due to the change in diet, and persistent symptoms, she has had a decrease in appetite, and has lost about 20 pounds of weight in the last 2 months.    In addition, she describes persistent issues with heartburn and regurgitation, and with her change in diet, she states that her diabetes has gone out of control, and she has intermittent episodes of lightheadedness or dizziness.  She denies any chest pain, shortness of breath, dysphagia, odynophagia or GI bleeding.  No alteration of bowel habits.  She does have a persistent cough, but denies any hoarseness of voice or sore throat.  She feels tired.  No fever or chills.    Due to these issues, she saw  her primary provider, and then was subsequently set up for an upper endoscopy with us as an outpatient.  EGD done on September 1, 2023, revealed severe esophagitis, with ulceration in the distal esophagus.  Biopsies were just suggestive of erosive esophagitis, but there was no evidence for eosinophilic esophagitis or viral infection.  Procedure was incomplete, as the stomach was filled with food, and hence the procedure had to be aborted.  Gastric and duodenal examination were not performed at that time.  She was treated with PPI therapy, and recommended omeprazole 40 mg twice a day, and then set up for a follow-up upper endoscopy today.    She was set up for this EGD, on a liquid diet for the past more than 24 hours.  Despite this, the EGD again revealed significant amounts of solid and liquid food in the gastric lumen, precluding visualization.  Again, due to the risk of aspiration, this procedure was aborted.  She was again noted to have distal esophagitis with shallow ulcerations, but was felt slightly better than what it looked like before.    She was subsequently recommended further evaluation including imaging studies, and recommended to come into the ED for further management.    Currently, she feels okay, at her baseline.  She tolerated the EGD well, and has no evidence for aspiration.  She currently denies any abdominal pain.    She is a non-smoker, and rarely drinks any alcohol.  She is not on any NSAIDs or anticoagulant therapy.    REVIEW OF SYSTEMS:  Review of systems as per HPI, rest essentially negative.    PAST MEDICAL HISTORY:  No past medical history on file.  As per HPI.    PAST SURGICAL HISTORY:  No past surgical history on file.  As per HPI.    MEDICATIONS:  Reviewed    ALLERGIES:  Enalapril, Penicillins, and Morphine    FAMILY HISTORY:  No family history on file.  No family history of colorectal cancer, polyps, inflammatory bowel disease or celiac sprue.    SOCIAL HISTORY:  Social History  "    Tobacco Use    Smoking status: Not on file    Smokeless tobacco: Not on file   Substance Use Topics    Alcohol use: Not on file       PHYSICAL EXAM:  BP (!) 205/89   Pulse 71   Temp 98  F (36.7  C) (Oral)   Resp 16   Ht 1.6 m (5' 3\")   Wt 64 kg (141 lb)   SpO2 98%   BMI 24.98 kg/m    Body mass index is 24.98 kg/m .  Constitutional: healthy, alert, and no distress   Cardiovascular: S1, S2 normal.  Respiratory: Clear to auscultation bilaterally  ENT: Clear  Abdomen: Abdomen soft, nontender, nondistended, no rebound or guarding or rigidity.  No free fluid.  JOINT/EXTREMITIES: No edema    LABS:  CBC:  Recent Labs   Lab Test 10/30/23  1500   WBC 8.7   RBC 4.60   HGB 13.4   HCT 41.5   MCV 90   MCH 29.1   MCHC 32.3   RDW 12.8           BMP:  Recent Labs   Lab 10/30/23  1500      POTASSIUM 3.1*   CHLORIDE 104   CO2 32*   GLC 79   CR 0.88   BUN 5.9*       Liver/Pancreas:  Recent Labs   Lab 10/30/23  1500   AST 19   ALT 10   ALKPHOS 84   BILITOTAL 0.4   LIPASE 13     IMAGING:    No recent relevant imaging study.    ASSESSMENT:   Dotty Villanueva is a 73-year-old lady, with multiple medical issues, including diabetes mellitus, with a prior lap band surgery more than 20 years ago.    She presents with persistent abdominal pain in the epigastric region associated with persistent nausea and emesis leading to about a 20 pound weight loss over the past few months.    The differential diagnosis includes bariatric surgery related complication such as stenosis at the site of her Lap-Band, gastric outlet obstruction from malignancy or peptic ulcer disease, H. pylori infection, gastroparesis etc.    She has also been documented to have severe erosive esophagitis on endoscopy.    At this point, she will benefit from further evaluation including an imaging study of her abdomen, to rule out any malignancy as well as better assess her upper GI tract.  In addition, once her gastric lumen has emptied out more, then " setting her up for a nuclear medicine gastric emptying scan would be of benefit.  Finally, she would also benefit from an upper endoscopy hopefully during this admission, once her gastric lumen is emptied out.    Currently, she is not having any nausea or emesis, or abdominal pain; so we will hold off on NG tube placement.  That being said if the CT scan is suggestive of significant gastric outlet obstruction or gastric distention due to food and fluid, then she may benefit from NG tube placement, to decompress her stomach.    She does not have any evidence for GI bleeding, and her liver function tests are completely normal.  She has previously had a cholecystectomy.    I discussed the above plan with her, which she understands and is agreeable to proceed with.    PLAN:  1.  Keep n.p.o. today.  2.  CT scan of the abdomen pelvis today.  3.  Will benefit from nuclear medicine gastric emptying scan as well as repeat EGD, hopefully during this hospitalization.  4.  Please continue PPI such as omeprazole 40 mg or equivalent twice a day.  Since she is n.p.o. for now, could consider giving this as an IV every 12 hourly, and once she is starting back on a p.o. diet, then switching over to p.o. formulation.  5.  Avoid NSAIDs, anticoagulation    Thank you for this consultation, we will follow along with you.    Approximately 30 minutes of total time was spent providing patient care, including patient evaluation, reviewing documentation/test results, and .          Mika Leon MD  Thank you for the opportunity to participate in the care of this patient.   Please feel free to call with any questions or concerns.  [phone].       CC: MNVER[facility], Lilia Doyle

## 2023-10-30 NOTE — ED PROVIDER NOTES
EMERGENCY DEPARTMENT ENCOUNTER      NAME: Dotty Villanueva  AGE: 73 year old female  YOB: 1950  MRN: [id number]  EVALUATION DATE & TIME: No admission date for patient encounter.    PCP: Lilia Doyle    ED PROVIDER: Karen Blanc PA-C      Chief Complaint   Patient presents with    Post Precedure Problem       FINAL IMPRESSION:  1. Early satiety    2. Nausea and vomiting        MEDICAL DECISION MAKING:    Pertinent Labs & Imaging studies reviewed. (See chart for details)  73 year old female with a h/o hypertension, osteopenia, CKD stage III, DM 2 presents to the Emergency Department for evaluation of abdominal pain, early satiety.  Patient was undergoing an EGD today for early satiety/ulcerative esophagitis, this is the second attempt via [facility].  She was sent via [facility] for [facility] and recommended to have CT and and gastric emptying study.  On exam she is alert, nontoxic-appearing and in no acute distress.  Vital signs are notable for elevated blood pressure of 205/89.  Remainder WNL.  Abdomen is soft throughout. Normal bowel sounds x4.     Differential diagnosis includes gastric outlet obstruction, PUD, GERD, bowel obstruction, malignancy.  CBC is WNL.  BMP with slightly decreased potassium at 3.1, this was IV replaced here in the ED. Ct abd shows only mildly obstructing stone left renal pelvis without other intraabdominal abnormality. Dr. Leon aware and following. Will plan for NM gastric emptying study tomorrow. Pt is NPO.    Discussed with hospitalist who agrees with admission to observation. GI following. Please see inpatient notes for further findings.    Medical Decision Making    History:  Supplemental history from: Documented in chart, if applicable  External Record(s) reviewed: Documented in chart, if applicable. and Outpatient Record: [facility] 9/2023    Work Up:  Chart documentation includes differential considered and any EKGs or imaging independently interpreted by  provider, where specified.  In additional to work up documented, I considered the following work up: Documented in chart, if applicable.    External consultation:  Discussion of management with another provider: Documented in chart, if applicable and Gastroenterology    Complicating factors:  Care impacted by chronic illness: Chronic Kidney Disease, Diabetes, and Hypertension  Care affected by social determinants of health: N/A    Disposition considerations: Admit.        CRITICAL CARE: None    ED COURSE  2:30 PM  Met and evaluated patient. Discussed ED plan.   4:15 PM Staffed the patient with Dr. Tara Ronquillo  4: 30 PM discussed with hospitalist who agrees with admission, they will be admitted to med surg observation     MEDICATIONS GIVEN IN THE EMERGENCY:  Medications   potassium chloride 10 mEq in 100 mL sterile water infusion (has no administration in time range)   iopamidol (ISOVUE-370) solution 100 mL (100 mLs Intravenous $Given 10/30/23 3135)       NEW PRESCRIPTIONS STARTED AT TODAY'S ER VISIT  New Prescriptions    No medications on file          =================================================================    HPI    Patient information was obtained from: Patient     Use of Intrepreter: N/A     Dotty Villanueva is a 73 year old female who presents from [facility] for concern of gastric outlet obstruction.  Patient was scheduled to have a repeat EGD for [facility], she has been on liquids over the last 4 days and despite this gastric contents remained preventing scope visualization.  She is recommended to present to the ER for consideration of CT abdomen and gastric emptying study. Dr. Leon on call for [facility], Dr. Miller will call and make him aware.  She notes that over the last several months she has been unable to tolerate solid oral intake, her intake is limited to thin soup.  If she eats meat or heavy liquids she feels quite nauseous and bloated.  This often leads to her vomiting up to 15 times daily.  She has  significant nausea associated with this.  She was previously placed on omeprazole for esophageal gastritis, she does feel that this is helped minimally.  She notes history of open hysterectomy and some kind of bowel injury that she wonders if this is contributing to in a pouch formation in her intestines or stomach.  Notes that when she massages the stomach sometimes she can get contents to move through.  She does have an active stomach with frequent noises.  States that her stool has been normal without diarrhea.  She has not noticed any blood in the stool.  She has not had any fevers.  No other symptoms of illness.  All      REVIEW OF SYSTEMS   As noted in HPI. All other systems negative.    PAST MEDICAL HISTORY:  History reviewed. No pertinent past medical history.    PAST SURGICAL HISTORY:  History reviewed. No pertinent surgical history.        CURRENT MEDICATIONS:    No current outpatient medications on file.      ALLERGIES:  Allergies   Allergen Reactions    Enalapril Cough    Penicillins Nausea and Vomiting    Morphine Rash     And itching       FAMILY HISTORY:  History reviewed. No pertinent family history.    SOCIAL HISTORY:   Social History     Socioeconomic History    Marital status: Single     Spouse name: Not on file    Number of children: Not on file    Years of education: Not on file    Highest education level: Not on file   Occupational History    Not on file   Tobacco Use    Smoking status: Not on file    Smokeless tobacco: Not on file   Substance and Sexual Activity    Alcohol use: Not on file    Drug use: Not on file    Sexual activity: Not on file   Other Topics Concern    Not on file   Social History Narrative    Not on file     Social Determinants of Health     Financial Resource Strain: Not on file   Food Insecurity: Not on file   Transportation Needs: Not on file   Physical Activity: Not on file   Stress: Not on file   Social Connections: Not on file   Interpersonal Safety: Not on file  "  Housing Stability: Not on file         VITALS:  Patient Vitals for the past 24 hrs:   BP Temp Temp src Pulse Resp SpO2 Height Weight   10/30/23 1515 (!) 205/89 -- -- 71 -- 98 % -- --   10/30/23 1314 (!) 189/96 98  F (36.7  C) Oral 85 16 99 % 1.6 m (5' 3\") 64 kg (141 lb)       PHYSICAL EXAM    Physical Exam  Vitals reviewed.   Constitutional:       General: She is not in acute distress.     Appearance: Normal appearance. She is not ill-appearing, toxic-appearing or diaphoretic.   HENT:      Head: Normocephalic and atraumatic.      Mouth/Throat:      Mouth: Mucous membranes are moist.   Cardiovascular:      Rate and Rhythm: Normal rate.      Heart sounds: Normal heart sounds.   Pulmonary:      Effort: Pulmonary effort is normal. No respiratory distress.      Breath sounds: Normal breath sounds. No stridor. No wheezing, rhonchi or rales.   Abdominal:      General: Abdomen is flat. Bowel sounds are normal. There is no distension.      Tenderness: There is no abdominal tenderness. There is no guarding or rebound.   Musculoskeletal:         General: Normal range of motion.      Right lower leg: No edema.      Left lower leg: No edema.   Skin:     General: Skin is warm.      Capillary Refill: Capillary refill takes less than 2 seconds.      Findings: No lesion or rash.   Neurological:      Mental Status: She is alert.            LAB:  All pertinent labs reviewed and interpreted.    Labs Ordered and Resulted from Time of ED Arrival to Time of ED Departure   BASIC METABOLIC PANEL - Abnormal       Result Value    Sodium 143      Potassium 3.1 (*)     Chloride 104      Carbon Dioxide (CO2) 32 (*)     Anion Gap 7      Urea Nitrogen 5.9 (*)     Creatinine 0.88      GFR Estimate 69      Calcium 10.5 (*)     Glucose 79     HEPATIC FUNCTION PANEL - Abnormal    Protein Total 6.1 (*)     Albumin 3.5      Bilirubin Total 0.4      Alkaline Phosphatase 84      AST 19      ALT 10      Bilirubin Direct <0.20     LIPASE - Normal    " Lipase 13     CBC WITH PLATELETS AND DIFFERENTIAL    WBC Count 8.7      RBC Count 4.60      Hemoglobin 13.4      Hematocrit 41.5      MCV 90      MCH 29.1      MCHC 32.3      RDW 12.8      Platelet Count 418      % Neutrophils 75      % Lymphocytes 18      % Monocytes 5      % Eosinophils 1      % Basophils 1      % Immature Granulocytes 0      NRBCs per 100 WBC 0      Absolute Neutrophils 6.5      Absolute Lymphocytes 1.6      Absolute Monocytes 0.5      Absolute Eosinophils 0.1      Absolute Basophils 0.1      Absolute Immature Granulocytes 0.0      Absolute NRBCs 0.0           RADIOLOGY:  Reviewed all pertinent imaging. Please see official radiology report    CT Abdomen Pelvis w Contrast   Final Result   IMPRESSION:    1.  There is a mildly obstructing stone left renal pelvis measures 1.8 cm with some thickening of the renal pelvis consistent with chronic irritation.      2.  No other significant findings.      3.  Postop change in the stomach.      NM Gastric Emptying    (Results Pending)       Karen Blanc PA-C  Emergency Medicine  [facility] EMERGENCY ROOM  Counts include 234 beds at the [facility] [address] [phone]  Dept: [phone]    This note has in part been created with speech recognition technology and may create an occasional, unintended word/grammar substitution. Errors are generally corrected in real time. Please message me via [employer] if you note any errors requiring clarification.       Karen Blanc PA-C  10/30/23 0162

## 2023-10-31 ENCOUNTER — APPOINTMENT (OUTPATIENT)
Dept: NUCLEAR MEDICINE | Facility: CLINIC | Age: 73
DRG: 074 | End: 2023-10-31
Attending: PHYSICIAN ASSISTANT
Payer: MEDICARE

## 2023-10-31 LAB
ANION GAP SERPL CALCULATED.3IONS-SCNC: 6 MMOL/L (ref 7–15)
BUN SERPL-MCNC: 5.5 MG/DL (ref 8–23)
CALCIUM SERPL-MCNC: 9.2 MG/DL (ref 8.8–10.2)
CHLORIDE SERPL-SCNC: 108 MMOL/L (ref 98–107)
CREAT SERPL-MCNC: 0.86 MG/DL (ref 0.51–0.95)
DEPRECATED HCO3 PLAS-SCNC: 28 MMOL/L (ref 22–29)
EGFRCR SERPLBLD CKD-EPI 2021: 71 ML/MIN/1.73M2
GLUCOSE BLDC GLUCOMTR-MCNC: 103 MG/DL (ref 70–99)
GLUCOSE BLDC GLUCOMTR-MCNC: 119 MG/DL (ref 70–99)
GLUCOSE BLDC GLUCOMTR-MCNC: 142 MG/DL (ref 70–99)
GLUCOSE BLDC GLUCOMTR-MCNC: 156 MG/DL (ref 70–99)
GLUCOSE BLDC GLUCOMTR-MCNC: 164 MG/DL (ref 70–99)
GLUCOSE BLDC GLUCOMTR-MCNC: 165 MG/DL (ref 70–99)
GLUCOSE BLDC GLUCOMTR-MCNC: 166 MG/DL (ref 70–99)
GLUCOSE BLDC GLUCOMTR-MCNC: 194 MG/DL (ref 70–99)
GLUCOSE BLDC GLUCOMTR-MCNC: 61 MG/DL (ref 70–99)
GLUCOSE BLDC GLUCOMTR-MCNC: 66 MG/DL (ref 70–99)
GLUCOSE BLDC GLUCOMTR-MCNC: 73 MG/DL (ref 70–99)
GLUCOSE SERPL-MCNC: 66 MG/DL (ref 70–99)
HOLD SPECIMEN: NORMAL
POTASSIUM SERPL-SCNC: 3.5 MMOL/L (ref 3.4–5.3)
SODIUM SERPL-SCNC: 142 MMOL/L (ref 135–145)

## 2023-10-31 PROCEDURE — 96361 HYDRATE IV INFUSION ADD-ON: CPT

## 2023-10-31 PROCEDURE — 343N000001 HC RX 343: Performed by: PHYSICIAN ASSISTANT

## 2023-10-31 PROCEDURE — 80048 BASIC METABOLIC PNL TOTAL CA: CPT | Performed by: FAMILY MEDICINE

## 2023-10-31 PROCEDURE — 36415 COLL VENOUS BLD VENIPUNCTURE: CPT | Performed by: FAMILY MEDICINE

## 2023-10-31 PROCEDURE — 96376 TX/PRO/DX INJ SAME DRUG ADON: CPT

## 2023-10-31 PROCEDURE — 250N000013 HC RX MED GY IP 250 OP 250 PS 637: Performed by: FAMILY MEDICINE

## 2023-10-31 PROCEDURE — 99232 SBSQ HOSP IP/OBS MODERATE 35: CPT | Performed by: FAMILY MEDICINE

## 2023-10-31 PROCEDURE — 250N000011 HC RX IP 250 OP 636: Mod: JZ | Performed by: FAMILY MEDICINE

## 2023-10-31 PROCEDURE — A9541 TC99M SULFUR COLLOID: HCPCS | Performed by: PHYSICIAN ASSISTANT

## 2023-10-31 PROCEDURE — 82962 GLUCOSE BLOOD TEST: CPT

## 2023-10-31 PROCEDURE — 120N000001 HC R&B MED SURG/OB

## 2023-10-31 PROCEDURE — 258N000003 HC RX IP 258 OP 636: Performed by: INTERNAL MEDICINE

## 2023-10-31 PROCEDURE — G0378 HOSPITAL OBSERVATION PER HR: HCPCS

## 2023-10-31 PROCEDURE — 78264 GASTRIC EMPTYING IMG STUDY: CPT | Mod: MG

## 2023-10-31 PROCEDURE — 258N000001 HC RX 258: Performed by: FAMILY MEDICINE

## 2023-10-31 PROCEDURE — G1010 CDSM STANSON: HCPCS

## 2023-10-31 PROCEDURE — C9113 INJ PANTOPRAZOLE SODIUM, VIA: HCPCS | Mod: JZ | Performed by: FAMILY MEDICINE

## 2023-10-31 RX ORDER — LIDOCAINE 40 MG/G
CREAM TOPICAL
Status: DISCONTINUED | OUTPATIENT
Start: 2023-10-31 | End: 2023-11-01 | Stop reason: HOSPADM

## 2023-10-31 RX ORDER — DEXTROSE MONOHYDRATE, SODIUM CHLORIDE, AND POTASSIUM CHLORIDE 50; 1.49; 9 G/1000ML; G/1000ML; G/1000ML
INJECTION, SOLUTION INTRAVENOUS CONTINUOUS
Status: DISCONTINUED | OUTPATIENT
Start: 2023-10-31 | End: 2023-11-02

## 2023-10-31 RX ADMIN — DEXTROSE MONOHYDRATE 50 ML: 25 INJECTION, SOLUTION INTRAVENOUS at 00:18

## 2023-10-31 RX ADMIN — AMLODIPINE BESYLATE 5 MG: 5 TABLET ORAL at 16:47

## 2023-10-31 RX ADMIN — PANTOPRAZOLE SODIUM 40 MG: 40 INJECTION, POWDER, FOR SOLUTION INTRAVENOUS at 19:54

## 2023-10-31 RX ADMIN — POTASSIUM CHLORIDE, DEXTROSE MONOHYDRATE AND SODIUM CHLORIDE: 150; 5; 900 INJECTION, SOLUTION INTRAVENOUS at 09:23

## 2023-10-31 RX ADMIN — LISINOPRIL 40 MG: 10 TABLET ORAL at 08:38

## 2023-10-31 RX ADMIN — DEXTROSE MONOHYDRATE 25 ML: 25 INJECTION, SOLUTION INTRAVENOUS at 06:22

## 2023-10-31 RX ADMIN — ONDANSETRON 4 MG: 2 INJECTION INTRAMUSCULAR; INTRAVENOUS at 16:51

## 2023-10-31 RX ADMIN — PANTOPRAZOLE SODIUM 40 MG: 40 INJECTION, POWDER, FOR SOLUTION INTRAVENOUS at 08:38

## 2023-10-31 RX ADMIN — POTASSIUM CHLORIDE AND SODIUM CHLORIDE: 900; 150 INJECTION, SOLUTION INTRAVENOUS at 05:57

## 2023-10-31 RX ADMIN — POTASSIUM CHLORIDE, DEXTROSE MONOHYDRATE AND SODIUM CHLORIDE: 150; 5; 900 INJECTION, SOLUTION INTRAVENOUS at 20:22

## 2023-10-31 RX ADMIN — Medication 1.1 MILLICURIE: at 10:33

## 2023-10-31 RX ADMIN — DEXTROSE AND SODIUM CHLORIDE: 5; 900 INJECTION, SOLUTION INTRAVENOUS at 06:31

## 2023-10-31 ASSESSMENT — ACTIVITIES OF DAILY LIVING (ADL)
ADLS_ACUITY_SCORE: 20
ADLS_ACUITY_SCORE: 35
VISION_MANAGEMENT: GLASSES
ADLS_ACUITY_SCORE: 20
FALL_HISTORY_WITHIN_LAST_SIX_MONTHS: NO
ADLS_ACUITY_SCORE: 20
DOING_ERRANDS_INDEPENDENTLY_DIFFICULTY: NO
ADLS_ACUITY_SCORE: 20
DRESSING/BATHING_DIFFICULTY: NO
ADLS_ACUITY_SCORE: 20
CHANGE_IN_FUNCTIONAL_STATUS_SINCE_ONSET_OF_CURRENT_ILLNESS/INJURY: NO
ADLS_ACUITY_SCORE: 20
ADLS_ACUITY_SCORE: 20
WEAR_GLASSES_OR_BLIND: YES
WALKING_OR_CLIMBING_STAIRS_DIFFICULTY: NO
TOILETING_ISSUES: NO
CONCENTRATING,_REMEMBERING_OR_MAKING_DECISIONS_DIFFICULTY: NO
DIFFICULTY_EATING/SWALLOWING: NO
DIFFICULTY_COMMUNICATING: NO
ADLS_ACUITY_SCORE: 20
ADLS_ACUITY_SCORE: 20

## 2023-10-31 NOTE — UTILIZATION REVIEW
Admission Status; Secondary Review Determination   Under the authority of the Utilization Management Committee, the utilization review process indicated a secondary review on Dotty Villanueva. The review outcome is based on review of the medical records, discussions with staff, and applying clinical experience noted on the date of the review.   (x) Inpatient Status Appropriate - This patient's medical care is consistent with medical management for inpatient care and reasonable inpatient medical practice.     RATIONALE FOR DETERMINATION   73 year old female with PMH of laparoscopic gastric banding in 2000, esophagitis, DM 2, CKD 3, HTN, goiter, hyperlipidemia, uterine cancer and hysterectomy, nephrolithiasis who presented to the emergency room for evaluation of nausea, vomiting and weight loss.  GI consult, pending gastric emptying study on 10/31, plan for EGD tomorrow , NPO, on IV fluid and IV PPI , crossing 2nd midnight     At the time of admission with the information available to the attending physician more than 2 nights Hospital complex care was anticipated, based on patient risk of adverse outcome if treated as outpatient and complex care required. Inpatient admission is appropriate based on the Medicare guidelines.   The information on this document is developed by the utilization review team in order for the business office to ensure compliance. This only denotes the appropriateness of proper admission status and does not reflect the quality of care rendered.   The definitions of Inpatient Status and Observation Status used in making the determination above are those provided in the CMS Coverage Manual, Chapter 1 and Chapter 6, section 70.4.   Sincerely,   Sky Fernando MD  Utilization Review  Physician Advisor  [facility]

## 2023-10-31 NOTE — ED NOTES
Pt has been NPO for planned procedure. BS checked, was 66 mg/dL. IV dextrose injection given. Pt denies any hypoglycemic symptoms at this time.

## 2023-10-31 NOTE — PLAN OF CARE
Problem: Comorbidity Management  Goal: Blood Glucose Levels Within Targeted Range  Outcome: Progressing  Intervention: Monitor and Manage Glycemia  Flowsheets (Taken 10/31/2023 5040)  Glycemic Management: blood glucose monitored  Medication Review/Management:   medications reviewed   infusion initiated

## 2023-10-31 NOTE — PROGRESS NOTES
[facility] MEDICINE  PROGRESS NOTE     Code Status: Full Code       Identification/Summary:   Dotty Villanueva is a 73 year old female with PMH signficant for laparoscopic gastric banding in 2000, esophagitis, DM 2, CKD 3, HTN, goiter, hyperlipidemia, uterine cancer and hysterectomy, nephrolithiasis.  Struggles with nausea and vomiting for multiple years.  April 2023 nausea vomiting worsened.  20 pound weight loss.  9/1/2023 EGD showed severe esophagitis with ulceration at the distal esophagus, incomplete procedure as stomach was filled with food.  Received PPI twice daily.  10/30 repeat EGD after a 5-day fast showed improvement to the esophagitis but still had retained food.  Admitted.  GI recommended gastric emptying study.  Results pending.     Assessment and Plan:     Incomplete gastric emptying  Recurrent nausea vomiting  History of laparoscopic gastric banding 2000  Unintentional weight loss  Historical information as above.  Appreciate [facility] consultation.  Kept patient n.p.o. and undergoing gastric emptying study.  Hydrate with intravenous fluids.  Hold on her multiple multivitamins and Carafate.  May need EGD prior to discharge.  Further management per [facility].  Esophagitis  Continue Protonix 40 mg IV every 12 hours.  Hypokalemia  Add potassium to IV fluids and utilize potassium replacement protocol with IV medication.  Correcting.  Essential hypertension  Continue her evening p.o. Norvasc and benazepril with hold parameters.  Utilize hydralazine for severe elevations.  Type 2 diabetes  A1c 6.5.  Follow blood sugars every 4 hours.  Reasonably controlled.  Utilize n.p.o. insulin sliding scale.  Holding her Amaryl.  Hyperlipidemia  Holding her Lipitor.  Insomnia  Continue her bedtime as needed trazodone.     Anticoagulation   Hold her baby aspirin.  Pneumatic Compression Devices     COVID19 PCR not tested  Fluids: D5 NS with 20 KCl at 75 mL/h  Pain meds:  Tylenol  Therapy: N/A  Oliveira:Not present  Lines: None       Current Diet  Orders Placed This Encounter      NPO for Medical/Clinical Reasons Except for: Meds, Ice Chips    Supplements  None    Barriers to Discharge: Gastric emptying study, possible EGD, GI clearance    Disposition: To be determined    Clinically Significant Risk Factors Present on Admission        # Hypokalemia: Lowest K = 3.1 mmol/L in last 2 days, will replace as needed    # Hypercalcemia: Highest Ca = 10.5 mg/dL in last 2 days, will monitor as appropriate      # Drug Induced Platelet Defect: home medication list includes an antiplatelet medication   # Hypertension: Noted on problem list     # DMII: A1C = 6.5 % (Ref range: <5.7 %) within past 6 months               Interval History/Subjective:  Patient doing okay this morning.  Is prison through her gastric emptying study.  Denies any nausea chest pain shortness of breath.  Hopeful that she can get down to the bottom of her issues.  Questions answered to verbalized satisfaction.      Last 24H PRN:     glucose gel 15-30 g **OR** dextrose 50 % injection 25-50 mL, 50 mL at 10/31/23 0622 **OR** glucagon injection 1 mg    Physical Exam/Objective:  Temp:  [97.9  F (36.6  C)-98.4  F (36.9  C)] 98.4  F (36.9  C)  Pulse:  [62-82] 82  Resp:  [16-18] 16  BP: (144-205)/(72-90) 144/72  SpO2:  [95 %-99 %] 98 %  Wt Readings from Last 4 Encounters:   10/30/23 64 kg (141 lb)   04/25/23 64.9 kg (143 lb)     Body mass index is 24.98 kg/m .    Constitutional: awake, alert, cooperative, no apparent distress, and appears stated age  ENT: Normocephalic, without obvious abnormality, atraumatic, external ears without lesions, oral pharynx with moist mucous membranes, tonsils without erythema or exudates, gums normal and good dentition.  Respiratory: No increased work of breathing, good air exchange, clear to auscultation bilaterally, no crackles or wheezing  Cardiovascular: Normal apical impulse, regular rate and rhythm,  normal S1 and S2, no S3 or S4, and no murmur noted  GI: No scars, normal bowel sounds, soft, non-distended, non-tender, no masses palpated, no hepatosplenomegally  Skin: normal skin color, texture, turgor, no redness, warmth, or swelling, and no rashes  Musculoskeletal: There is no redness, warmth, or swelling of the joints.  Motor strength is 5 out of 5 all extremities bilaterally.  Tone is normal. no lower extremity pitting edema present  Neurologic: Cranial nerves II-XII are grossly intact. Sensory:  Sensory intact  Neuropsychiatric: General: normal, calm, and normal eye contact Level of consciousness: alert / normal Affect: normal Orientation: oriented to self, place, time and situation Memory and insight: normal, memory for past and recent events intact, and thought process normal      Medications:   Personally Reviewed.  Medications    dextrose 5% and 0.9% NaCl + KCL 20 mEq/L 75 mL/hr at 10/31/23 0923      amLODIPine  5 mg Oral Daily before supper    [Held by provider] aspirin  81 mg Oral Daily    [Held by provider] atorvastatin  60 mg Oral Daily before supper    [Held by provider] glimepiride  2 mg Oral Daily with supper    insulin aspart  1-6 Units Subcutaneous Q4H    lisinopril  40 mg Oral Daily    pantoprazole  40 mg Intravenous Q12H    [Held by provider] sucralfate  1 g Oral TID AC       Data reviewed today: I personally reviewed all new medications, labs, imaging/diagnostics reports over the past 24 hours. Pertinent findings include:    Imaging:   Recent Results (from the past 24 hour(s))   CT Abdomen Pelvis w Contrast    Narrative    EXAM: CT ABDOMEN PELVIS W CONTRAST  LOCATION: [facility]  DATE: 10/30/2023    INDICATION: early satiety, abd pain  COMPARISON: 10/26/2015 CT  TECHNIQUE: CT scan of the abdomen and pelvis was performed following injection of IV contrast. Multiplanar reformats were obtained. Dose reduction techniques were used.  CONTRAST: 100ml Isovue  370    FINDINGS:   LOWER CHEST: Slight fibrosis left lower lobe of no significance.    HEPATOBILIARY: Gallbladder removed. Mildly prominent extrahepatic bile ducts likely reservoir effect. Mild diffuse hepatic steatosis.    PANCREAS: Normal.    SPLEEN: Normal.    ADRENAL GLANDS: Normal.    KIDNEYS/BLADDER: There is a 1.8 x 1.2 cm mildly obstructing stone in the renal pelvis with some generalized thickening of the renal pelvis likely from chronic irritation. No other stones.    BOWEL: Scattered diverticulosis in the colon.    LYMPH NODES: No lymphadenopathy.    VASCULATURE: Moderate calcified plaque. No aneurysms.    PELVIC ORGANS: Postop change.    MUSCULOSKELETAL: Normal.      Impression    IMPRESSION:   1.  There is a mildly obstructing stone left renal pelvis measures 1.8 cm with some thickening of the renal pelvis consistent with chronic irritation.    2.  No other significant findings.    3.  Postop change in the stomach.       Labs:  CT Abdomen Pelvis w Contrast   Final Result   IMPRESSION:    1.  There is a mildly obstructing stone left renal pelvis measures 1.8 cm with some thickening of the renal pelvis consistent with chronic irritation.      2.  No other significant findings.      3.  Postop change in the stomach.      NM Gastric Emptying    (Results Pending)     Recent Results (from the past 24 hour(s))   Basic metabolic panel    Collection Time: 10/30/23  3:00 PM   Result Value Ref Range    Sodium 143 135 - 145 mmol/L    Potassium 3.1 (L) 3.4 - 5.3 mmol/L    Chloride 104 98 - 107 mmol/L    Carbon Dioxide (CO2) 32 (H) 22 - 29 mmol/L    Anion Gap 7 7 - 15 mmol/L    Urea Nitrogen 5.9 (L) 8.0 - 23.0 mg/dL    Creatinine 0.88 0.51 - 0.95 mg/dL    GFR Estimate 69 >60 mL/min/1.73m2    Calcium 10.5 (H) 8.8 - 10.2 mg/dL    Glucose 79 70 - 99 mg/dL   Hepatic function panel    Collection Time: 10/30/23  3:00 PM   Result Value Ref Range    Protein Total 6.1 (L) 6.4 - 8.3 g/dL    Albumin 3.5 3.5 - 5.2 g/dL     Bilirubin Total 0.4 <=1.2 mg/dL    Alkaline Phosphatase 84 35 - 104 U/L    AST 19 0 - 45 U/L    ALT 10 0 - 50 U/L    Bilirubin Direct <0.20 0.00 - 0.30 mg/dL   Lipase    Collection Time: 10/30/23  3:00 PM   Result Value Ref Range    Lipase 13 13 - 60 U/L   CBC with platelets and differential    Collection Time: 10/30/23  3:00 PM   Result Value Ref Range    WBC Count 8.7 4.0 - 11.0 10e3/uL    RBC Count 4.60 3.80 - 5.20 10e6/uL    Hemoglobin 13.4 11.7 - 15.7 g/dL    Hematocrit 41.5 35.0 - 47.0 %    MCV 90 78 - 100 fL    MCH 29.1 26.5 - 33.0 pg    MCHC 32.3 31.5 - 36.5 g/dL    RDW 12.8 10.0 - 15.0 %    Platelet Count 418 150 - 450 10e3/uL    % Neutrophils 75 %    % Lymphocytes 18 %    % Monocytes 5 %    % Eosinophils 1 %    % Basophils 1 %    % Immature Granulocytes 0 %    NRBCs per 100 WBC 0 <1 /100    Absolute Neutrophils 6.5 1.6 - 8.3 10e3/uL    Absolute Lymphocytes 1.6 0.8 - 5.3 10e3/uL    Absolute Monocytes 0.5 0.0 - 1.3 10e3/uL    Absolute Eosinophils 0.1 0.0 - 0.7 10e3/uL    Absolute Basophils 0.1 0.0 - 0.2 10e3/uL    Absolute Immature Granulocytes 0.0 <=0.4 10e3/uL    Absolute NRBCs 0.0 10e3/uL   Hemoglobin A1c    Collection Time: 10/30/23  3:00 PM   Result Value Ref Range    Hemoglobin A1C 6.5 (H) <5.7 %   Glucose by meter    Collection Time: 10/30/23  7:18 PM   Result Value Ref Range    GLUCOSE BY METER POCT 77 70 - 99 mg/dL   Potassium    Collection Time: 10/30/23 10:51 PM   Result Value Ref Range    Potassium 3.6 3.4 - 5.3 mmol/L   Glucose by meter    Collection Time: 10/30/23 11:59 PM   Result Value Ref Range    GLUCOSE BY METER POCT 66 (L) 70 - 99 mg/dL   Glucose by meter    Collection Time: 10/31/23 12:56 AM   Result Value Ref Range    GLUCOSE BY METER POCT 194 (H) 70 - 99 mg/dL   Glucose by meter    Collection Time: 10/31/23  2:07 AM   Result Value Ref Range    GLUCOSE BY METER POCT 119 (H) 70 - 99 mg/dL   Glucose by meter    Collection Time: 10/31/23  3:59 AM   Result Value Ref Range    GLUCOSE BY  METER POCT 73 70 - 99 mg/dL   Basic metabolic panel    Collection Time: 10/31/23  5:43 AM   Result Value Ref Range    Sodium 142 135 - 145 mmol/L    Potassium 3.5 3.4 - 5.3 mmol/L    Chloride 108 (H) 98 - 107 mmol/L    Carbon Dioxide (CO2) 28 22 - 29 mmol/L    Anion Gap 6 (L) 7 - 15 mmol/L    Urea Nitrogen 5.5 (L) 8.0 - 23.0 mg/dL    Creatinine 0.86 0.51 - 0.95 mg/dL    GFR Estimate 71 >60 mL/min/1.73m2    Calcium 9.2 8.8 - 10.2 mg/dL    Glucose 66 (L) 70 - 99 mg/dL   Extra Purple Top Tube    Collection Time: 10/31/23  5:43 AM   Result Value Ref Range    Hold Specimen JIC    Glucose by meter    Collection Time: 10/31/23  6:10 AM   Result Value Ref Range    GLUCOSE BY METER POCT 61 (L) 70 - 99 mg/dL   Glucose by meter    Collection Time: 10/31/23  6:39 AM   Result Value Ref Range    GLUCOSE BY METER POCT 156 (H) 70 - 99 mg/dL   Glucose by meter    Collection Time: 10/31/23  8:35 AM   Result Value Ref Range    GLUCOSE BY METER POCT 103 (H) 70 - 99 mg/dL   Glucose by meter    Collection Time: 10/31/23  1:06 PM   Result Value Ref Range    GLUCOSE BY METER POCT 142 (H) 70 - 99 mg/dL       Pending Labs:  Unresulted Labs Ordered in the Past 30 Days of this Admission       No orders found for last 31 day(s).              Yakov Hidalgo MD  [facility]  Phone: #[phone]

## 2023-10-31 NOTE — PROGRESS NOTES
"PRIMARY DIAGNOSIS: \"GENERIC\" NURSING  OUTPATIENT/OBSERVATION GOALS TO BE MET BEFORE DISCHARGE:  ADLs back to baseline: Yes    Activity and level of assistance: Ambulating independently.    Pain status: Pain free.    Return to near baseline physical activity: Yes     Discharge Planner Nurse   Safe discharge environment identified: Yes  Barriers to discharge: Yes       Entered by: Jailene Fernandes RN 10/31/2023 8:59 AM     Patient denies pain. Ambulating independently. IV D5NS infusing. Plan for nuclear med study today.   "

## 2023-10-31 NOTE — PROGRESS NOTES
"PRIMARY DIAGNOSIS: \"GENERIC\" NURSING  OUTPATIENT/OBSERVATION GOALS TO BE MET BEFORE DISCHARGE:  ADLs back to baseline: Yes    Activity and level of assistance: Ambulating independently.    Pain status: Pain free.    Return to near baseline physical activity: Yes     Discharge Planner Nurse   Safe discharge environment identified: Yes  Barriers to discharge: Yes       Entered by: Val Sims RN 10/31/2023 2:47 AM     Please review provider order for any additional goals.   Nurse to notify provider when observation goals have been met and patient is ready for discharge.  "

## 2023-10-31 NOTE — PROGRESS NOTES
"[facility] Progress Note     SUBJECTIVE:    The patient report doing well overnight. She denies any significant abdominal pain. She denies any nausea or vomiting. She has remained NPO. She reports having a bowel movement this morning that was somewhat loose. She notes her baseline is 1-2 bowel movements daily that are typically \"rabbit turds\" in consistency. She denies any melena or hematochezia. She denies any heartburn or reflux since started omeprazole and sucralfate a couple of months ago. She reports occasional dysphagia, but denies any episodes of regurgitation.     OBJECTIVE:  BP (!) 171/73 (BP Location: Right arm)   Pulse 75   Temp 97.9  F (36.6  C) (Oral)   Resp 16   Ht 1.6 m (5' 3\")   Wt 64 kg (141 lb)   SpO2 95%   BMI 24.98 kg/m    Temp (24hrs), Av.1  F (36.7  C), Min:97.9  F (36.6  C), Max:98.3  F (36.8  C)    Patient Vitals for the past 72 hrs:   Weight   10/30/23 1314 64 kg (141 lb)       Intake/Output Summary (Last 24 hours) at 10/31/2023 1003  Last data filed at 10/31/2023 0923  Gross per 24 hour   Intake 1713 ml   Output --   Net 1713 ml        PHYSICAL EXAM  GEN: NAD, sitting upright in bed  HRT: no LE edema  RESP: unlabored  ABD: mild left upper abdominal tenderness, no guarding, remainder of abdomen soft and non-tender  SKIN: Visualized skin intact, no rash    Additional Data:  I have reviewed the patient's new clinical lab results:     Recent Labs   Lab Test 10/30/23  1500 23  1054   WBC 8.7 7.2   HGB 13.4 12.6   MCV 90 89    348     Recent Labs   Lab Test 10/31/23  0543 10/30/23  2251 10/30/23  1500   POTASSIUM 3.5 3.6 3.1*   CHLORIDE 108*  --  104   CO2 28  --  32*   BUN 5.5*  --  5.9*   ANIONGAP 6*  --  7     Recent Labs   Lab Test 10/30/23  1500 23  1230   ALBUMIN 3.5  --    BILITOTAL 0.4  --    ALT 10  --    AST 19  --    PROTEIN  --  Negative   LIPASE 13  --        Imaging results:    CT abdomen pelvis w/ contrast 10/30/2023  FINDINGS:   LOWER " CHEST: Slight fibrosis left lower lobe of no significance.     HEPATOBILIARY: Gallbladder removed. Mildly prominent extrahepatic bile ducts likely reservoir effect. Mild diffuse hepatic steatosis.     PANCREAS: Normal.     SPLEEN: Normal.     ADRENAL GLANDS: Normal.     KIDNEYS/BLADDER: There is a 1.8 x 1.2 cm mildly obstructing stone in the renal pelvis with some generalized thickening of the renal pelvis likely from chronic irritation. No other stones.     BOWEL: Scattered diverticulosis in the colon.     LYMPH NODES: No lymphadenopathy.     VASCULATURE: Moderate calcified plaque. No aneurysms.     PELVIC ORGANS: Postop change.     MUSCULOSKELETAL: Normal.                                                                IMPRESSION:   1.  There is a mildly obstructing stone left renal pelvis measures 1.8 cm with some thickening of the renal pelvis consistent with chronic irritation.  2.  No other significant findings.  3.  Postop change in the stomach.    Gastric emptying study 10/31/2023  Pending    IMPRESSION:  Dotty Villanueva is a 73-year-old female with a history of type 2 diabetes, CKD stage III, hyperlipidemia, osteopenia, hypertension, and history of bariatric surgery (lap band surgery done in 2000), as well as cholecystectomy, appendectomy, and hysterectomy.  The patient was sent to the emergency department yesterday after an attempted EGD that revealed significant amounts of solid food and liquid in the stomach, despite being n.p.o. for several days prior.  Differential includes gastroparesis, gastric outlet obstruction such as pyloric stenosis or malignancy, among others.  CT abdomen and pelvis was done yesterday that was not revealing of any findings to explain gastric retention.  She is scheduled for a gastric emptying study today, and has remained n.p.o. during this admission.  Ideally, we would like to go forward with repeating EGD to further evaluate during this admission.  We discussed that this may  involve an NG tube if contents continue to remain in the stomach.  The patient is amenable to this.  Recommend continuing IV PPI twice daily for erosive esophagitis seen on EGD, gastric emptying study as planned today, npo at midnight with plan for EGD tomorrow.     PLAN:  - IV PPI BID  - Gastric emptying study today  - npo at midnight  - EGD tomorrow     (Dr. Leon)  Mariela Bertrand PA-C  [facility]  10/31/2023 10:03 AM  [phone] (office)    25 minutes of total time was spent providing patient care, including patient evaluation, reviewing documentation/test results, , and documentation.  ________________________________________________________________________

## 2023-11-01 ENCOUNTER — ANESTHESIA EVENT (OUTPATIENT)
Dept: SURGERY | Facility: CLINIC | Age: 73
DRG: 074 | End: 2023-11-01
Payer: MEDICARE

## 2023-11-01 ENCOUNTER — ANESTHESIA (OUTPATIENT)
Dept: SURGERY | Facility: CLINIC | Age: 73
DRG: 074 | End: 2023-11-01
Payer: MEDICARE

## 2023-11-01 PROBLEM — K31.84 GASTROPARESIS: Status: ACTIVE | Noted: 2023-11-01

## 2023-11-01 LAB
ANION GAP SERPL CALCULATED.3IONS-SCNC: 4 MMOL/L (ref 7–15)
BUN SERPL-MCNC: 5.2 MG/DL (ref 8–23)
CALCIUM SERPL-MCNC: 9.5 MG/DL (ref 8.8–10.2)
CHLORIDE SERPL-SCNC: 108 MMOL/L (ref 98–107)
CREAT SERPL-MCNC: 0.86 MG/DL (ref 0.51–0.95)
DEPRECATED HCO3 PLAS-SCNC: 27 MMOL/L (ref 22–29)
EGFRCR SERPLBLD CKD-EPI 2021: 71 ML/MIN/1.73M2
GLUCOSE BLDC GLUCOMTR-MCNC: 153 MG/DL (ref 70–99)
GLUCOSE BLDC GLUCOMTR-MCNC: 158 MG/DL (ref 70–99)
GLUCOSE BLDC GLUCOMTR-MCNC: 167 MG/DL (ref 70–99)
GLUCOSE BLDC GLUCOMTR-MCNC: 169 MG/DL (ref 70–99)
GLUCOSE BLDC GLUCOMTR-MCNC: 178 MG/DL (ref 70–99)
GLUCOSE BLDC GLUCOMTR-MCNC: 183 MG/DL (ref 70–99)
GLUCOSE BLDC GLUCOMTR-MCNC: 190 MG/DL (ref 70–99)
GLUCOSE SERPL-MCNC: 163 MG/DL (ref 70–99)
HOLD SPECIMEN: NORMAL
POTASSIUM SERPL-SCNC: 3.8 MMOL/L (ref 3.4–5.3)
SODIUM SERPL-SCNC: 139 MMOL/L (ref 135–145)
UPPER GI ENDOSCOPY: NORMAL

## 2023-11-01 PROCEDURE — 36415 COLL VENOUS BLD VENIPUNCTURE: CPT | Performed by: FAMILY MEDICINE

## 2023-11-01 PROCEDURE — 250N000011 HC RX IP 250 OP 636: Mod: JZ | Performed by: FAMILY MEDICINE

## 2023-11-01 PROCEDURE — 258N000003 HC RX IP 258 OP 636: Performed by: STUDENT IN AN ORGANIZED HEALTH CARE EDUCATION/TRAINING PROGRAM

## 2023-11-01 PROCEDURE — 82310 ASSAY OF CALCIUM: CPT | Performed by: FAMILY MEDICINE

## 2023-11-01 PROCEDURE — 710N000010 HC RECOVERY PHASE 1, LEVEL 2, PER MIN: Performed by: INTERNAL MEDICINE

## 2023-11-01 PROCEDURE — 0DB78ZX EXCISION OF STOMACH, PYLORUS, VIA NATURAL OR ARTIFICIAL OPENING ENDOSCOPIC, DIAGNOSTIC: ICD-10-PCS | Performed by: INTERNAL MEDICINE

## 2023-11-01 PROCEDURE — 272N000001 HC OR GENERAL SUPPLY STERILE: Performed by: INTERNAL MEDICINE

## 2023-11-01 PROCEDURE — 120N000001 HC R&B MED SURG/OB

## 2023-11-01 PROCEDURE — C1726 CATH, BAL DIL, NON-VASCULAR: HCPCS | Performed by: INTERNAL MEDICINE

## 2023-11-01 PROCEDURE — C9113 INJ PANTOPRAZOLE SODIUM, VIA: HCPCS | Mod: JZ | Performed by: FAMILY MEDICINE

## 2023-11-01 PROCEDURE — 99232 SBSQ HOSP IP/OBS MODERATE 35: CPT | Performed by: FAMILY MEDICINE

## 2023-11-01 PROCEDURE — 258N000001 HC RX 258: Performed by: FAMILY MEDICINE

## 2023-11-01 PROCEDURE — 360N000075 HC SURGERY LEVEL 2, PER MIN: Performed by: INTERNAL MEDICINE

## 2023-11-01 PROCEDURE — 250N000011 HC RX IP 250 OP 636: Performed by: STUDENT IN AN ORGANIZED HEALTH CARE EDUCATION/TRAINING PROGRAM

## 2023-11-01 PROCEDURE — 88305 TISSUE EXAM BY PATHOLOGIST: CPT | Mod: TC | Performed by: INTERNAL MEDICINE

## 2023-11-01 PROCEDURE — 250N000013 HC RX MED GY IP 250 OP 250 PS 637: Performed by: FAMILY MEDICINE

## 2023-11-01 PROCEDURE — 250N000012 HC RX MED GY IP 250 OP 636 PS 637: Performed by: FAMILY MEDICINE

## 2023-11-01 PROCEDURE — 250N000009 HC RX 250: Performed by: STUDENT IN AN ORGANIZED HEALTH CARE EDUCATION/TRAINING PROGRAM

## 2023-11-01 PROCEDURE — 370N000017 HC ANESTHESIA TECHNICAL FEE, PER MIN: Performed by: INTERNAL MEDICINE

## 2023-11-01 PROCEDURE — 250N000025 HC SEVOFLURANE, PER MIN: Performed by: INTERNAL MEDICINE

## 2023-11-01 PROCEDURE — 999N000141 HC STATISTIC PRE-PROCEDURE NURSING ASSESSMENT: Performed by: INTERNAL MEDICINE

## 2023-11-01 PROCEDURE — 0D768ZZ DILATION OF STOMACH, VIA NATURAL OR ARTIFICIAL OPENING ENDOSCOPIC: ICD-10-PCS | Performed by: INTERNAL MEDICINE

## 2023-11-01 RX ORDER — SODIUM CHLORIDE, SODIUM LACTATE, POTASSIUM CHLORIDE, CALCIUM CHLORIDE 600; 310; 30; 20 MG/100ML; MG/100ML; MG/100ML; MG/100ML
INJECTION, SOLUTION INTRAVENOUS CONTINUOUS
Status: DISCONTINUED | OUTPATIENT
Start: 2023-11-01 | End: 2023-11-01 | Stop reason: HOSPADM

## 2023-11-01 RX ORDER — HYDROMORPHONE HCL IN WATER/PF 6 MG/30 ML
0.2 PATIENT CONTROLLED ANALGESIA SYRINGE INTRAVENOUS EVERY 5 MIN PRN
Status: DISCONTINUED | OUTPATIENT
Start: 2023-11-01 | End: 2023-11-01 | Stop reason: HOSPADM

## 2023-11-01 RX ORDER — HALOPERIDOL 5 MG/ML
1 INJECTION INTRAMUSCULAR
Status: DISCONTINUED | OUTPATIENT
Start: 2023-11-01 | End: 2023-11-01 | Stop reason: HOSPADM

## 2023-11-01 RX ORDER — FENTANYL CITRATE 50 UG/ML
50 INJECTION, SOLUTION INTRAMUSCULAR; INTRAVENOUS EVERY 5 MIN PRN
Status: DISCONTINUED | OUTPATIENT
Start: 2023-11-01 | End: 2023-11-01 | Stop reason: HOSPADM

## 2023-11-01 RX ORDER — FENTANYL CITRATE 50 UG/ML
INJECTION, SOLUTION INTRAMUSCULAR; INTRAVENOUS PRN
Status: DISCONTINUED | OUTPATIENT
Start: 2023-11-01 | End: 2023-11-01

## 2023-11-01 RX ORDER — ONDANSETRON 4 MG/1
4 TABLET, ORALLY DISINTEGRATING ORAL EVERY 30 MIN PRN
Status: CANCELLED | OUTPATIENT
Start: 2023-11-01

## 2023-11-01 RX ORDER — PROPOFOL 10 MG/ML
INJECTION, EMULSION INTRAVENOUS PRN
Status: DISCONTINUED | OUTPATIENT
Start: 2023-11-01 | End: 2023-11-01

## 2023-11-01 RX ORDER — FENTANYL CITRATE 50 UG/ML
25 INJECTION, SOLUTION INTRAMUSCULAR; INTRAVENOUS EVERY 5 MIN PRN
Status: DISCONTINUED | OUTPATIENT
Start: 2023-11-01 | End: 2023-11-01 | Stop reason: HOSPADM

## 2023-11-01 RX ORDER — LORAZEPAM 2 MG/ML
.5-1 INJECTION INTRAMUSCULAR
Status: DISCONTINUED | OUTPATIENT
Start: 2023-11-01 | End: 2023-11-01 | Stop reason: HOSPADM

## 2023-11-01 RX ORDER — KETOROLAC TROMETHAMINE 30 MG/ML
15 INJECTION, SOLUTION INTRAMUSCULAR; INTRAVENOUS
Status: DISCONTINUED | OUTPATIENT
Start: 2023-11-01 | End: 2023-11-01 | Stop reason: HOSPADM

## 2023-11-01 RX ORDER — LIDOCAINE 40 MG/G
CREAM TOPICAL
Status: DISCONTINUED | OUTPATIENT
Start: 2023-11-01 | End: 2023-11-01 | Stop reason: HOSPADM

## 2023-11-01 RX ORDER — MEPERIDINE HYDROCHLORIDE 25 MG/ML
12.5 INJECTION INTRAMUSCULAR; INTRAVENOUS; SUBCUTANEOUS EVERY 5 MIN PRN
Status: DISCONTINUED | OUTPATIENT
Start: 2023-11-01 | End: 2023-11-01 | Stop reason: HOSPADM

## 2023-11-01 RX ORDER — FENTANYL CITRATE 50 UG/ML
25 INJECTION, SOLUTION INTRAMUSCULAR; INTRAVENOUS
Status: CANCELLED | OUTPATIENT
Start: 2023-11-01

## 2023-11-01 RX ORDER — SODIUM CHLORIDE, SODIUM LACTATE, POTASSIUM CHLORIDE, CALCIUM CHLORIDE 600; 310; 30; 20 MG/100ML; MG/100ML; MG/100ML; MG/100ML
INJECTION, SOLUTION INTRAVENOUS CONTINUOUS PRN
Status: DISCONTINUED | OUTPATIENT
Start: 2023-11-01 | End: 2023-11-01

## 2023-11-01 RX ORDER — ONDANSETRON 2 MG/ML
4 INJECTION INTRAMUSCULAR; INTRAVENOUS EVERY 30 MIN PRN
Status: DISCONTINUED | OUTPATIENT
Start: 2023-11-01 | End: 2023-11-01 | Stop reason: HOSPADM

## 2023-11-01 RX ORDER — HYDROMORPHONE HCL IN WATER/PF 6 MG/30 ML
0.4 PATIENT CONTROLLED ANALGESIA SYRINGE INTRAVENOUS EVERY 5 MIN PRN
Status: DISCONTINUED | OUTPATIENT
Start: 2023-11-01 | End: 2023-11-01 | Stop reason: HOSPADM

## 2023-11-01 RX ORDER — LIDOCAINE HYDROCHLORIDE 10 MG/ML
INJECTION, SOLUTION INFILTRATION; PERINEURAL PRN
Status: DISCONTINUED | OUTPATIENT
Start: 2023-11-01 | End: 2023-11-01

## 2023-11-01 RX ORDER — ONDANSETRON 2 MG/ML
4 INJECTION INTRAMUSCULAR; INTRAVENOUS EVERY 30 MIN PRN
Status: CANCELLED | OUTPATIENT
Start: 2023-11-01

## 2023-11-01 RX ORDER — ONDANSETRON 4 MG/1
4 TABLET, ORALLY DISINTEGRATING ORAL EVERY 30 MIN PRN
Status: DISCONTINUED | OUTPATIENT
Start: 2023-11-01 | End: 2023-11-01 | Stop reason: HOSPADM

## 2023-11-01 RX ADMIN — PROPOFOL 100 MG: 10 INJECTION, EMULSION INTRAVENOUS at 11:51

## 2023-11-01 RX ADMIN — PANTOPRAZOLE SODIUM 40 MG: 40 INJECTION, POWDER, FOR SOLUTION INTRAVENOUS at 20:55

## 2023-11-01 RX ADMIN — INSULIN ASPART 1 UNITS: 100 INJECTION, SOLUTION INTRAVENOUS; SUBCUTANEOUS at 16:33

## 2023-11-01 RX ADMIN — FENTANYL CITRATE 100 MCG: 50 INJECTION INTRAMUSCULAR; INTRAVENOUS at 11:51

## 2023-11-01 RX ADMIN — POTASSIUM CHLORIDE, DEXTROSE MONOHYDRATE AND SODIUM CHLORIDE: 150; 5; 900 INJECTION, SOLUTION INTRAVENOUS at 10:14

## 2023-11-01 RX ADMIN — ACETAMINOPHEN 650 MG: 325 TABLET ORAL at 03:40

## 2023-11-01 RX ADMIN — PANTOPRAZOLE SODIUM 40 MG: 40 INJECTION, POWDER, FOR SOLUTION INTRAVENOUS at 07:27

## 2023-11-01 RX ADMIN — LIDOCAINE HYDROCHLORIDE 50 MG: 10 INJECTION, SOLUTION INFILTRATION; PERINEURAL at 11:51

## 2023-11-01 RX ADMIN — SODIUM CHLORIDE, POTASSIUM CHLORIDE, SODIUM LACTATE AND CALCIUM CHLORIDE: 600; 310; 30; 20 INJECTION, SOLUTION INTRAVENOUS at 11:45

## 2023-11-01 RX ADMIN — SUCRALFATE 1 G: 1 SUSPENSION ORAL at 20:54

## 2023-11-01 RX ADMIN — ATORVASTATIN CALCIUM 60 MG: 40 TABLET, FILM COATED ORAL at 16:33

## 2023-11-01 RX ADMIN — HYDRALAZINE HYDROCHLORIDE 10 MG: 20 INJECTION, SOLUTION INTRAMUSCULAR; INTRAVENOUS at 00:04

## 2023-11-01 RX ADMIN — POTASSIUM CHLORIDE, DEXTROSE MONOHYDRATE AND SODIUM CHLORIDE: 150; 5; 900 INJECTION, SOLUTION INTRAVENOUS at 13:02

## 2023-11-01 RX ADMIN — LISINOPRIL 40 MG: 10 TABLET ORAL at 08:16

## 2023-11-01 RX ADMIN — AMLODIPINE BESYLATE 5 MG: 5 TABLET ORAL at 16:33

## 2023-11-01 RX ADMIN — Medication 100 MG: at 11:51

## 2023-11-01 RX ADMIN — INSULIN ASPART 1 UNITS: 100 INJECTION, SOLUTION INTRAVENOUS; SUBCUTANEOUS at 20:54

## 2023-11-01 RX ADMIN — ONDANSETRON 4 MG: 2 INJECTION INTRAMUSCULAR; INTRAVENOUS at 00:12

## 2023-11-01 RX ADMIN — ONDANSETRON 4 MG: 2 INJECTION INTRAMUSCULAR; INTRAVENOUS at 07:27

## 2023-11-01 ASSESSMENT — ACTIVITIES OF DAILY LIVING (ADL)
ADLS_ACUITY_SCORE: 20

## 2023-11-01 NOTE — INTERVAL H&P NOTE
I have reviewed the surgical (or preoperative) H&P that is linked to this encounter, and examined the patient. There are no significant changes    Clinical Conditions Present on Arrival:  Clinically Significant Risk Factors Present on Admission        # Hypokalemia: Lowest K = 3.1 mmol/L in last 2 days, will replace as needed   # Hypercalcemia: Highest Ca = 10.5 mg/dL in last 2 days, will monitor as appropriate        # Drug Induced Platelet Defect: home medication list includes an antiplatelet medication  # DMII: A1C = 6.5 % (Ref range: <5.7 %) within past 6 months

## 2023-11-01 NOTE — PROGRESS NOTES
"PRIMARY DIAGNOSIS: \"GENERIC\" NURSING  OUTPATIENT/OBSERVATION GOALS TO BE MET BEFORE DISCHARGE:  ADLs back to baseline: Yes    Activity and level of assistance: Ambulating independently.    Pain status: Pain free.    Return to near baseline physical activity: Yes     Discharge Planner Nurse   Safe discharge environment identified: Yes  Barriers to discharge: Yes       Entered by: Val Sims RN 11/01/2023 2:53 AM     Please review provider order for any additional goals.   Nurse to notify provider when observation goals have been met and patient is ready for discharge.  " Ventricular Rate : 88   Atrial Rate : 88   P-R Interval : 146   QRS Duration : 80   Q-T Interval : 378   QTC Calculation(Bezet) : 457   P Axis : 29   R Axis : 75   T Axis : 41   Diagnosis : Normal sinus rhythm~Normal ECG~No previous ECGs available~Confirmed by MD JODIE, A ([id number]) on 9/26/2017 9:29:21 PM

## 2023-11-01 NOTE — ANESTHESIA PROCEDURE NOTES
Airway       Patient location during procedure: OR       Procedure Start/Stop Times: 11/1/2023 11:53 AM  Staff -        Anesthesiologist:  Thang Leonardo MD       CRNA: Tena Orlando APRN CRNA       Performed By: CRNA  Consent for Airway        Urgency: elective  Indications and Patient Condition       Indications for airway management: manisha-procedural       Induction type:intravenous       Mask difficulty assessment: 0 - not attempted    Final Airway Details       Final airway type: endotracheal airway       Successful airway: ETT - single  Endotracheal Airway Details        ETT size (mm): 7.0       Cuffed: yes       Successful intubation technique: video laryngoscopy       VL Blade Size: Glidescope 3       Grade View of Cords: 1       Adjucts: stylet       Position: Left       Measured from: gums/teeth       Secured at (cm): 21       Bite block used: None    Post intubation assessment        Placement verified by: capnometry, equal breath sounds and chest rise        Number of attempts at approach: 1       Number of other approaches attempted: 0       Secured with: silk tape       Ease of procedure: easy       Dentition: Unchanged and Intact    Medication(s) Administered   Medication Administration Time: 11/1/2023 11:53 AM

## 2023-11-01 NOTE — PROGRESS NOTES
[facility] MEDICINE  PROGRESS NOTE     Code Status: Full Code  Procedure(s):  ESOPHAGOGASTRODUODENOSCOPY WITH BIOPSY AND BALLOON DILATION  Day of Surgery  Identification/Summary:   Dotty Villanueva is a 73 year old female with PMH signficant for laparoscopic gastric banding in 2000, esophagitis, DM 2, CKD 3, HTN, goiter, hyperlipidemia, uterine cancer and hysterectomy, nephrolithiasis.  Struggles with nausea and vomiting for multiple years.  April 2023 nausea vomiting worsened.  20 pound weight loss.  9/1/2023 EGD showed severe esophagitis with ulceration at the distal esophagus, incomplete procedure as stomach was filled with food.  Received PPI twice daily.  10/30 repeat EGD after a 5-day fast showed improvement to the esophagitis but still had retained food.  [facility] consulted.  Abnormal gastric emptying study shows severe gastroparesis.  EGD showed improvement to esophagitis and a stricture gastric pouch which was dilated.  Instituting Carafate and slowly advancing diet.  Monitor response.     Assessment and Plan:     Incomplete gastric emptying  Recurrent nausea vomiting  History of laparoscopic gastric banding 2000  Unintentional weight loss  Gastroparesis  Historical information as above.  Appreciate [facility] consultation.  Kept patient n.p.o.  Hydrate with intravenous fluids.  Hold on her multiple multivitamins and Carafate.  Abnormal gastric emptying study consistent with severe gastroparesis.  EGD showed improvement to esophagitis and dilated a tight gastric band.  Resume Carafate.  Slowly ADA T.  Monitor response.  Esophagitis  Continue Protonix 40 mg IV every 12 hours.  Hypokalemia  Add potassium to IV fluids and utilize potassium replacement protocol with IV medication.  Correcting.  Essential hypertension  Continue her evening p.o. Norvasc and benazepril with hold parameters.  Utilize hydralazine for severe elevations.  Type 2 diabetes  A1c 6.5.  Follow  blood sugars every 4 hours.  Reasonably controlled.  Utilize n.p.o. insulin sliding scale.  Holding her Amaryl.  Hyperlipidemia  Holding her Lipitor.  Insomnia  Continue her bedtime as needed trazodone.     Anticoagulation   Hold her baby aspirin.  Pneumatic Compression Devices     COVID19 PCR not tested  Fluids: D5 NS with 20 KCl at 75 mL/h  Pain meds: Tylenol  Therapy: N/A   Oliveira:Not present  Lines: None       Current Diet  None    Supplements  None    Barriers to Discharge: Dietary advancement and response    Disposition: Hopeful discharge 11/2    Clinically Significant Risk Factors Present on Admission        # Hypokalemia: Lowest K = 3.1 mmol/L in last 2 days, will replace as needed    # Hypercalcemia: Highest Ca = 10.5 mg/dL in last 2 days, will monitor as appropriate      # Drug Induced Platelet Defect: home medication list includes an antiplatelet medication   # Hypertension: Noted on problem list     # DMII: A1C = 6.5 % (Ref range: <5.7 %) within past 6 months               Interval History/Subjective:  Patient doing well after EGD.  Hopeful that dilation will correct things.  Looking forward to advancing diet.  No chest pain.  No shortness of breath.  No nausea at this time.  Agreeable to staying overnight.  Questions answered to verbalized satisfaction.      Last 24H PRN:     acetaminophen (TYLENOL) tablet 650 mg, 650 mg at 11/01/23 0340 **OR** acetaminophen (TYLENOL) Suppository 650 mg    hydrALAZINE (APRESOLINE) injection 10 mg, 10 mg at 11/01/23 0004    ondansetron (ZOFRAN ODT) ODT tab 4 mg **OR** ondansetron (ZOFRAN) injection 4 mg, 4 mg at 11/01/23 0727    Physical Exam/Objective:  Temp:  [98.2  F (36.8  C)-98.6  F (37  C)] 98.5  F (36.9  C)  Pulse:  [] 83  Resp:  [14-18] 14  BP: (126-179)/() 168/80  SpO2:  [93 %-100 %] 93 %  Wt Readings from Last 4 Encounters:   10/30/23 64 kg (141 lb)   04/25/23 64.9 kg (143 lb)     Body mass index is 24.98 kg/m .    Constitutional: awake, alert,  cooperative, no apparent distress, and appears stated age  ENT: Normocephalic, without obvious abnormality, atraumatic, external ears without lesions, oral pharynx with moist mucous membranes, tonsils without erythema or exudates, gums normal and good dentition.  Respiratory: No increased work of breathing, good air exchange, clear to auscultation bilaterally, no crackles or wheezing  Cardiovascular: Normal apical impulse, regular rate and rhythm, normal S1 and S2, no S3 or S4, and no murmur noted  GI: No scars, normal bowel sounds, soft, non-distended, non-tender, no masses palpated, no hepatosplenomegally  Skin: normal skin color, texture, turgor, no redness, warmth, or swelling, and no rashes  Musculoskeletal: There is no redness, warmth, or swelling of the joints.  Motor strength is 5 out of 5 all extremities bilaterally.  Tone is normal. no lower extremity pitting edema present  Neurologic: Cranial nerves II-XII are grossly intact. Sensory:  Sensory intact  Neuropsychiatric: General: normal, calm, and normal eye contact Level of consciousness: alert / normal Affect: normal Orientation: oriented to self, place, time and situation Memory and insight: normal, memory for past and recent events intact, and thought process normal      Medications:   Personally Reviewed.  Medications    dextrose 5% and 0.9% NaCl + KCL 20 mEq/L 75 mL/hr at 11/01/23 1014      amLODIPine  5 mg Oral Daily before supper    [Held by provider] aspirin  81 mg Oral Daily    [Held by provider] atorvastatin  60 mg Oral Daily before supper    [Held by provider] glimepiride  2 mg Oral Daily with supper    insulin aspart  1-6 Units Subcutaneous Q4H    lisinopril  40 mg Oral Daily    pantoprazole  40 mg Intravenous Q12H    [Held by provider] sucralfate  1 g Oral TID AC       Data reviewed today: I personally reviewed all new medications, labs, imaging/diagnostics reports over the past 24 hours. Pertinent findings include:    Imaging:   Recent  Results (from the past 24 hour(s))   NM Gastric Emptying    Narrative    EXAM: NM GASTRIC EMPTYING  LOCATION: [facility]  DATE: 10/31/2023    INDICATION: early satiety, failed EGD d t emptying   COMPARISON: CT 10/30/2023.  TECHNIQUE: 1.0 mCi of technetium-99m sulfur colloid labeled egg product, PO. Anterior and posterior planar imaging for four hours. Geometric mean of emptying/retention calculated.    FINDINGS: The stomach is scintigraphically normal. No evidence of gastroesophageal reflux.    PATIENT'S RETENTION: 1 hour = 97%, 2 hours = 95%, 4 hours = 98%  NORMAL RETENTION: 1 hour = 30-90%, 2 hours = <60%, 4 hours = <10%      Impression    IMPRESSION:     Markedly abnormal study. At 4 hours, no significant gastric emptying was observed.       Labs:  NM Gastric Emptying   Final Result   IMPRESSION:       Markedly abnormal study. At 4 hours, no significant gastric emptying was observed.      CT Abdomen Pelvis w Contrast   Final Result   IMPRESSION:    1.  There is a mildly obstructing stone left renal pelvis measures 1.8 cm with some thickening of the renal pelvis consistent with chronic irritation.      2.  No other significant findings.      3.  Postop change in the stomach.        Recent Results (from the past 24 hour(s))   Glucose by meter    Collection Time: 10/31/23  1:06 PM   Result Value Ref Range    GLUCOSE BY METER POCT 142 (H) 70 - 99 mg/dL   Glucose by meter    Collection Time: 10/31/23  4:09 PM   Result Value Ref Range    GLUCOSE BY METER POCT 165 (H) 70 - 99 mg/dL   Glucose by meter    Collection Time: 10/31/23  8:00 PM   Result Value Ref Range    GLUCOSE BY METER POCT 166 (H) 70 - 99 mg/dL   Glucose by meter    Collection Time: 10/31/23 10:03 PM   Result Value Ref Range    GLUCOSE BY METER POCT 164 (H) 70 - 99 mg/dL   Glucose by meter    Collection Time: 11/01/23 12:12 AM   Result Value Ref Range    GLUCOSE BY METER POCT 190 (H) 70 - 99 mg/dL   Glucose by meter    Collection  Time: 11/01/23  2:05 AM   Result Value Ref Range    GLUCOSE BY METER POCT 169 (H) 70 - 99 mg/dL   Glucose by meter    Collection Time: 11/01/23  3:35 AM   Result Value Ref Range    GLUCOSE BY METER POCT 183 (H) 70 - 99 mg/dL   Glucose by meter    Collection Time: 11/01/23  5:54 AM   Result Value Ref Range    GLUCOSE BY METER POCT 158 (H) 70 - 99 mg/dL   Basic metabolic panel    Collection Time: 11/01/23  6:27 AM   Result Value Ref Range    Sodium 139 135 - 145 mmol/L    Potassium 3.8 3.4 - 5.3 mmol/L    Chloride 108 (H) 98 - 107 mmol/L    Carbon Dioxide (CO2) 27 22 - 29 mmol/L    Anion Gap 4 (L) 7 - 15 mmol/L    Urea Nitrogen 5.2 (L) 8.0 - 23.0 mg/dL    Creatinine 0.86 0.51 - 0.95 mg/dL    GFR Estimate 71 >60 mL/min/1.73m2    Calcium 9.5 8.8 - 10.2 mg/dL    Glucose 163 (H) 70 - 99 mg/dL   Extra Purple Top Tube    Collection Time: 11/01/23  6:27 AM   Result Value Ref Range    Hold Specimen JIC    Glucose by meter    Collection Time: 11/01/23  8:31 AM   Result Value Ref Range    GLUCOSE BY METER POCT 178 (H) 70 - 99 mg/dL   UPPER GI ENDOSCOPY    Collection Time: 11/01/23 11:28 AM   Result Value Ref Range    Upper GI Endoscopy       [facility]  [address] [place] [address]  _______________________________________________________________________________  Patient Name: Dotty Villanueva          Procedure Date: 11/1/2023 11:28 AM  MRN: [id number]                       Account Number: [id number]  YOB: 1950               Admit Type: Inpatient  Age: 73                               Room: [place] [address]  Note Status: Finalized                Attending MD: DELICIA MAYBERRY , ,   Total Sedation Time:                  Instrument Name: EGD Scope 2064  _______________________________________________________________________________     Procedure:             Upper GI endoscopy  Indications:           Epigastric abdominal pain, Nausea with vomiting  Providers:             DELICIA  JEFE Mcdowell MD:            Medicines:             Monitored Anesthesia Care  Complications:         No immediate complications. Estimated blood loss: None.  _______________________________________________ ________________________________  Procedure:             Pre-Anesthesia Assessment:                         - Prior to the procedure, a History and Physical was                          performed, and patient medications, allergies and                          sensitivities were reviewed. The patient's tolerance                          of previous anesthesia was reviewed.                         - The risks and benefits of the procedure and the                          sedation options and risks were discussed with the                          patient. All questions were answered and informed                          consent was obtained.                         - Mental Status Examination: alert and oriented.                          Airway Examination: normal oropharyngeal airway and                          neck mobility. Respiratory Examination: clear to                          auscultation. CV Examination: normal. Abdominal                          Examination: bowel sounds pres ent, abdomen soft and                          non-tender, no masses or organomegaly noted.                         - ASA Grade Assessment: III - A patient with severe                          systemic disease.                         - After reviewing the risks and benefits, the patient                          was deemed in satisfactory condition to undergo the                          procedure.                         - The anesthesia plan was to use monitored anesthesia                          care (MAC).                         - Immediately prior to administration of medications,                          the patient was re-assessed for adequacy to receive                          sedatives.                         -  Sedation was administered by an anesthesia                          professional. Deep sedation was attained.                         - The heart rate, respiratory rate, oxygen                          saturations, blood pressure, adequacy of pulmonary                           ventilation, and response to care were monitored                          throughout the procedure.                         - The physical status of the patient was re-assessed                          after the procedure.                         After obtaining informed consent, the endoscope was                          passed under direct vision. Throughout the procedure,                          the patient's blood pressure, pulse, and oxygen                          saturations were monitored continuously. The endoscope                          was introduced through the mouth, and advanced to the                          third part of duodenum. The upper GI endoscopy was                          accomplished without difficulty. The patient tolerated                          the procedure well.                                                                                   Findings:       LA Grade B (one or more mucosal breaks greater than 5 mm, not extending         between the tops of two mucosal folds) esophagitis with no bleeding was        found in the distal esophagus.       The exam of the esophagus was otherwise normal.       The Z-line was regular and was found 40 cm from the incisors.       Evidence of a patent vertical banded gastroplasty was found. A gastric        pouch with a medium size was found containing food debris. The staple        line appeared intact. Evidence of a Silastic band was not seen and        appeared tight. This was traversed. A TTS dilator was passed through the        scope. Dilation with a 12-13.5-15 mm balloon dilator was performed to 15        mm.       Localized mildly erythematous mucosa without  bleeding was found in the        gastric antrum. Biopsies were taken with a cold forceps for Helicobacter        pylori testing.       The examined duodenum was normal.                                                                                   Moderate Sedation:       See Anesthesia Note  Impression:             - LA Grade B reflux esophagitis with no bleeding.                         - Z-line regular, 40 cm from the incisors.                         - Patent vertical banded gastroplasty with a                          medium-sized pouch and intact staple line and band                          appears tight. Dilated.                         - Erythematous mucosa in the antrum. Biopsied.                         - Normal examined duodenum.  Recommendation:        - Return patient to [facility] for ongoing care.                         - Await pathology results.                         - Based upon the nuclear medicine gastric emptying                          scan done yesterday, it appears that she has severe                          gastroparesis. While the Lap-Band is a bit tight, this                          area was dilated, and there does not appear to be any                          significant gastric outlet obstruction to explain her                          persistent nausea and  vomiting.                         - Would recommend treatment of gastroparesis, with                          better diabetic control, as well as consideration for                          medication such as Reglan, starting this 5 mg 3 times                          a day with meals. Other medication such as                          erythromycin may also need to be considered for the                          long-term.                         - If these above-mentioned medications are unhelpful                          in improving her symptoms, then as an outpatient,                          consideration may need to be  given for her to be                          evaluated by the surgery team, for takedown of the                          Lap-Band.                         - Another option, may be consideration for Enterra                          neuro gastric stimulator placement, for her severe                          gastroparesis, though again, this would be complicated                           by her pre-existing Lap-band. This can be discussed as                          an outpatient.                         - She will benefit from smaller, more frequent meals,                          rather than 3 regular meals a day. She will benefit                          from food that is more in the terms of liquid,                          smoothie, soup or soft consistency. Please get a                          dietician consultation during this hospitalization to                          discuss a gastroparetic diet with her.                         - Please continue PPI therapy with omeprazole 20 mg po                          twice a day                                                                                     Delicia Leon MD  _________________  DELICIA LEON,   11/1/2023 12:22:07 PM  Number of Addenda: 0    Note Initiated On: 11/1/2023 11:28 AM  Scope In: 12:00:26 PM  Scope Out: 12:09:13 PM         Pending Labs:  Unresulted Labs Ordered in the Past 30 Days of this Admission       Date and Time Order Name Status Description    11/1/2023 12:03 PM Surgical Pathology Exam In process               Yakov Hidalgo MD  [facility]  Phone: #[phone]

## 2023-11-01 NOTE — ANESTHESIA PREPROCEDURE EVALUATION
Anesthesia Pre-Procedure Evaluation    Patient: Dotty Villanueva   MRN: [id number] : 1950        Procedure : Procedure(s):  ESOPHAGOGASTRODUODENOSCOPY          Past Medical History:   Diagnosis Date    Esophagitis     Essential hypertension     Hyperlipidemia     Malignant neoplasm of skin 2018    Formatting of this note might be different from the original. 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 Observe    Pulmonary nodules 2016    Formatting of this note might be different from the original. Stable on CT for 24 months, per [place] [facility], no follow up needed 2016    Renal calculi 2017    Stage 3 chronic kidney disease (H) 10/30/2023    Formatting of this note might be different from the original. 3a    Toxic multinodular goiter 10/30/2023    Formatting of this note might be different from the original. partially supressed TSH --0.08;0.18 ; 0.11 10/07 TSH supressed  bilateral > 2cm dominant nodules- on us - negative fna bilateral  13% uptake with cold nodule right  51 mCi -08    Type 2 diabetes mellitus (H)       Past Surgical History:   Procedure Laterality Date    APPENDECTOMY      CHOLECYSTECTOMY      HAND CONTRACTURE RELEASE Left     HYSTERECTOMY      Complicated by bowel laceration.  Required colonic repair.    LAPAROSCOPIC GASTRIC BANDING      LEEP TX, CERVICAL      THYROID BIOPSY      TUBAL LIGATION        Allergies   Allergen Reactions    Enalapril Cough    Morphine Rash     And itching    Penicillins Nausea and Vomiting and Rash      Social History     Tobacco Use    Smoking status: Never    Smokeless tobacco: Never   Substance Use Topics    Alcohol use: Yes     Comment: Rare      Wt Readings from Last 1 Encounters:   10/30/23 64 kg (141 lb)        Anesthesia Evaluation   Pt has had prior anesthetic.     No history of anesthetic complications       ROS/MED HX  ENT/Pulmonary:  - neg pulmonary ROS     Neurologic:  - neg neurologic ROS    "  Cardiovascular:  - neg cardiovascular ROS   (+)  hypertension- -   -  - -                                      METS/Exercise Tolerance: >4 METS    Hematologic:  - neg hematologic  ROS     Musculoskeletal:  - neg musculoskeletal ROS     GI/Hepatic: Comment: Chronic NV, early satiety. Denies NV today     Lack of gastric emptying.  - neg GI/hepatic ROS   (+) GERD,                   Renal/Genitourinary:  - neg Renal ROS   (+) renal disease, type: CRI,            Endo:  - neg endo ROS   (+)  type II DM,                    Psychiatric/Substance Use:  - neg psychiatric ROS     Infectious Disease:  - neg infectious disease ROS     Malignancy:  - neg malignancy ROS     Other:  - neg other ROS          Physical Exam    Airway  airway exam normal           Respiratory Devices and Support         Dental       (+) Minor Abnormalities - some fillings, tiny chips      Cardiovascular   cardiovascular exam normal          Pulmonary   pulmonary exam normal                OUTSIDE LABS:  CBC:   Lab Results   Component Value Date    WBC 8.7 10/30/2023    WBC 7.2 04/25/2023    HGB 13.4 10/30/2023    HGB 12.6 04/25/2023    HCT 41.5 10/30/2023    HCT 37.3 04/25/2023     10/30/2023     04/25/2023     BMP:   Lab Results   Component Value Date     11/01/2023     10/31/2023    POTASSIUM 3.8 11/01/2023    POTASSIUM 3.5 10/31/2023    CHLORIDE 108 (H) 11/01/2023    CHLORIDE 108 (H) 10/31/2023    CO2 27 11/01/2023    CO2 28 10/31/2023    BUN 5.2 (L) 11/01/2023    BUN 5.5 (L) 10/31/2023    CR 0.86 11/01/2023    CR 0.86 10/31/2023     (H) 11/01/2023     (H) 11/01/2023     COAGS: No results found for: \"PTT\", \"INR\", \"FIBR\"  POC: No results found for: \"BGM\", \"HCG\", \"HCGS\"  HEPATIC:   Lab Results   Component Value Date    ALBUMIN 3.5 10/30/2023    PROTTOTAL 6.1 (L) 10/30/2023    ALT 10 10/30/2023    AST 19 10/30/2023    ALKPHOS 84 10/30/2023    BILITOTAL 0.4 10/30/2023     OTHER:   Lab Results   Component Value " Date    A1C 6.5 (H) 10/30/2023    VONNIE 9.5 11/01/2023    LIPASE 13 10/30/2023       Anesthesia Plan    ASA Status:  3    NPO Status:  ELEVATED Aspiration Risk/Unknown    Anesthesia Type: General.     - Airway: ETT   Induction: RSI.           Consents    Anesthesia Plan(s) and associated risks, benefits, and realistic alternatives discussed. Questions answered and patient/representative(s) expressed understanding.     - Discussed:     - Discussed with:  Patient            Postoperative Care    Pain management: IV analgesics.   PONV prophylaxis: Ondansetron (or other 5HT-3), Dexamethasone or Solumedrol     Comments:    Other Comments:               Thang Leonardo MD

## 2023-11-01 NOTE — ANESTHESIA POSTPROCEDURE EVALUATION
Patient: Dotty Villanueva    Procedure: Procedure(s):  ESOPHAGOGASTRODUODENOSCOPY WITH BIOPSY AND BALLOON DILATION       Anesthesia Type:  General    Note:  Disposition: Outpatient   Postop Pain Control: Uneventful            Sign Out: Well controlled pain   PONV: No   Neuro/Psych: Uneventful            Sign Out: Acceptable/Baseline neuro status   Airway/Respiratory: Uneventful            Sign Out: Acceptable/Baseline resp. status   CV/Hemodynamics: Uneventful            Sign Out: Acceptable CV status; No obvious hypovolemia; No obvious fluid overload   Other NRE: NONE   DID A NON-ROUTINE EVENT OCCUR? No           Last vitals:  Vitals Value Taken Time   /71 11/01/23 1230   Temp 36.8  C (98.3  F) 11/01/23 1217   Pulse 97 11/01/23 1226   Resp 36 11/01/23 1226   SpO2 94 % 11/01/23 1226   Vitals shown include unfiled device data.    Electronically Signed By: Thang Leonardo MD  November 1, 2023  12:59 PM

## 2023-11-01 NOTE — PLAN OF CARE
"PRIMARY DIAGNOSIS: \"GENERIC\" NURSING  OUTPATIENT/OBSERVATION GOALS TO BE MET BEFORE DISCHARGE:  ADLs back to baseline: Yes    Activity and level of assistance: Ambulating independently.    Pain status: Complained of headache. PRN Tylenol administered with complete relief     Return to near baseline physical activity: Yes     Discharge Planner Nurse   Safe discharge environment identified: Yes  Barriers to discharge: Yes       Entered by: Val Sims RN 11/01/2023 3:57 AM     Please review provider order for any additional goals.   Nurse to notify provider when observation goals have been met and patient is ready for discharge.    Problem: Comorbidity Management  Goal: Blood Glucose Levels Within Targeted Range  Outcome: Progressing  Intervention: Monitor and Manage Glycemia  Recent Flowsheet Documentation  Taken 10/31/2023 2358 by Val Sims RN  Glycemic Management: blood glucose monitored  Medication Review/Management: medications reviewed  Taken 10/31/2023 2000 by Val Sims RN  Glycemic Management: blood glucose monitored  Medication Review/Management: medications reviewed     Problem: Adult Inpatient Plan of Care  Goal: Readiness for Transition of Care  Outcome: Progressing   Goal Outcome Evaluation:  Patient A&O x4, able to make needs known. VSS on RA, elevated BPs. PRN Hydralazine administered, BP trending down after administration (see flowsheet). Complained of 6/10 headache, PRN Tylenol administered with complete relief, per patient. Left PIV infusing D5 and NS plus KCl 20 mEq at 75mL/hr. Complained of nausea at midnight, PRN Zofran administered with complete relief, per patient. Q4h blood sugar checks, patient refusing insulin coverage, MD made aware. Educated patient on the importance of maintaining blood sugar levels. K protocol, recheck in the morning. NPO for EGD this morning. Independent with activity. Alarms off. Discharge pending.       "

## 2023-11-01 NOTE — ANESTHESIA CARE TRANSFER NOTE
Patient: Dotty Villanueva    Procedure: Procedure(s):  ESOPHAGOGASTRODUODENOSCOPY WITH BIOPSY AND BALLOON DILATION       Diagnosis: Early satiety [R68.81]  Esophagitis [K20.90]  Diagnosis Additional Information: No value filed.    Anesthesia Type:   General     Note:    Oropharynx: oropharynx clear of all foreign objects  Level of Consciousness: awake  Oxygen Supplementation: room air    Independent Airway: airway patency satisfactory and stable  Dentition: dentition unchanged  Vital Signs Stable: post-procedure vital signs reviewed and stable  Report to RN Given: handoff report given  Patient transferred to: PACU    Handoff Report: Identifed the Patient, Identified the Reponsible Provider, Reviewed the pertinent medical history, Discussed the surgical course, Reviewed Intra-OP anesthesia mangement and issues during anesthesia, Set expectations for post-procedure period and Allowed opportunity for questions and acknowledgement of understanding      Vitals:  Vitals Value Taken Time   /104 11/01/23 1220   Temp 36.8  C (98.3  F) 11/01/23 1217   Pulse 104 11/01/23 1220   Resp 14 11/01/23 1220   SpO2 96 % 11/01/23 1220   Vitals shown include unfiled device data.    Electronically Signed By: KIRSTIN Garg CRNA  November 1, 2023  12:21 PM

## 2023-11-01 NOTE — PROGRESS NOTES
"PRIMARY DIAGNOSIS: \"GENERIC\" NURSING  OUTPATIENT/OBSERVATION GOALS TO BE MET BEFORE DISCHARGE:  ADLs back to baseline: Yes    Activity and level of assistance: Ambulating independently.    Pain status: Pain free.    Return to near baseline physical activity: Yes     Discharge Planner Nurse   Safe discharge environment identified: Yes  Barriers to discharge: Yes       Entered by: Val Sims RN 10/31/2023 10:44 PM     Please review provider order for any additional goals.   Nurse to notify provider when observation goals have been met and patient is ready for discharge.  "

## 2023-11-01 NOTE — PLAN OF CARE
Problem: Comorbidity Management  Goal: Blood Glucose Levels Within Targeted Range  Outcome: Progressing  Intervention: Monitor and Manage Glycemia  Flowsheets (Taken 11/1/2023 9172)  Glycemic Management:   blood glucose monitored   insulin dose matched to carbohydrate intake  Medication Review/Management: medications reviewed   Patient had EGD today. No complaints of nausea after procedure. Patient has full liquid diet. Attempting to eat dinner this evening, but states she is feeling full. VSS. UAL. D5NS with K running at 75ml/hr. No complaints of pain. Patient is agreeable to taking insulin after speaking with Dr. Hidalgo this afternoon.

## 2023-11-01 NOTE — BRIEF OP NOTE
[facility]    Brief Operative Note    Pre-operative diagnosis: Early satiety [R68.81]  Esophagitis [K20.90]  Post-operative diagnosis esophagitis, nausea and vomiting.    Procedure: ESOPHAGOGASTRODUODENOSCOPY WITH BIOPSY AND BALLOON DILATION, N/A - Esophagus    Surgeon: Surgeon(s) and Role:     * Mika Leon MD - Primary  Anesthesia: MAC   Estimated Blood Loss: None    Drains: None  Specimens:   ID Type Source Tests Collected by Time Destination   1 :  Biopsy Stomach SURGICAL PATHOLOGY EXAM Mika Leon MD 11/1/2023 12:03 PM      Findings:     1.  Esophagitis .  2.  Prior Lap-Band bariatric surgery noted.  3.  Food debris in gastric pouch  4.  Mild gastropathy  5.  Normal duodenum    Complications: None.  Implants: * No implants in log *    Mika Leon MD on 11/1/2023 at 12:23 PM  [facility]

## 2023-11-01 NOTE — PROVIDER NOTIFICATION
Paged Dr. Mccrary patient is refusing any insulin coverage when blood sugar is checked. I educated the patient on the importance of maintaining acceptable blood sugar levels. Please advise. Thanks SUHAS Neal [phone]

## 2023-11-01 NOTE — CONSULTS
"NUTRITION EDUCATION      REASON FOR ASSESSMENT:  gastroparaesis dietary recommendations.     NUTRITION HISTORY:  Information obtained from patient and spouse    Met with pt and spouse in room this afternoon. Writer and pt reviewed Gastroparesis Nutrition Therapy guidelines to help patient choose foods that will minimize contributing to her chronic gastroparesis symptoms. Pt reports that she doesn't eat meat most of the time because \"it just gets stuck in my stomach, and then comes back up\". Pt reports that she relies mostly on soups and cottage cheese for nutrition, as these are soft/liquid foods, and are generally better tolerated. Pt reviewed list of \"recommended foods\" on handout, and expects to be able to adhere to these once discharged. Writer recommended pt try to incorporate small, soft/moist portions of meat back into her diet, as she doesn't have many low-fat/low-fiber sources of protein in her current diet.    CURRENT DIET:  Full liquid diet    NUTRITION DIAGNOSIS:  Food- and nutrition-related knowledge deficit R/t low exposure to gastroparesis guidelines AEB pt report of eating high fiber/fat foods and c/o chronic gastroparesis     INTERVENTIONS:    Nutrition Prescription:  Low fiber/low fat diet    Implementation:      *  Nutrition Education (Content):   A)  Provided handout Gastroparesis Nutrition Therapy   B)  Discussed (see above paragraph)      *  Nutrition Education (Application):   A)  Discussed current eating habits and recommended alternative food choices      *  Anticipate fair compliance      *  Diet Education - refer to Education Flowsheet    Goals:      *  Patient will verbalize understanding of diet by identifying foods on the \"recommended food list\" of handout that she will eat at home      *  All of the above goals met during the education session    Follow Up/Monitoring:      *  Provided RD contact information for future questions      *  Recommended Out-Patient Nutrition Referral, if " further diet instructions are needed

## 2023-11-01 NOTE — PROGRESS NOTES
Brief Procedure Note    Please see provation note for procedure details    Findings:    Esophagus: LA Grade B (one or more mucosal breaks greater than 5 mm, not extending between the tops of two mucosal folds) esophagitis with no bleeding was found in the distal esophagus. The exam of the esophagus was otherwise normal.   The Z-line was regular and was found 40 cm from the incisors.     Stomach: Evidence of a patent vertical banded gastroplasty was found. A gastric pouch with a medium size was found containing food debris. The staple line appeared intact. Evidence of a Silastic band was seen and appeared tight. This was traversed. A TTS dilator was passed through the scope. Dilation with a 12-13.5-15 mm balloon dilator was performed to 15 mm.   Localized mildly erythematous mucosa without bleeding was found in the gastric antrum. Biopsies were taken with a cold forceps for Helicobacter pylori testing.     Duodenum: The examined duodenum was normal.      Impression:           - LA Grade B reflux esophagitis with no bleeding.   - Z-line regular, 40 cm from the incisors.   - Patent vertical banded gastroplasty with a medium-sized pouch and intact staple line and band appears tight. Dilated.   - Erythematous mucosa in the antrum. Biopsied.   - Normal examined duodenum.     Recommendation:     - Return patient to hospital [facility] for ongoing care.   -  Await pathology results.   - Based upon the nuclear medicine gastric emptying scan done yesterday, it appears that she has severe gastroparesis. While the Lap-Band is a bit tight, this area was dilated, and there does not appear to be any significant gastric outlet obstruction to explain her persistent nausea and vomiting.   - Would recommend treatment of gastroparesis, with better diabetic control, as well as consideration for medication such as Reglan, starting this 5 mg 3 times a day with meals. Other medication such as erythromycin may also need to be considered for the  long-term.   - If these above-mentioned medications are unhelpful in improving her symptoms, then as an outpatient, consideration may need to be given for her to be evaluated by the surgery team, for takedown of the Lap-Band.   - Another option, may be consideration for Enterra neuro gastric stimulator placement, for her severe gastroparesis, though again, this would be complicated by her pre-existing Lap-band. This can be discussed as an outpatient.   - She will benefit from smaller, more frequent meals, rather than 3 regular meals a day. She will benefit from food that is more in the terms of liquid, smoothie, soup or soft consistency. Please get a dietician consultation during this hospitalization to discuss a gastroparetic diet with her.   - Please continue PPI therapy with omeprazole 20 mg po twice a day     Mika Leon MD on 11/1/2023 at 12:30 PM  [facility]

## 2023-11-02 VITALS
HEIGHT: 63 IN | WEIGHT: 141 LBS | BODY MASS INDEX: 24.98 KG/M2 | RESPIRATION RATE: 16 BRPM | HEART RATE: 82 BPM | OXYGEN SATURATION: 98 % | SYSTOLIC BLOOD PRESSURE: 137 MMHG | DIASTOLIC BLOOD PRESSURE: 80 MMHG | TEMPERATURE: 98.2 F

## 2023-11-02 LAB
ANION GAP SERPL CALCULATED.3IONS-SCNC: 6 MMOL/L (ref 7–15)
BUN SERPL-MCNC: 5.1 MG/DL (ref 8–23)
CALCIUM SERPL-MCNC: 9.4 MG/DL (ref 8.8–10.2)
CHLORIDE SERPL-SCNC: 110 MMOL/L (ref 98–107)
CREAT SERPL-MCNC: 0.89 MG/DL (ref 0.51–0.95)
DEPRECATED HCO3 PLAS-SCNC: 24 MMOL/L (ref 22–29)
EGFRCR SERPLBLD CKD-EPI 2021: 68 ML/MIN/1.73M2
GLUCOSE BLDC GLUCOMTR-MCNC: 114 MG/DL (ref 70–99)
GLUCOSE BLDC GLUCOMTR-MCNC: 138 MG/DL (ref 70–99)
GLUCOSE BLDC GLUCOMTR-MCNC: 161 MG/DL (ref 70–99)
GLUCOSE BLDC GLUCOMTR-MCNC: 189 MG/DL (ref 70–99)
GLUCOSE SERPL-MCNC: 149 MG/DL (ref 70–99)
HOLD SPECIMEN: NORMAL
PATH REPORT.COMMENTS IMP SPEC: NORMAL
PATH REPORT.FINAL DX SPEC: NORMAL
PATH REPORT.GROSS SPEC: NORMAL
PATH REPORT.MICROSCOPIC SPEC OTHER STN: NORMAL
PATH REPORT.RELEVANT HX SPEC: NORMAL
PHOTO IMAGE: NORMAL
POTASSIUM SERPL-SCNC: 3.9 MMOL/L (ref 3.4–5.3)
SODIUM SERPL-SCNC: 140 MMOL/L (ref 135–145)

## 2023-11-02 PROCEDURE — 88342 IMHCHEM/IMCYTCHM 1ST ANTB: CPT | Mod: 26 | Performed by: PATHOLOGY

## 2023-11-02 PROCEDURE — 250N000011 HC RX IP 250 OP 636: Mod: JZ | Performed by: FAMILY MEDICINE

## 2023-11-02 PROCEDURE — 250N000013 HC RX MED GY IP 250 OP 250 PS 637: Performed by: FAMILY MEDICINE

## 2023-11-02 PROCEDURE — 36415 COLL VENOUS BLD VENIPUNCTURE: CPT | Performed by: FAMILY MEDICINE

## 2023-11-02 PROCEDURE — C9113 INJ PANTOPRAZOLE SODIUM, VIA: HCPCS | Mod: JZ | Performed by: FAMILY MEDICINE

## 2023-11-02 PROCEDURE — 80048 BASIC METABOLIC PNL TOTAL CA: CPT | Performed by: FAMILY MEDICINE

## 2023-11-02 PROCEDURE — 99239 HOSP IP/OBS DSCHRG MGMT >30: CPT | Performed by: FAMILY MEDICINE

## 2023-11-02 PROCEDURE — 258N000001 HC RX 258: Performed by: FAMILY MEDICINE

## 2023-11-02 PROCEDURE — 88305 TISSUE EXAM BY PATHOLOGIST: CPT | Mod: 26 | Performed by: PATHOLOGY

## 2023-11-02 RX ORDER — PANTOPRAZOLE SODIUM 40 MG/1
40 TABLET, DELAYED RELEASE ORAL
Status: DISCONTINUED | OUTPATIENT
Start: 2023-11-02 | End: 2023-11-02 | Stop reason: ALTCHOICE

## 2023-11-02 RX ORDER — ONDANSETRON 4 MG/1
4 TABLET, ORALLY DISINTEGRATING ORAL EVERY 6 HOURS PRN
Qty: 30 TABLET | Refills: 0 | Status: SHIPPED | OUTPATIENT
Start: 2023-11-02

## 2023-11-02 RX ORDER — PANTOPRAZOLE SODIUM 40 MG/1
40 TABLET, DELAYED RELEASE ORAL
Status: DISCONTINUED | OUTPATIENT
Start: 2023-11-02 | End: 2023-11-02 | Stop reason: HOSPADM

## 2023-11-02 RX ORDER — METOCLOPRAMIDE 5 MG/1
5 TABLET ORAL
Qty: 90 TABLET | Refills: 0 | Status: ON HOLD | OUTPATIENT
Start: 2023-11-02 | End: 2024-03-11

## 2023-11-02 RX ORDER — PANTOPRAZOLE SODIUM 40 MG/1
40 TABLET, DELAYED RELEASE ORAL
Qty: 60 TABLET | Refills: 0 | Status: SHIPPED | OUTPATIENT
Start: 2023-11-02 | End: 2024-04-03

## 2023-11-02 RX ORDER — METOCLOPRAMIDE 5 MG/1
5 TABLET ORAL
Status: DISCONTINUED | OUTPATIENT
Start: 2023-11-02 | End: 2023-11-02 | Stop reason: HOSPADM

## 2023-11-02 RX ADMIN — LISINOPRIL 40 MG: 10 TABLET ORAL at 07:48

## 2023-11-02 RX ADMIN — SUCRALFATE 1 G: 1 SUSPENSION ORAL at 07:48

## 2023-11-02 RX ADMIN — POTASSIUM CHLORIDE, DEXTROSE MONOHYDRATE AND SODIUM CHLORIDE: 150; 5; 900 INJECTION, SOLUTION INTRAVENOUS at 00:37

## 2023-11-02 RX ADMIN — INSULIN ASPART 1 UNITS: 100 INJECTION, SOLUTION INTRAVENOUS; SUBCUTANEOUS at 07:48

## 2023-11-02 RX ADMIN — ACETAMINOPHEN 650 MG: 325 TABLET ORAL at 00:30

## 2023-11-02 RX ADMIN — PANTOPRAZOLE SODIUM 40 MG: 40 INJECTION, POWDER, FOR SOLUTION INTRAVENOUS at 07:48

## 2023-11-02 RX ADMIN — INSULIN ASPART 1 UNITS: 100 INJECTION, SOLUTION INTRAVENOUS; SUBCUTANEOUS at 00:30

## 2023-11-02 ASSESSMENT — ACTIVITIES OF DAILY LIVING (ADL)
ADLS_ACUITY_SCORE: 20

## 2023-11-02 NOTE — DISCHARGE SUMMARY
[facility] MEDICINE  DISCHARGE SUMMARY     Primary Care Physician: Lilia Doyle  Admission Date: 10/30/2023   Discharge Provider: Yakov Hidalgo MD Discharge Date: 11/2/2023    Diet:   Active Diet and Nourishment Order   Procedures    Regular Diet Adult    Diet   Frequent small meals Code Status: Full Code   Activity: DCACTIVITY: Activity as tolerated        Condition at Discharge: Stable     REASON FOR PRESENTATION(See Admission Note for Details)   Nausea vomiting and abdominal pain    PRINCIPAL & ACTIVE DISCHARGE DIAGNOSES     Principal Problem:    Nausea and vomiting  Active Problems:    Essential hypertension    Type 2 diabetes mellitus with microalbuminuria, without long-term current use of insulin (H)    Early satiety    Esophagitis    Type 2 diabetes mellitus (H)    GERD (gastroesophageal reflux disease)    Gastric outlet obstruction    Gastroparesis  Unintentional weight loss  Hypokalemia  Essential hypertension  Hyperlipidemia  Insomnia    Clinically Significant Risk Factors                  # Hypertension: Noted on problem list       # DMII: A1C = 6.5 % (Ref range: <5.7 %) within past 6 months, PRESENT ON ADMISSION             PENDING LABS     Unresulted Labs Ordered in the Past 30 Days of this Admission       Date and Time Order Name Status Description    11/1/2023 12:03 PM Surgical Pathology Exam In process           PROCEDURES ( this hospitalization only)    Procedure(s):  ESOPHAGOGASTRODUODENOSCOPY WITH BIOPSY AND BALLOON DILATION      RECOMMENDATIONS TO OUTPATIENT PROVIDER FOR F/U VISIT   Follow-up Appointments     Follow-up and recommended labs and tests       1.  Follow-up with [facility] per their recommendations.  2.  Follow-up with primary care provider in 1 week.  Review glucose   readings.            DISPOSITION     Home    SUMMARY OF HOSPITAL COURSE:      Dotty Villanueva is a 73 year old female with PMH signficant for laparoscopic gastric banding in  2000, esophagitis, DM 2, CKD 3, HTN, goiter, hyperlipidemia, uterine cancer and hysterectomy, nephrolithiasis.  Struggles with nausea and vomiting for multiple years.  April 2023 nausea vomiting worsened.  20 pound weight loss.  9/1/2023 EGD showed severe esophagitis with ulceration at the distal esophagus, incomplete procedure as stomach was filled with food.  Received PPI twice daily.  10/30 repeat EGD after a 5-day fast showed improvement to the esophagitis but still had retained food.  Directed to the emergency room for admission.      1.  GI.  [facility] consulted.  Abnormal gastric emptying study shows severe gastroparesis.  Subsequent EGD showed improvement to esophagitis and a stricture at the gastric pouch which was dilated.  Instituting Carafate, Reglan and slowly advancing diet.  Patient did well afterwards and was tolerating low residue diet.  Ready for discharge home.  Close follow-up with [facility].    2.  Diabetes.  Blood sugars were elevated as her Amaryl was held during her stay.  Now that she is tolerating oral intake Amaryl will be resumed.  Critical importance of diabetic control and how it relates to her gastroparesis was discussed with the patient.  Will be following her sugars closely and plan to follow-up with primary care provider.    Otherwise medically stable.      Discharge Medications with Med changes:     Current Discharge Medication List        START taking these medications    Details   metoclopramide (REGLAN) 5 MG tablet Take 1 tablet (5 mg) by mouth 3 times daily (before meals)  Qty: 90 tablet, Refills: 0    Associated Diagnoses: Gastroparesis      ondansetron (ZOFRAN ODT) 4 MG ODT tab Take 1 tablet (4 mg) by mouth every 6 hours as needed for nausea or vomiting  Qty: 30 tablet, Refills: 0    Associated Diagnoses: Gastroparesis; Gastric outlet obstruction      pantoprazole (PROTONIX) 40 MG EC tablet Take 1 tablet (40 mg) by mouth 2 times daily (before meals)  Qty: 60 tablet,  "Refills: 0    Associated Diagnoses: Gastroparesis; Gastric outlet obstruction           CONTINUE these medications which have NOT CHANGED    Details   amLODIPine (NORVASC) 5 MG tablet Take 5 mg by mouth daily (before supper)      aspirin (ASA) 81 MG chewable tablet Take 81 mg by mouth daily      atorvastatin (LIPITOR) 20 MG tablet Take 60 mg by mouth daily (before supper)      benazepril (LOTENSIN) 40 MG tablet Take 40 mg by mouth daily (before supper)      calcium-magnesium (CALMAG) 500-250 MG TABS per tablet Take 1 tablet by mouth daily (before supper)      childrens multivitamin (ANIMAL SHAPES) CHEW chewable tablet Take 1 tablet by mouth daily (before supper)      cholecalciferol (VITAMIN D3) 125 mcg (5000 units) capsule Take 250 mcg by mouth daily (before supper)      co-enzyme Q-10 100 MG CAPS capsule Take 100 mg by mouth daily (before supper)      cyanocobalamin (CYANOCOBALAMIN) 1000 MCG/ML injection Inject 1 mL into the muscle every 7 days      glimepiride (AMARYL) 2 MG tablet Take 2 mg by mouth daily (with dinner)      sucralfate (CARAFATE) 1 GM/10ML suspension Take 1 g by mouth 3 times daily (before meals)      traZODone (DESYREL) 100 MG tablet Take 100 mg by mouth nightly as needed for sleep      zinc gluconate 50 MG tablet Take 50 mg by mouth daily (before supper)           STOP taking these medications       omeprazole (PRILOSEC) 40 MG DR capsule Comments:   Reason for Stopping:                 Rationale for medication changes:      Medications for gastroparesis treatment and reflux      Consults     GASTROENTEROLOGY IP CONSULT  NUTRITION SERVICES ADULT IP CONSULT      Immunizations given this encounter     Most Recent Immunizations   Administered Date(s) Administered    COVID-19 MONOVALENT 12+ (Pfizer) 12/01/2021    COVID-19 Monovalent 12+ (Pfizer 2022) 08/17/2022           Anticoagulation Information      Recent INR results: No results for input(s): \"INR\" in the last 168 hours.  Warfarin doses (if " "applicable) or name of other anticoagulant: Aspirin 81 daily      SIGNIFICANT IMAGING FINDINGS     Results for orders placed or performed during the hospital encounter of 10/30/23   CT Abdomen Pelvis w Contrast    Impression    IMPRESSION:   1.  There is a mildly obstructing stone left renal pelvis measures 1.8 cm with some thickening of the renal pelvis consistent with chronic irritation.    2.  No other significant findings.    3.  Postop change in the stomach.   NM Gastric Emptying    Impression    IMPRESSION:     Markedly abnormal study. At 4 hours, no significant gastric emptying was observed.       SIGNIFICANT LABORATORY FINDINGS     Most Recent 3 CBC's:  Recent Labs   Lab Test 10/30/23  1500 04/25/23  1054   WBC 8.7 7.2   HGB 13.4 12.6   MCV 90 89    348     Most Recent 3 BMP's:  Recent Labs   Lab Test 11/02/23  0923 11/02/23  0615 11/02/23  0609 11/01/23  0831 11/01/23  0627 10/31/23  0610 10/31/23  0543   NA  --  140  --   --  139  --  142   POTASSIUM  --  3.9  --   --  3.8  --  3.5   CHLORIDE  --  110*  --   --  108*  --  108*   CO2  --  24  --   --  27  --  28   BUN  --  5.1*  --   --  5.2*  --  5.5*   CR  --  0.89  --   --  0.86  --  0.86   ANIONGAP  --  6*  --   --  4*  --  6*   VONNIE  --  9.4  --   --  9.5  --  9.2   * 149* 161*   < > 163*   < > 66*    < > = values in this interval not displayed.     Most Recent 2 LFT's:  Recent Labs   Lab Test 10/30/23  1500   AST 19   ALT 10   ALKPHOS 84   BILITOTAL 0.4     Most Recent 3 INR's:No lab results found.  Most Recent TSH and T4:No lab results found.  Most Recent Hemoglobin A1c:  Recent Labs   Lab Test 10/30/23  1500   A1C 6.5*     Most Recent 6 glucoses:  Recent Labs   Lab Test 11/02/23  0923 11/02/23  0615 11/02/23  0609 11/02/23  0358 11/02/23  0015 11/01/23 2026   * 149* 161* 138* 189* 167*     No results found for: \"CTROPT\"   7-Day Micro Results       No results found for the last 168 hours.              Discharge Orders      "   Reason for your hospital stay    Nausea vomiting, gastric outlet obstruction, gastroparesis and stricture dilation     Follow-up and recommended labs and tests     1.  Follow-up with [facility] per their recommendations.  2.  Follow-up with primary care provider in 1 week.  Review glucose readings.     Activity    Your activity upon discharge: activity as tolerated     When to contact your care team    Call your primary doctor if you have any of the following: temperature greater than 100.5 or abnormal glucose readings either too high or too low.  Call [facility] if you have any of the following: increased swelling, increased pain, or nausea vomiting.     Monitor and record    blood glucose 4 times a day, before meals and at bedtime     Diet    Follow this diet upon discharge: Orders Placed This Encounter      Regular Diet Adult.  [place] soft food choices.  Eat frequent small meals.       Examination   Physical Exam   Temp:  [98.2  F (36.8  C)-98.7  F (37.1  C)] 98.2  F (36.8  C)  Pulse:  [] 82  Resp:  [14-18] 16  BP: (137-169)/() 137/80  SpO2:  [93 %-100 %] 98 %  Wt Readings from Last 4 Encounters:   10/30/23 64 kg (141 lb)   04/25/23 64.9 kg (143 lb)       Constitutional: awake, alert, cooperative, no apparent distress, and appears stated age  Eyes: Lids and lashes normal, pupils equal, round and reactive to light, extra ocular muscles intact, sclera clear, conjunctiva normal  ENT: Normocephalic, without obvious abnormality, atraumatic, sinuses nontender on palpation, external ears without lesions, oral pharynx with moist mucous membranes, tonsils without erythema or exudates, gums normal and good dentition.  Respiratory: No increased work of breathing, good air exchange, clear to auscultation bilaterally, no crackles or wheezing  Cardiovascular: Normal apical impulse, regular rate and rhythm, normal S1 and S2, no S3 or S4, and no murmur noted  GI: No scars, normal bowel sounds, soft,  non-distended, non-tender, no masses palpated, no hepatosplenomegally  Skin: normal skin color, texture, turgor, no redness, warmth, or swelling, and no rashes  Musculoskeletal: There is no redness, warmth, or swelling of the joints.  Full range of motion noted.  Motor strength is 5 out of 5 all extremities bilaterally.  Tone is normal. no lower extremity pitting edema present  Neurologic: Cranial nerves II-XII are grossly intact. Sensory:  Sensory intact  Neuropsychiatric: General: normal, calm, and normal eye contact Level of consciousness: alert / normal Affect: normal Orientation: oriented to self, place, time and situation Memory and insight: normal, memory for past and recent events intact, and thought process normal      Please see EMR for more detailed significant labs, imaging, consultant notes etc.    IYakov MD, personally saw the patient today and spent greater than 30 minutes discharging this patient.    Yakov Hidalgo MD  [facility]    CC:Lilia Doyle

## 2023-11-02 NOTE — PROVIDER NOTIFICATION
Abe text paged Dr. Alonzo about needing an order for q4h BG checks. Was texted back orders will be added.

## 2023-11-02 NOTE — PLAN OF CARE
"  Problem: Adult Inpatient Plan of Care  Goal: Plan of Care Review  Description: The Plan of Care Review/Shift note should be completed every shift.  The Outcome Evaluation is a brief statement about your assessment that the patient is improving, declining, or no change.  This information will be displayed automatically on your shift  note.  Outcome: Met  Goal: Patient-Specific Goal (Individualized)  Description: You can add care plan individualizations to a care plan. Examples of Individualization might be:  \"Parent requests to be called daily at 9am for status\", \"I have a hard time hearing out of my right ear\", or \"Do not touch me to wake me up as it startles  me\".  Outcome: Met  Goal: Absence of Hospital-Acquired Illness or Injury  Outcome: Met  Intervention: Prevent Skin Injury  Recent Flowsheet Documentation  Taken 11/2/2023 0809 by Kerri Cook RN  Body Position: position changed independently  Goal: Optimal Comfort and Wellbeing  Outcome: Met  Goal: Readiness for Transition of Care  Outcome: Met     Problem: Fall Injury Risk  Goal: Absence of Fall and Fall-Related Injury  Outcome: Met     Problem: Comorbidity Management  Goal: Blood Glucose Levels Within Targeted Range  Outcome: Met     Problem: Risk for Delirium  Goal: Optimal Coping  Outcome: Met  Goal: Improved Behavioral Control  Outcome: Met  Goal: Improved Attention and Thought Clarity  Outcome: Met  Goal: Improved Sleep  Outcome: Met   Goal Outcome Evaluation:               Pt VSS. Pain controlled with ordered medications. Ate half of breakfast and tolerated well. Up independently in room and voiding well. Discharge education given and questions answered.          "

## 2023-11-02 NOTE — PLAN OF CARE
Problem: Fall Injury Risk  Goal: Absence of Fall and Fall-Related Injury  Outcome: Progressing  Intervention: Promote Injury-Free Environment  Recent Flowsheet Documentation  Taken 11/2/2023 0030 by Inez Peter RN  Safety Promotion/Fall Prevention: safety round/check completed     Problem: Comorbidity Management  Goal: Blood Glucose Levels Within Targeted Range  Outcome: Progressing     Problem: Risk for Delirium  Goal: Improved Sleep  Outcome: Progressing   Goal Outcome Evaluation:    VSS except HTN within parameters. Patient does have scheduled BP meds. C/O abdominal pain, tylenol given for pain control. PIV running D5 NS w/ K+  @ 75ml/hr. Q4h BG covered per MAR orders. Full liquid diet. Voiding spontaneously. Will continue to monitor.

## 2023-11-02 NOTE — PROGRESS NOTES
"[facility] Progress Note     SUBJECTIVE:    The patient reports feeling significantly improved since she was admitted. She denies any nausea or further episodes of vomiting since admission. Last bowel movement two days ago. No longer feels like \"there is a rock in her stomach\", and left upper abdominal discomfort/pressure is resolved.     OBJECTIVE:  /80 (BP Location: Right arm, Patient Position: Semi-Penaloza's, Cuff Size: Adult Regular)   Pulse 82   Temp 98.2  F (36.8  C) (Oral)   Resp 16   Ht 1.6 m (5' 3\")   Wt 64 kg (141 lb)   SpO2 98%   BMI 24.98 kg/m    Temp (24hrs), Av.4  F (36.9  C), Min:98.2  F (36.8  C), Max:98.7  F (37.1  C)    Patient Vitals for the past 72 hrs:   Weight   10/30/23 1314 64 kg (141 lb)     No intake or output data in the 24 hours ending 23 1032     PHYSICAL EXAM  GEN: NAD, sitting upright in bed  HRT: appears well perfused  RESP: unlabored  ABD: soft and non-tender  SKIN: Visualized skin intact, no rash    Additional Data:  I have reviewed the patient's new clinical lab results:     Recent Labs   Lab Test 10/30/23  1500 23  1054   WBC 8.7 7.2   HGB 13.4 12.6   MCV 90 89    348     Recent Labs   Lab Test 23  0615 23  0627 10/31/23  0543   POTASSIUM 3.9 3.8 3.5   CHLORIDE 110* 108* 108*   CO2 24 27 28   BUN 5.1* 5.2* 5.5*   ANIONGAP 6* 4* 6*     Recent Labs   Lab Test 10/30/23  1500 23  1230   ALBUMIN 3.5  --    BILITOTAL 0.4  --    ALT 10  --    AST 19  --    PROTEIN  --  Negative   LIPASE 13  --      Imaging results:     CT abdomen pelvis w/ contrast 10/30/2023  FINDINGS:   LOWER CHEST: Slight fibrosis left lower lobe of no significance.     HEPATOBILIARY: Gallbladder removed. Mildly prominent extrahepatic bile ducts likely reservoir effect. Mild diffuse hepatic steatosis.     PANCREAS: Normal.     SPLEEN: Normal.     ADRENAL GLANDS: Normal.     KIDNEYS/BLADDER: There is a 1.8 x 1.2 cm mildly obstructing stone in the renal " pelvis with some generalized thickening of the renal pelvis likely from chronic irritation. No other stones.     BOWEL: Scattered diverticulosis in the colon.     LYMPH NODES: No lymphadenopathy.     VASCULATURE: Moderate calcified plaque. No aneurysms.     PELVIC ORGANS: Postop change.     MUSCULOSKELETAL: Normal.                                                                IMPRESSION:   1.  There is a mildly obstructing stone left renal pelvis measures 1.8 cm with some thickening of the renal pelvis consistent with chronic irritation.  2.  No other significant findings.  3.  Postop change in the stomach.     Gastric emptying study 10/31/2023  FINDINGS: The stomach is scintigraphically normal. No evidence of gastroesophageal reflux.     PATIENT'S RETENTION: 1 hour = 97%, 2 hours = 95%, 4 hours = 98%  NORMAL RETENTION: 1 hour = 30-90%, 2 hours = <60%, 4 hours = <10%                                                               IMPRESSION:   Markedly abnormal study. At 4 hours, no significant gastric emptying was observed.    Procedures:    EGD 11/1/2023  Impression:            - LA Grade B reflux esophagitis with no bleeding.                          - Z-line regular, 40 cm from the incisors.                          - Patent vertical banded gastroplasty with a                          medium-sized pouch and intact staple line and band                          appears tight. Dilated.                          - Erythematous mucosa in the antrum. Biopsied.                          - Normal examined duodenum.   Recommendation:        - Return patient to hospital [facility] for ongoing care.                          - Await pathology results.                          - Based upon the nuclear medicine gastric emptying                          scan done yesterday, it appears that she has severe                          gastroparesis. While the Lap-Band is a bit tight, this                          area was dilated, and there  does not appear to be any                          significant gastric outlet obstruction to explain her                          persistent nausea and vomiting.                          - Would recommend treatment of gastroparesis, with                          better diabetic control, as well as consideration for                          medication such as Reglan, starting this 5 mg 3 times                          a day with meals. Other medication such as                          erythromycin may also need to be considered for the                          long-term.                          - If these above-mentioned medications are unhelpful                          in improving her symptoms, then as an outpatient,                          consideration may need to be given for her to be                          evaluated by the surgery team, for takedown of the                          Lap-Band.                          - Another option, may be consideration for Enterra                          neuro gastric stimulator placement, for her severe                          gastroparesis, though again, this would be complicated                          by her pre-existing Lap-band. This can be discussed as                          an outpatient.                          - She will benefit from smaller, more frequent meals,                          rather than 3 regular meals a day. She will benefit                          from food that is more in the terms of liquid,                          smoothie, soup or soft consistency. Please get a                          dietician consultation during this hospitalization to                          discuss a gastroparetic diet with her.                          - Please continue PPI therapy with omeprazole 20 mg po                          twice a day      IMPRESSION:  Dotty Villanueva is a 73-year-old female with a history of type 2 diabetes, CKD stage III,  hyperlipidemia, osteopenia, hypertension, and history of bariatric surgery (lap band surgery done in 2000), as well as cholecystectomy, appendectomy, and hysterectomy.  The patient was sent to the emergency department 10/30 after an attempted EGD that revealed significant amounts of solid food and liquid in the stomach, despite being n.p.o. for several days prior.  Differential includes gastroparesis, gastric outlet obstruction such as pyloric stenosis or malignancy, among others.  CT abdomen and pelvis was done during this admission was unrevealing. Gastric emptying study was grossly abnormal, with 98% retention at 4 hours, increasing concern for gastroparesis.  EGD done yesterday showing esophagitis, intact VBG with a tight appearing band that was dilated, and gastric erythema. Remainder of exam was normal. Surgical path is pending. Given these results, we recommend starting reglan 5 mg TID, which can be continued until follow up with [facility] as scheduled on 12/7. Discussed the risk of tardive dyskinesia, and the patient verbalized understanding and would like to proceed with the medication. Also encouraged gastroparesis dietary recommendations, and dietician consulted during admission. The patient is not up to date on her colonoscopy, but would prefer to continue screening through her primary with stool testing. The patient is feeling significantly improved since being admitted, and from GI standpoint is ready for discharge to home.      PLAN:  - Pantoprazole 40 mg BID  - Ok to stop sucralfate  - Reglan 5 mg TID, discussed risk of TD  - Gastroparesis dietary recommendations  - Follow up with [facility] as scheduled 12/7    (Dr. Leon)  Mariela Bertrand PA-C  [facility]  11/2/2023 10:32 AM  [phone] (office)    25 minutes of total time was spent providing patient care, including patient evaluation, reviewing documentation/test results, , and  documentation.  ________________________________________________________________________

## 2024-02-20 NOTE — H&P (VIEW-ONLY)
[facility]      Preoperative Consultation   Dotty Villanueva   : 1950   Gender: female    Date of Encounter: 2024    Nursing Notes:   Juju Ceja  2024  1:32 PM  Addendum  Dotty Villanueva is a 73 y.o. female (1950) who presents for preop evaluation undergoing kidney stone removal    Date of Surgery: 3/11/24  Surgeon: Dr. Quinn  Surgical Group Name: [facility]  Surgical Specialty: Urology  Kevin Quinn MD - [facility]  Surgery type: outpatient  Primary Physician: Lilia Doyle MD       Tetanus up to date yes, 2017  Latex Allergy: no  Anesthesia Complications: Yes, personal hx sensitivity to anesthesia. O2 levels get low.  History of abnormal bleeding : None  History of Blood Transfusions: No  Health Care Directive or Living Will: yes    Juju Ceja CMA (AAMA) .........2024 1:13 PM        Juju Ceja  2024  1:16 PM  Signed  Chief Complaint   Patient presents with     Preoperative Exam     PRE OP DOS: 24- [facility]- KIDNEY STONE REMOVAL- DR QUINN WITH [facility]       Additional visit information (chief complaint/health maintenance) shared by patient:       Health Maintenance Due   Topic Date Due     Zoster (shingles) series for age 50+ (3 of 3) 2019     Fecal testing non-DNA (FIT,FOBT,iFOBT) for age 45-75  2023       Health maintenance reviewed with patient Yes    Patient presents for an in-person office visit: alone  Communication Method: Patient is not active on [employer] and is aware they will receive their results/communications via phone call  If a phone call is needed, the preferred number is:  Mobile. Either.  Home Phone [phone]   Mobile [phone]     May we leave a detailed message at this number? Yes on either.      Juju Ceja CMA (AAMA) .........2024 1:15 PM           History of Present Illness   Reports one large kidney stone  on the left that needs to be surgically removed      Review of Systems   General: Denies general constitutional problems  Eyes: Denies problems  Ears/Nose/Throat: Denies problems  Cardiovascular: Denies problems  Respiratory: Denies problems  Gastrointestinal: Denies problems  Musculoskeletal: Denies problems  Skin: Denies problems  Neurologic: Denies problems  Psychiatric: Denies problems  Endocrine: Denies problems, excellent HgbA1c   Heme/Lymphatic: Denies problems    Patient Active Problem List   Diagnosis Code     Pure hypercholesterolemia E78.00     Toxic multinodular goiter without mention of thyrotoxic crisis or storm E05.20     Reflux [id number]     Type 2 diabetes mellitus with microalbuminuria, without long-term current use of insulin (HC) E11.29, R80.9     HTN (hypertension) I10     Vitamin D deficiency E55.9     Pulmonary nodules R91.8     Osteopenia M85.80     Renal calculi N20.0     Hydronephrosis of left kidney N13.30     Skin cancer C44.90     Kidney stones N20.0     Current Outpatient Medications   Medication Sig     amLODIPine (NORVASC) 5 mg tablet Take 1 Tablet (5 mg) by mouth once daily.     aspirin (ECOTRIN) 81 mg enteric coated tablet Take 1 tablet by mouth once daily with a meal.     atorvastatin (LIPITOR) 20 mg tablet Take 3 Tablets (60 mg) by mouth at bedtime.     atorvastatin (LIPITOR) 20 mg tablet Take 1 Tablet (20 mg) by mouth at bedtime. To be taken with the 40 mg pill     Benazepril HCl 40 mg tablet Take 1 Tablet (40 mg) by mouth once daily.     blood sugar diagnostic (Blood Glucose Test) strip Test 2 times per day. Per glucometer     calcium carbonate (CALTRATE) 600 mg calcium (1,500 mg) tablet Take 1 Tablet (600 mg) by mouth once daily with a meal.     Cinnamon Bark 500 mg capsule Take 1 Capsule (500 mg) by mouth once daily.     cyanocobalamin (VITAMIN B12) 1,000 mcg/mL injection INJECT 1 ML INTRAMUSCULARLY ONCE WEEKLY     erythromycin ophthalmic ointment 0.5% Apply 1 Strip to both eyes  6 times daily.     fluticasone (50 mcg per actuation) nasal solution (FLONASE) Inhale 2 Sprays to both nostrils once daily.     glimepiride (AMARYL) 2 mg tablet Take 1 Tablet (2 mg) by mouth once daily with a meal.     LORazepam (ATIVAN) 2 mg tab TAKE 1 TABLET (2 MG) BY MOUTH EVERY 6 HOURS IF NEEDED FOR ANXIETY (ANXIETY).     medication order composer CO-Q 10 takes this daily     medication order composer [place] [facility] 24 hour pill. Takes daily.     metoclopramide (REGLAN) 5 mg tablet Take 5 mg by mouth.     multivitamin (MVI) tablet Take 1 tablet by mouth once daily.     ondansetron (ZOFRAN ODT) 4 mg disintegrating tablet Place 1 Tablet (4 mg) on the tongue every 8 hours if needed for Nausea/Vomiting.     pantoprazole (PROTONIX) 40 mg delayed-release tablet Take 1 Tablet (40 mg) by mouth three times daily before meals.     sucralfate (CARAFATE) 100 mg/mL suspension Take 10 mL (1,000 mg) by mouth four times daily before meals and at bedtime.     traZODone (DESYREL) 100 mg tablet Take 1 Tablet (100 mg) by mouth at bedtime.     No current facility-administered medications for this visit.     Medications have been reviewed by me and are current to the best of my knowledge and ability.     Allergies   Allergen Reactions     Vasotec [Enalapril] Cough     Enalaprilat Cough     Unknown-Follow Up Needed (Include Details In Comments) *Unknown     States it is a anesthesia       Morphine Edema, Hives and Rash     And itching     Penicillins Vomiting, Nausea And Vomiting and Rash     Past Surgical History:   . Laterality Date     (IA) VA GASTROPLASTY OBESITY VERT BAND  7/6/00     APPENDECTOMY  7/6/00     BIOPSY THYROID       CHOLECYSTECTOMY  7/6/00     COLPOSCOPY  9/12    JAMI III     CYSTO-LEFT LASER, LEFT URETEROSCOPY LITHOTRIPSY AND LEFT STENT  04/26/2017     ESOPHAGOGASTRODUODENOSCOPY  3/20/2013     HAND CONTRACTURE RELEASE Left 06/2020     HYSTERECTOMY  1/2013    squamous cell carcinoma of cervix      LAPAROSCOPIC  CONVERTED TO OPEN ABDOMINAL HYSTERECTOMY WITH TRANSVERSE COLON REPAIR  1/11/2013    bowel laceration      LEEP PROCEDURE  9/1/11    JAMI II-III, positive margins     TUBAL LIGATION       Social History     Tobacco Use     Smoking status: Never     Smokeless tobacco: Never   Vaping Use     Vaping Use: Never used   Substance Use Topics     Alcohol use: Yes     Comment: 0-1/month     Drug use: No     Family History   Problem Relation Age of Onset     Diabetes Mother         had shingles age 91, now 94     Heart Disease Father      Heart Disease Brother      Cancer-breast No Family History      Cancer-ovarian No Family History        PAST DIFFICULTY WITH ANESTHESIA: Yes, personal hx with sodium pentothal gets hypoxemia       Physical Exam   /82 (Cuff Site: Right Arm, Position: Sitting, Cuff Size: Adult Regular)   Pulse 91   Temp 98  F (36.7  C) (Oral)   Wt 61.7 kg (136 lb)   SpO2 99%   Breastfeeding No   BMI 24.09 kg/m   Body mass index is 24.09 kg/m .  General Appearance: Normal.  Head Exam: Normal. Normocephalic, atraumatic.  Eye Exam: Normal.  Ear Exam: Normal.  Nose Exam: Normal.  OroPharynx Exam: Normal.  Neck Exam: Normal.  Thyroid Exam: Normal.  Chest/Respiratory Exam: Normal.  Cardiovascular Exam: Normal.  Gastrointestinal Exam: soft , active bowel sounds, a little tender to deep palpation   Lymphatic Exam: Normal.  Musculoskeletal Exam: Normal.  Foot Exam: Normal.  Skin: Normal.  Neurologic Exam: Normal.  Psychiatric Exam: Normal.     Assessment / Plan     Labs: yes  Potassium 2.9 ( 2/20/2024 ) oral replacement ordered   Creatinine 0.9/eGFR 68  Hemoglobin =13.7  HgbA1c 5.8  Glucose 69   ECG: no    ICD-10-CM    1. Kidney stones  N20.0       2. Preop cardiovascular exam  Z01.810 POTASSIUM,ISTAT     CREATININE,ISTAT     HEMOGLOBIN      3. Type 2 diabetes mellitus with microalbuminuria, without long-term current use of insulin (HC)  E11.29 HEMOGLOBIN A1C MONITORING (POCT)    R80.9 GLUCOSE, RANDOM      4.  Hyperlipidemia, unspecified hyperlipidemia type  E78.5 atorvastatin (LIPITOR) 20 mg tablet      5. Eye drainage  H57.89       6. Anxiety  F41.9       7. Gastroparesis  K31.84         Patient is cleared for planned procedure. Pending follow up serum potassium after starting oral replacemnt   Electronically Signed by:   Lilia Doyle MD ............. 2/20/2024 2:05 PM   2/20/2024

## 2024-03-08 ENCOUNTER — ANESTHESIA EVENT (OUTPATIENT)
Dept: SURGERY | Facility: HOSPITAL | Age: 74
DRG: 327 | End: 2024-03-08
Payer: MEDICARE

## 2024-03-08 RX ORDER — FLUTICASONE PROPIONATE 50 MCG
1 SPRAY, SUSPENSION (ML) NASAL DAILY PRN
COMMUNITY

## 2024-03-08 RX ORDER — ERYTHROMYCIN 5 MG/G
1 OINTMENT OPHTHALMIC PRN
COMMUNITY

## 2024-03-08 RX ORDER — AMPICILLIN TRIHYDRATE 250 MG
1 CAPSULE ORAL DAILY
COMMUNITY

## 2024-03-08 RX ORDER — LORAZEPAM 2 MG/1
2 TABLET ORAL EVERY 6 HOURS PRN
COMMUNITY

## 2024-03-11 ENCOUNTER — HOSPITAL ENCOUNTER (OUTPATIENT)
Dept: INTERVENTIONAL RADIOLOGY/VASCULAR | Facility: HOSPITAL | Age: 74
Discharge: HOME | DRG: 327 | End: 2024-03-11
Attending: UROLOGY | Admitting: UROLOGY
Payer: MEDICARE

## 2024-03-11 ENCOUNTER — HOSPITAL ENCOUNTER (INPATIENT)
Facility: HOSPITAL | Age: 74
LOS: 2 days | Discharge: HOME | DRG: 327 | End: 2024-03-14
Attending: UROLOGY | Admitting: SURGERY
Payer: MEDICARE

## 2024-03-11 ENCOUNTER — ANESTHESIA (OUTPATIENT)
Dept: SURGERY | Facility: HOSPITAL | Age: 74
DRG: 327 | End: 2024-03-11
Payer: MEDICARE

## 2024-03-11 ENCOUNTER — APPOINTMENT (OUTPATIENT)
Dept: RADIOLOGY | Facility: HOSPITAL | Age: 74
DRG: 327 | End: 2024-03-11
Attending: UROLOGY
Payer: MEDICARE

## 2024-03-11 DIAGNOSIS — N20.0 KIDNEY STONE: ICD-10-CM

## 2024-03-11 DIAGNOSIS — K31.1 GASTRIC OUTLET OBSTRUCTION: Primary | ICD-10-CM

## 2024-03-11 DIAGNOSIS — N20.0 CALCULUS OF KIDNEY: ICD-10-CM

## 2024-03-11 DIAGNOSIS — N30.00 ACUTE CYSTITIS WITHOUT HEMATURIA: ICD-10-CM

## 2024-03-11 LAB
ABO/RH(D): NORMAL
ANTIBODY SCREEN: NEGATIVE
GLUCOSE BLDC GLUCOMTR-MCNC: 132 MG/DL (ref 70–99)
GLUCOSE BLDC GLUCOMTR-MCNC: 269 MG/DL (ref 70–99)
GLUCOSE BLDC GLUCOMTR-MCNC: 79 MG/DL (ref 70–99)
GLUCOSE BLDC GLUCOMTR-MCNC: 88 MG/DL (ref 70–99)
HOLD SPECIMEN: NORMAL
HOLD SPECIMEN: NORMAL
SPECIMEN EXPIRATION DATE: NORMAL

## 2024-03-11 PROCEDURE — 370N000017 HC ANESTHESIA TECHNICAL FEE, PER MIN: Performed by: UROLOGY

## 2024-03-11 PROCEDURE — 272N000001 HC OR GENERAL SUPPLY STERILE: Performed by: UROLOGY

## 2024-03-11 PROCEDURE — 82962 GLUCOSE BLOOD TEST: CPT

## 2024-03-11 PROCEDURE — 250N000012 HC RX MED GY IP 250 OP 636 PS 637: Performed by: STUDENT IN AN ORGANIZED HEALTH CARE EDUCATION/TRAINING PROGRAM

## 2024-03-11 PROCEDURE — 258N000003 HC RX IP 258 OP 636: Performed by: UROLOGY

## 2024-03-11 PROCEDURE — 258N000003 HC RX IP 258 OP 636: Performed by: ANESTHESIOLOGY

## 2024-03-11 PROCEDURE — C2617 STENT, NON-COR, TEM W/O DEL: HCPCS | Performed by: UROLOGY

## 2024-03-11 PROCEDURE — 250N000025 HC SEVOFLURANE, PER MIN: Performed by: UROLOGY

## 2024-03-11 PROCEDURE — 999N000141 HC STATISTIC PRE-PROCEDURE NURSING ASSESSMENT: Performed by: UROLOGY

## 2024-03-11 PROCEDURE — 272N000004 HC RX 272: Performed by: UROLOGY

## 2024-03-11 PROCEDURE — 250N000009 HC RX 250: Performed by: NURSE ANESTHETIST, CERTIFIED REGISTERED

## 2024-03-11 PROCEDURE — 250N000011 HC RX IP 250 OP 636: Performed by: NURSE ANESTHETIST, CERTIFIED REGISTERED

## 2024-03-11 PROCEDURE — 999N000083 IR NEPHROLITHOTOMY

## 2024-03-11 PROCEDURE — 0T778DZ DILATION OF LEFT URETER WITH INTRALUMINAL DEVICE, VIA NATURAL OR ARTIFICIAL OPENING ENDOSCOPIC: ICD-10-PCS | Performed by: RADIOLOGY

## 2024-03-11 PROCEDURE — C1769 GUIDE WIRE: HCPCS | Performed by: UROLOGY

## 2024-03-11 PROCEDURE — 250N000013 HC RX MED GY IP 250 OP 250 PS 637: Performed by: ANESTHESIOLOGY

## 2024-03-11 PROCEDURE — 258N000001 HC RX 258: Performed by: UROLOGY

## 2024-03-11 PROCEDURE — 710N000009 HC RECOVERY PHASE 1, LEVEL 1, PER MIN: Performed by: UROLOGY

## 2024-03-11 PROCEDURE — C1894 INTRO/SHEATH, NON-LASER: HCPCS | Performed by: UROLOGY

## 2024-03-11 PROCEDURE — 258N000003 HC RX IP 258 OP 636: Performed by: NURSE ANESTHETIST, CERTIFIED REGISTERED

## 2024-03-11 PROCEDURE — C1725 CATH, TRANSLUMIN NON-LASER: HCPCS | Performed by: UROLOGY

## 2024-03-11 PROCEDURE — 999N000182 XR SURGERY CARM FLUORO GREATER THAN 5 MIN

## 2024-03-11 PROCEDURE — 0TC14ZZ EXTIRPATION OF MATTER FROM LEFT KIDNEY, PERCUTANEOUS ENDOSCOPIC APPROACH: ICD-10-PCS | Performed by: UROLOGY

## 2024-03-11 PROCEDURE — 82365 CALCULUS SPECTROSCOPY: CPT | Performed by: UROLOGY

## 2024-03-11 PROCEDURE — C1887 CATHETER, GUIDING: HCPCS | Performed by: UROLOGY

## 2024-03-11 PROCEDURE — 86900 BLOOD TYPING SEROLOGIC ABO: CPT | Performed by: NURSE PRACTITIONER

## 2024-03-11 PROCEDURE — 250N000011 HC RX IP 250 OP 636: Performed by: NURSE PRACTITIONER

## 2024-03-11 PROCEDURE — C1726 CATH, BAL DIL, NON-VASCULAR: HCPCS | Performed by: UROLOGY

## 2024-03-11 PROCEDURE — 255N000002 HC RX 255 OP 636: Performed by: UROLOGY

## 2024-03-11 PROCEDURE — 36415 COLL VENOUS BLD VENIPUNCTURE: CPT | Performed by: NURSE PRACTITIONER

## 2024-03-11 PROCEDURE — 250N000013 HC RX MED GY IP 250 OP 250 PS 637: Performed by: UROLOGY

## 2024-03-11 PROCEDURE — 360N000084 HC SURGERY LEVEL 4 W/ FLUORO, PER MIN: Performed by: UROLOGY

## 2024-03-11 PROCEDURE — 99222 1ST HOSP IP/OBS MODERATE 55: CPT | Performed by: STUDENT IN AN ORGANIZED HEALTH CARE EDUCATION/TRAINING PROGRAM

## 2024-03-11 DEVICE — IMPLANTABLE DEVICE: Type: IMPLANTABLE DEVICE | Site: URETER | Status: FUNCTIONAL

## 2024-03-11 RX ORDER — HALOPERIDOL 5 MG/ML
1 INJECTION INTRAMUSCULAR
Status: DISCONTINUED | OUTPATIENT
Start: 2024-03-11 | End: 2024-03-11 | Stop reason: HOSPADM

## 2024-03-11 RX ORDER — HYDROMORPHONE HCL IN WATER/PF 6 MG/30 ML
0.2 PATIENT CONTROLLED ANALGESIA SYRINGE INTRAVENOUS
Status: DISCONTINUED | OUTPATIENT
Start: 2024-03-11 | End: 2024-03-13

## 2024-03-11 RX ORDER — FENTANYL CITRATE 50 UG/ML
25 INJECTION, SOLUTION INTRAMUSCULAR; INTRAVENOUS EVERY 5 MIN PRN
Status: DISCONTINUED | OUTPATIENT
Start: 2024-03-11 | End: 2024-03-11 | Stop reason: HOSPADM

## 2024-03-11 RX ORDER — FENTANYL CITRATE 50 UG/ML
25 INJECTION, SOLUTION INTRAMUSCULAR; INTRAVENOUS
Status: CANCELLED | OUTPATIENT
Start: 2024-03-11

## 2024-03-11 RX ORDER — POLYETHYLENE GLYCOL 3350 17 G/17G
17 POWDER, FOR SOLUTION ORAL DAILY
Status: DISCONTINUED | OUTPATIENT
Start: 2024-03-12 | End: 2024-03-13

## 2024-03-11 RX ORDER — ONDANSETRON 2 MG/ML
INJECTION INTRAMUSCULAR; INTRAVENOUS PRN
Status: DISCONTINUED | OUTPATIENT
Start: 2024-03-11 | End: 2024-03-11

## 2024-03-11 RX ORDER — OXYCODONE HYDROCHLORIDE 5 MG/1
5 TABLET ORAL
Status: CANCELLED | OUTPATIENT
Start: 2024-03-11

## 2024-03-11 RX ORDER — ONDANSETRON 2 MG/ML
4 INJECTION INTRAMUSCULAR; INTRAVENOUS EVERY 6 HOURS PRN
Status: DISCONTINUED | OUTPATIENT
Start: 2024-03-11 | End: 2024-03-13

## 2024-03-11 RX ORDER — DEXTROSE MONOHYDRATE 25 G/50ML
25-50 INJECTION, SOLUTION INTRAVENOUS
Status: DISCONTINUED | OUTPATIENT
Start: 2024-03-11 | End: 2024-03-14 | Stop reason: HOSPADM

## 2024-03-11 RX ORDER — ONDANSETRON 4 MG/1
4 TABLET, ORALLY DISINTEGRATING ORAL EVERY 30 MIN PRN
Status: CANCELLED | OUTPATIENT
Start: 2024-03-11

## 2024-03-11 RX ORDER — NICOTINE POLACRILEX 4 MG
15-30 LOZENGE BUCCAL
Status: DISCONTINUED | OUTPATIENT
Start: 2024-03-11 | End: 2024-03-14 | Stop reason: HOSPADM

## 2024-03-11 RX ORDER — CEFAZOLIN SODIUM/WATER 2 G/20 ML
2 SYRINGE (ML) INTRAVENOUS
Status: COMPLETED | OUTPATIENT
Start: 2024-03-11 | End: 2024-03-11

## 2024-03-11 RX ORDER — OXYCODONE HYDROCHLORIDE 5 MG/1
10 TABLET ORAL
Status: CANCELLED | OUTPATIENT
Start: 2024-03-11

## 2024-03-11 RX ORDER — SODIUM CHLORIDE, SODIUM LACTATE, POTASSIUM CHLORIDE, CALCIUM CHLORIDE 600; 310; 30; 20 MG/100ML; MG/100ML; MG/100ML; MG/100ML
INJECTION, SOLUTION INTRAVENOUS CONTINUOUS
Status: DISCONTINUED | OUTPATIENT
Start: 2024-03-11 | End: 2024-03-11 | Stop reason: HOSPADM

## 2024-03-11 RX ORDER — ONDANSETRON 4 MG/1
4 TABLET, ORALLY DISINTEGRATING ORAL EVERY 6 HOURS PRN
Status: DISCONTINUED | OUTPATIENT
Start: 2024-03-11 | End: 2024-03-11

## 2024-03-11 RX ORDER — FENTANYL CITRATE 50 UG/ML
50 INJECTION, SOLUTION INTRAMUSCULAR; INTRAVENOUS EVERY 5 MIN PRN
Status: DISCONTINUED | OUTPATIENT
Start: 2024-03-11 | End: 2024-03-11 | Stop reason: HOSPADM

## 2024-03-11 RX ORDER — NALOXONE HYDROCHLORIDE 0.4 MG/ML
0.2 INJECTION, SOLUTION INTRAMUSCULAR; INTRAVENOUS; SUBCUTANEOUS
Status: DISCONTINUED | OUTPATIENT
Start: 2024-03-11 | End: 2024-03-14 | Stop reason: HOSPADM

## 2024-03-11 RX ORDER — POTASSIUM CHLORIDE 1.5 G/1.58G
20 POWDER, FOR SOLUTION ORAL 2 TIMES DAILY
COMMUNITY

## 2024-03-11 RX ORDER — NALOXONE HYDROCHLORIDE 0.4 MG/ML
0.1 INJECTION, SOLUTION INTRAMUSCULAR; INTRAVENOUS; SUBCUTANEOUS
Status: CANCELLED | OUTPATIENT
Start: 2024-03-11

## 2024-03-11 RX ORDER — LIDOCAINE 40 MG/G
CREAM TOPICAL
Status: DISCONTINUED | OUTPATIENT
Start: 2024-03-11 | End: 2024-03-11 | Stop reason: HOSPADM

## 2024-03-11 RX ORDER — ONDANSETRON 4 MG/1
4 TABLET, ORALLY DISINTEGRATING ORAL EVERY 30 MIN PRN
Status: DISCONTINUED | OUTPATIENT
Start: 2024-03-11 | End: 2024-03-11 | Stop reason: HOSPADM

## 2024-03-11 RX ORDER — AMLODIPINE BESYLATE 5 MG/1
5 TABLET ORAL DAILY
Status: DISCONTINUED | OUTPATIENT
Start: 2024-03-11 | End: 2024-03-14 | Stop reason: HOSPADM

## 2024-03-11 RX ORDER — AMOXICILLIN 250 MG
1 CAPSULE ORAL 2 TIMES DAILY
Status: DISCONTINUED | OUTPATIENT
Start: 2024-03-11 | End: 2024-03-13

## 2024-03-11 RX ORDER — PANTOPRAZOLE SODIUM 40 MG/1
40 TABLET, DELAYED RELEASE ORAL
Status: DISCONTINUED | OUTPATIENT
Start: 2024-03-11 | End: 2024-03-14 | Stop reason: HOSPADM

## 2024-03-11 RX ORDER — ACETAMINOPHEN 325 MG/1
650 TABLET ORAL EVERY 4 HOURS PRN
Status: DISCONTINUED | OUTPATIENT
Start: 2024-03-14 | End: 2024-03-13

## 2024-03-11 RX ORDER — LORAZEPAM 1 MG/1
2 TABLET ORAL EVERY 6 HOURS PRN
Status: DISCONTINUED | OUTPATIENT
Start: 2024-03-11 | End: 2024-03-14 | Stop reason: HOSPADM

## 2024-03-11 RX ORDER — ONDANSETRON 2 MG/ML
4 INJECTION INTRAMUSCULAR; INTRAVENOUS EVERY 30 MIN PRN
Status: DISCONTINUED | OUTPATIENT
Start: 2024-03-11 | End: 2024-03-11 | Stop reason: HOSPADM

## 2024-03-11 RX ORDER — FENTANYL CITRATE 50 UG/ML
INJECTION, SOLUTION INTRAMUSCULAR; INTRAVENOUS PRN
Status: DISCONTINUED | OUTPATIENT
Start: 2024-03-11 | End: 2024-03-11

## 2024-03-11 RX ORDER — PROCHLORPERAZINE MALEATE 5 MG
5 TABLET ORAL EVERY 6 HOURS PRN
Status: DISCONTINUED | OUTPATIENT
Start: 2024-03-11 | End: 2024-03-13

## 2024-03-11 RX ORDER — NALOXONE HYDROCHLORIDE 0.4 MG/ML
0.1 INJECTION, SOLUTION INTRAMUSCULAR; INTRAVENOUS; SUBCUTANEOUS
Status: DISCONTINUED | OUTPATIENT
Start: 2024-03-11 | End: 2024-03-11 | Stop reason: HOSPADM

## 2024-03-11 RX ORDER — ACETAMINOPHEN 325 MG/1
975 TABLET ORAL ONCE
Status: COMPLETED | OUTPATIENT
Start: 2024-03-11 | End: 2024-03-11

## 2024-03-11 RX ORDER — BISACODYL 10 MG
10 SUPPOSITORY, RECTAL RECTAL DAILY PRN
Status: DISCONTINUED | OUTPATIENT
Start: 2024-03-14 | End: 2024-03-13

## 2024-03-11 RX ORDER — HYDROMORPHONE HCL IN WATER/PF 6 MG/30 ML
0.4 PATIENT CONTROLLED ANALGESIA SYRINGE INTRAVENOUS
Status: DISCONTINUED | OUTPATIENT
Start: 2024-03-11 | End: 2024-03-13

## 2024-03-11 RX ORDER — LIDOCAINE HYDROCHLORIDE 10 MG/ML
INJECTION, SOLUTION INFILTRATION; PERINEURAL PRN
Status: DISCONTINUED | OUTPATIENT
Start: 2024-03-11 | End: 2024-03-11

## 2024-03-11 RX ORDER — GLYCOPYRROLATE 0.2 MG/ML
INJECTION, SOLUTION INTRAMUSCULAR; INTRAVENOUS PRN
Status: DISCONTINUED | OUTPATIENT
Start: 2024-03-11 | End: 2024-03-11

## 2024-03-11 RX ORDER — PROPOFOL 10 MG/ML
INJECTION, EMULSION INTRAVENOUS PRN
Status: DISCONTINUED | OUTPATIENT
Start: 2024-03-11 | End: 2024-03-11

## 2024-03-11 RX ORDER — SUCRALFATE ORAL 1 G/10ML
1 SUSPENSION ORAL
Status: DISCONTINUED | OUTPATIENT
Start: 2024-03-11 | End: 2024-03-14 | Stop reason: HOSPADM

## 2024-03-11 RX ORDER — LIDOCAINE 40 MG/G
CREAM TOPICAL
Status: DISCONTINUED | OUTPATIENT
Start: 2024-03-11 | End: 2024-03-13

## 2024-03-11 RX ORDER — ACETAMINOPHEN 325 MG/1
975 TABLET ORAL EVERY 8 HOURS
Qty: 27 TABLET | Refills: 0 | Status: DISCONTINUED | OUTPATIENT
Start: 2024-03-11 | End: 2024-03-13

## 2024-03-11 RX ORDER — HYDROMORPHONE HYDROCHLORIDE 1 MG/ML
0.2 INJECTION, SOLUTION INTRAMUSCULAR; INTRAVENOUS; SUBCUTANEOUS EVERY 5 MIN PRN
Status: DISCONTINUED | OUTPATIENT
Start: 2024-03-11 | End: 2024-03-11 | Stop reason: HOSPADM

## 2024-03-11 RX ORDER — NALOXONE HYDROCHLORIDE 0.4 MG/ML
0.4 INJECTION, SOLUTION INTRAMUSCULAR; INTRAVENOUS; SUBCUTANEOUS
Status: DISCONTINUED | OUTPATIENT
Start: 2024-03-11 | End: 2024-03-14 | Stop reason: HOSPADM

## 2024-03-11 RX ORDER — CEFAZOLIN SODIUM/WATER 2 G/20 ML
2 SYRINGE (ML) INTRAVENOUS SEE ADMIN INSTRUCTIONS
Status: DISCONTINUED | OUTPATIENT
Start: 2024-03-11 | End: 2024-03-11 | Stop reason: HOSPADM

## 2024-03-11 RX ORDER — ONDANSETRON 2 MG/ML
4 INJECTION INTRAMUSCULAR; INTRAVENOUS EVERY 30 MIN PRN
Status: CANCELLED | OUTPATIENT
Start: 2024-03-11

## 2024-03-11 RX ORDER — SODIUM CHLORIDE 9 MG/ML
INJECTION, SOLUTION INTRAVENOUS CONTINUOUS
Status: DISCONTINUED | OUTPATIENT
Start: 2024-03-11 | End: 2024-03-13

## 2024-03-11 RX ORDER — OXYCODONE HYDROCHLORIDE 5 MG/1
10 TABLET ORAL EVERY 4 HOURS PRN
Status: DISCONTINUED | OUTPATIENT
Start: 2024-03-11 | End: 2024-03-13

## 2024-03-11 RX ORDER — KETOROLAC TROMETHAMINE 30 MG/ML
INJECTION, SOLUTION INTRAMUSCULAR; INTRAVENOUS PRN
Status: DISCONTINUED | OUTPATIENT
Start: 2024-03-11 | End: 2024-03-11

## 2024-03-11 RX ORDER — HYDROMORPHONE HYDROCHLORIDE 1 MG/ML
0.4 INJECTION, SOLUTION INTRAMUSCULAR; INTRAVENOUS; SUBCUTANEOUS EVERY 5 MIN PRN
Status: DISCONTINUED | OUTPATIENT
Start: 2024-03-11 | End: 2024-03-11 | Stop reason: HOSPADM

## 2024-03-11 RX ORDER — ONDANSETRON 4 MG/1
4 TABLET, ORALLY DISINTEGRATING ORAL EVERY 6 HOURS PRN
Status: DISCONTINUED | OUTPATIENT
Start: 2024-03-11 | End: 2024-03-13

## 2024-03-11 RX ORDER — PROPOFOL 10 MG/ML
INJECTION, EMULSION INTRAVENOUS CONTINUOUS PRN
Status: DISCONTINUED | OUTPATIENT
Start: 2024-03-11 | End: 2024-03-11

## 2024-03-11 RX ORDER — OXYCODONE HYDROCHLORIDE 5 MG/1
5 TABLET ORAL EVERY 4 HOURS PRN
Status: DISCONTINUED | OUTPATIENT
Start: 2024-03-11 | End: 2024-03-13

## 2024-03-11 RX ORDER — KETAMINE HYDROCHLORIDE 10 MG/ML
INJECTION INTRAMUSCULAR; INTRAVENOUS PRN
Status: DISCONTINUED | OUTPATIENT
Start: 2024-03-11 | End: 2024-03-11

## 2024-03-11 RX ORDER — DEXAMETHASONE SODIUM PHOSPHATE 10 MG/ML
INJECTION, SOLUTION INTRAMUSCULAR; INTRAVENOUS PRN
Status: DISCONTINUED | OUTPATIENT
Start: 2024-03-11 | End: 2024-03-11

## 2024-03-11 RX ORDER — HALOPERIDOL 5 MG/ML
1 INJECTION INTRAMUSCULAR
Status: CANCELLED | OUTPATIENT
Start: 2024-03-11

## 2024-03-11 RX ORDER — FLUTICASONE PROPIONATE 50 MCG
1 SPRAY, SUSPENSION (ML) NASAL DAILY PRN
Status: DISCONTINUED | OUTPATIENT
Start: 2024-03-11 | End: 2024-03-14 | Stop reason: HOSPADM

## 2024-03-11 RX ORDER — TRAZODONE HYDROCHLORIDE 50 MG/1
100 TABLET, FILM COATED ORAL
Status: DISCONTINUED | OUTPATIENT
Start: 2024-03-11 | End: 2024-03-14 | Stop reason: HOSPADM

## 2024-03-11 RX ORDER — LISINOPRIL 20 MG/1
20 TABLET ORAL DAILY
Status: DISCONTINUED | OUTPATIENT
Start: 2024-03-11 | End: 2024-03-14 | Stop reason: HOSPADM

## 2024-03-11 RX ADMIN — AMLODIPINE BESYLATE 5 MG: 5 TABLET ORAL at 15:00

## 2024-03-11 RX ADMIN — PANTOPRAZOLE SODIUM 40 MG: 40 TABLET, DELAYED RELEASE ORAL at 15:34

## 2024-03-11 RX ADMIN — INSULIN ASPART 2 UNITS: 100 INJECTION, SOLUTION INTRAVENOUS; SUBCUTANEOUS at 22:21

## 2024-03-11 RX ADMIN — DOCUSATE SODIUM 50 MG AND SENNOSIDES 8.6 MG 1 TABLET: 8.6; 5 TABLET, FILM COATED ORAL at 12:01

## 2024-03-11 RX ADMIN — SUGAMMADEX 120 MG: 100 INJECTION, SOLUTION INTRAVENOUS at 09:47

## 2024-03-11 RX ADMIN — ACETAMINOPHEN 975 MG: 325 TABLET ORAL at 14:24

## 2024-03-11 RX ADMIN — ATORVASTATIN CALCIUM 60 MG: 40 TABLET, FILM COATED ORAL at 20:03

## 2024-03-11 RX ADMIN — ACETAMINOPHEN 975 MG: 325 TABLET ORAL at 06:48

## 2024-03-11 RX ADMIN — ACETAMINOPHEN 975 MG: 325 TABLET ORAL at 21:57

## 2024-03-11 RX ADMIN — FENTANYL CITRATE 75 MCG: 50 INJECTION INTRAMUSCULAR; INTRAVENOUS at 07:43

## 2024-03-11 RX ADMIN — PHENYLEPHRINE HYDROCHLORIDE 100 MCG: 10 INJECTION INTRAVENOUS at 09:29

## 2024-03-11 RX ADMIN — PROPOFOL 160 MG: 10 INJECTION, EMULSION INTRAVENOUS at 07:43

## 2024-03-11 RX ADMIN — PROPOFOL 30 MCG/KG/MIN: 10 INJECTION, EMULSION INTRAVENOUS at 08:21

## 2024-03-11 RX ADMIN — ONDANSETRON 4 MG: 2 INJECTION INTRAMUSCULAR; INTRAVENOUS at 09:24

## 2024-03-11 RX ADMIN — ROCURONIUM BROMIDE 40 MG: 50 INJECTION, SOLUTION INTRAVENOUS at 07:44

## 2024-03-11 RX ADMIN — ROCURONIUM BROMIDE 10 MG: 50 INJECTION, SOLUTION INTRAVENOUS at 08:54

## 2024-03-11 RX ADMIN — SODIUM CHLORIDE, PRESERVATIVE FREE: 5 INJECTION INTRAVENOUS at 23:59

## 2024-03-11 RX ADMIN — PHENYLEPHRINE HYDROCHLORIDE 100 MCG: 10 INJECTION INTRAVENOUS at 07:44

## 2024-03-11 RX ADMIN — KETAMINE HYDROCHLORIDE 30 MG: 10 INJECTION INTRAMUSCULAR; INTRAVENOUS at 07:43

## 2024-03-11 RX ADMIN — LIDOCAINE HYDROCHLORIDE 50 MG: 10 INJECTION, SOLUTION INFILTRATION; PERINEURAL at 07:43

## 2024-03-11 RX ADMIN — SODIUM CHLORIDE, POTASSIUM CHLORIDE, SODIUM LACTATE AND CALCIUM CHLORIDE: 600; 310; 30; 20 INJECTION, SOLUTION INTRAVENOUS at 09:02

## 2024-03-11 RX ADMIN — GLYCOPYRROLATE 0.2 MG: 0.2 INJECTION INTRAMUSCULAR; INTRAVENOUS at 08:30

## 2024-03-11 RX ADMIN — Medication 2 G: at 07:35

## 2024-03-11 RX ADMIN — SODIUM CHLORIDE, PRESERVATIVE FREE: 5 INJECTION INTRAVENOUS at 12:02

## 2024-03-11 RX ADMIN — LISINOPRIL 20 MG: 20 TABLET ORAL at 15:34

## 2024-03-11 RX ADMIN — PHENYLEPHRINE HYDROCHLORIDE 100 MCG: 10 INJECTION INTRAVENOUS at 08:24

## 2024-03-11 RX ADMIN — PROPOFOL 40 MG: 10 INJECTION, EMULSION INTRAVENOUS at 09:04

## 2024-03-11 RX ADMIN — DEXAMETHASONE SODIUM PHOSPHATE 5 MG: 10 INJECTION, SOLUTION INTRAMUSCULAR; INTRAVENOUS at 08:06

## 2024-03-11 RX ADMIN — FENTANYL CITRATE 25 MCG: 50 INJECTION INTRAMUSCULAR; INTRAVENOUS at 07:36

## 2024-03-11 RX ADMIN — SODIUM CHLORIDE, POTASSIUM CHLORIDE, SODIUM LACTATE AND CALCIUM CHLORIDE: 600; 310; 30; 20 INJECTION, SOLUTION INTRAVENOUS at 06:45

## 2024-03-11 RX ADMIN — KETOROLAC TROMETHAMINE 15 MG: 30 INJECTION, SOLUTION INTRAMUSCULAR at 09:36

## 2024-03-11 ASSESSMENT — ACTIVITIES OF DAILY LIVING (ADL)
ADLS_ACUITY_SCORE: 25
ADLS_ACUITY_SCORE: 20
ADLS_ACUITY_SCORE: 25
ADLS_ACUITY_SCORE: 20
ADLS_ACUITY_SCORE: 25
ADLS_ACUITY_SCORE: 25
ADLS_ACUITY_SCORE: 35
ADLS_ACUITY_SCORE: 25
ADLS_ACUITY_SCORE: 25
ADLS_ACUITY_SCORE: 20
ADLS_ACUITY_SCORE: 25
ADLS_ACUITY_SCORE: 20
ADLS_ACUITY_SCORE: 25
ADLS_ACUITY_SCORE: 20
ADLS_ACUITY_SCORE: 20
ADLS_ACUITY_SCORE: 25

## 2024-03-11 NOTE — CONSULTS
[facility]  Consult Note - Hospitalist Service  Date of Admission:  3/11/2024  Consult Requested by: Dr. Quinn  Reason for Consult: Diabetes management    Assessment & Plan   Dotty Villanueva is a 73 year old female with PMHx of DM2 c/b gastroparesis, gastric banding in 2000, HTN, vitamin D deficiency, nephrolithiasis who presents for post-op admission after a left-sided percutaneous nephrolithotomy and cystoscopy with left ureteral stent placed.    #Left-sided nephrolithiasis  Underwent percutaneous nephrolithotomy and cystoscopy with left ureteral stent placement on 3/11/24 with Urology. Doing well post-op.  - Oliveira per Urology  - Pain controlled    #Type 2 DM without long-term insulin use  Well controlled.  - Hold hold glyburide to avoid hypoglycemia  - Sliding scale insulin added, but unlikely to need much, goal pre-prandial BG <180  - Continue home atorvastatin    #Gastroparesis  Severe on prior gastric-emptying study. Has a history of intermittent N/V from this.  - Patient can guide her diet, typically smaller more frequent meals help with this rather than TID meals    #Anxiety  PDMP reviewed, recently prescribed Ativan 2mg q6h PRN, a hefty dose for this patient, but should continue.  - Continue home Ativan, but hold or reduce dose of any signs of sedation    #HTN  - Continue lisinopril as substituted for home benazepril  - Continue home amlodipine    #GERD  History of esophagitis  - Continue home PPI BID, sucralfate    Medicine will follow       Clinically Significant Risk Factors Present on Admission                # Drug Induced Platelet Defect: home medication list includes an antiplatelet medication   # Hypertension: Noted on problem list     # DMII: A1C = N/A within past 6 months               THADDEUS MCMAHON MD  Hospitalist Service  Securely message with [employer] (more info)  Text page via [employer] Paging/Directory    ______________________________________________________________________    Chief Complaint   Post-op left percutaneous nephrolithotomy, cystoscopy with left ureteral stent placed    History is obtained from the patient    History of Present Illness   Dotty Villanueva is a 73 year old female with PMHx of DM2 c/b gastroparesis, gastric banding in 2000, HTN, vitamin D deficiency, nephrolithiasis who presents for post-op admission after a left-sided percutaneous nephrolithotomy and cystoscopy with left ureteral stent placed. Post-op pain is controlled. Tolerating clear liquids. Not requiring O2.      Past Medical History    Past Medical History:   Diagnosis Date    Anesthesia complication     O2 levels get low    Esophagitis     Essential hypertension     Gastroesophageal reflux disease with esophagitis     History of skin cancer     HTN (hypertension)     Hydronephrosis of left kidney     Hyperlipidemia     Kidney stone     2/20/2024    Malignant neoplasm of skin 07/18/2018    Formatting of this note might be different from the original. 7/2018- left chin, SCCIS- Narrow negative margins. Observe    Osteopenia     Pulmonary nodules 02/18/2016    Formatting of this note might be different from the original. Stable on CT for 24 months, per [place] [facility], no follow up needed 1/2016    Pure hypercholesterolemia     Renal calculi 04/26/2017    Stage 3 chronic kidney disease (H) 10/30/2023    Formatting of this note might be different from the original. 3a    Toxic multinodular goiter 10/30/2023    Formatting of this note might be different from the original. partially supressed TSH -2003-0.08;0.18 6/07; 0.11 10/07 TSH supressed 12/07 bilateral > 2cm dominant nodules-12/07 on us - negative fna bilateral 1/08 13% uptake with cold nodule right 12/07 51 mCi -1/31/08    Type 2 diabetes mellitus (H)     Vitamin D deficiency        Past Surgical History   Past Surgical History:   Procedure Laterality Date    APPENDECTOMY       CHOLECYSTECTOMY      ESOPHAGOSCOPY, GASTROSCOPY, DUODENOSCOPY (EGD), COMBINED N/A 11/01/2023    Procedure: ESOPHAGOGASTRODUODENOSCOPY WITH BIOPSY AND BALLOON DILATION;  Surgeon: Mika Leon MD;  Location: [facility]    HAND CONTRACTURE RELEASE Left     HYSTERECTOMY      Complicated by bowel laceration.  Required colonic repair.    IR NEPHROLITHOTOMY  3/11/2024    LAPAROSCOPIC CONVERTED TO OPEN ABDOMINAL HYSTERECTOMY WITH TRANSVERSE COLON REPAIR      bowel laceration    LAPAROSCOPIC GASTRIC BANDING  2000    LEEP TX, CERVICAL      THYROID BIOPSY      TUBAL LIGATION         Medications   I have reviewed this patient's current medications  Medications Prior to Admission   Medication Sig Dispense Refill Last Dose    amLODIPine (NORVASC) 5 MG tablet Take 5 mg by mouth daily   3/10/2024 at am    aspirin (ASA) 81 MG chewable tablet Take 81 mg by mouth daily   Past Month at held for 2 weeks am    atorvastatin (LIPITOR) 20 MG tablet Take 60 mg by mouth every evening   Past Month at held for 2 weeks am    benazepril (LOTENSIN) 40 MG tablet Take 40 mg by mouth daily   3/10/2024 at am    calcium carbonate (OS-VONNIE) 1500 (600 Ca) MG tablet Take 1 tablet by mouth daily   Past Month at held for 2 weeks am    calcium-magnesium (CALMAG) 500-250 MG TABS per tablet Take 1 tablet by mouth daily (before supper)   Past Month at held for 2 weeks am    cholecalciferol (VITAMIN D3) 125 mcg (5000 units) capsule Take 250 mcg by mouth daily (before supper)   Past Month at held for 2 weeks am    cinnamon 500 MG CAPS Take 1 tablet by mouth daily   Past Month at held for 2 weeks am    co-enzyme Q-10 100 MG CAPS capsule Take 100 mg by mouth daily (before supper)   Past Month at held for 2 weeks am    cyanocobalamin (CYANOCOBALAMIN) 1000 MCG/ML injection Inject 1 mL into the muscle every 7 days   Past Week at 3/6/24    erythromycin (ROMYCIN) 5 MG/GM ophthalmic ointment Apply 1 strip to eye as needed Erythromycin ophthalmic ointment 0.05%  apply 1 strip to both eyes 6 times a daily prn   More than a month at prn    fluticasone (FLONASE) 50 MCG/ACT nasal spray Spray 1 spray into both nostrils daily as needed   3/10/2024 at am    glimepiride (AMARYL) 2 MG tablet Take 2 mg by mouth daily (before lunch)   3/10/2024 at late morning    LORazepam (ATIVAN) 2 MG tablet Take 2 mg by mouth every 6 hours as needed for anxiety   Past Week at prn    melatonin 1 MG TABS tablet Take 1 mg by mouth nightly as needed for sleep   Past Week at prn    Multiple Vitamin (MULTIVITAMIN ADULT PO) Take 1 tablet by mouth daily   Past Month at held for 2 weeks am    ondansetron (ZOFRAN ODT) 4 MG ODT tab Take 1 tablet (4 mg) by mouth every 6 hours as needed for nausea or vomiting 30 tablet 0 Past Week at prn    pantoprazole (PROTONIX) 40 MG EC tablet Take 1 tablet (40 mg) by mouth 2 times daily (before meals) 60 tablet 0 3/10/2024 at pm    potassium chloride (KLOR-CON) 20 MEQ packet Take 20 mEq by mouth 2 times daily   Past Week at am    sucralfate (CARAFATE) 1 GM/10ML suspension Take 1 g by mouth 3 times daily (before meals)   Past Week    traZODone (DESYREL) 100 MG tablet Take 100 mg by mouth nightly as needed for sleep   Past Week at pm             Physical Exam   Vital Signs: Temp: 97.4  F (36.3  C) Temp src: Oral BP: (!) 149/68 Pulse: 73   Resp: 18 SpO2: 97 % O2 Device: None (Room air) Oxygen Delivery: 1 LPM  Weight: 134 lbs 12.8 oz    General Appearance: NAD, well appearing  Respiratory: CTAB  Cardiovascular: RRR. No m/r/g  GI: Soft. NT/ND  Skin: No rashes no lower extremities  Ext: No LE edema    Medical Decision Making       60 MINUTES SPENT BY ME on the date of service doing chart review, history, exam, documentation & further activities per the note.      Data         Imaging results reviewed over the past 24 hrs:   Recent Results (from the past 24 hour(s))   XR Surgery KIRA G/T 5 Min Fluoro    Narrative    [facility]  LOCATION: [facility]  "HOSPITAL  DATE: 3/11/2024    PROCEDURE: LEFT PERCUTANEOUS NEPHROLITHOTOMY PERFORMED IN CONJUNCTION WITH UROLOGY    INTERVENTIONAL RADIOLOGIST: AMBER Diallo MD.  UROLOGIST: Dr. Kai MD.    INDICATION: Large left renal pelvic stone.    SEDATION: General endotracheal anesthesia.    CONTRAST: 50 mL Omnipaque into the urinary system.  ANTIBIOTICS: Per anesthesia.  ADDITIONAL MEDICATIONS: None.    FLUOROSCOPIC TIME: 16.6 minutes.  RADIATION DOSE: Air Kerma: 158.3 mGy.    COMPLICATIONS: No immediate complications.    STERILE BARRIER TECHNIQUE: Maximum sterile barrier technique was used. Cutaneous antisepsis was performed at the operative site followed by placement of a large standard sterile drape. Prior to the procedure, the operators and assistants performed hand   hygiene and wore hat, mask, sterile gown, and sterile gloves during the entire procedure.    PROCEDURE: Prior to the interventional radiology portion of the procedure, Dr. Quinn placed a retrograde ureteral catheter into the renal pelvis. Multi projectional retrograde pyelography was performed through the ureteral catheter in order to better   define the complexity of the intrarenal collecting system, calculi location and overall burden, and determine the most appropriate initial percutaneous access to allow for nephrolithotomy. This demonstrated a large renal pelvic stone with contrast   filling multiple calyces. The most suitable site for percutaneous nephrolithotomy is deemed to be a posterior lower pole calyx.      Under direct fluoroscopic guidance, an AccuStick needle was inserted inserted into a posterior lower pole calyx of the left kidney from a low intercostal approach below the 12th rib. A wire was manipulated through the ureter and into the urinary bladder.   The tract was dilated, and a 10 Omani sheath was placed, through which a second \"safety\" wire was advanced into the urinary bladder. Over the initial wire, the percutaneous " nephrostomy access was dilated with a 30 Lebanese NephroMax balloon, and a 30   Lebanese sheath was placed into the intrarenal collecting system.     Dr. Quinn performed percutaneous nephrolithotomy through the access sheath. Please refer to the dedicated urology note for specific details.    Following percutaneous nephrolithotomy, multi projectional fluoroscopic images were obtained to evaluate for any residual radiopaque calculi. This demonstrated no residual radiopaque calculi are identified.  A 5 Lebanese catheter was placed over one of the   wires into the renal pelvis. Antegrade pyelography was performed through the catheter to provide diagnostic information for ureteral stent placement. Over the second wire, an 8F x 24 cm ureteral stent was placed in an antegrade fashion. The distal loop   was formed within the urinary bladder and the proximal loop within the renal pelvis. The 5 Lebanese catheter was removed. A gelfoam slurry was instilled through the stent pusher as it was removed along the percutaneous pathway.     The completion images show the newly placed ureteral stent with its distal loop in the urinary bladder and proximal loop in the renal collecting system.      Impression    IMPRESSION:   1.  Successful percutaneous nephrostomy access for percutaneous nephrolithotomy, as discussed above.   2.  Percutaneous nephrolithotomy. Please refer to the dedicated urologist procedure note for specific details.   3.  Successful antegrade placement of a ureteral stent which fluoroscopically is in appropriate position.     PLAN:  1. Patient to be recovered in PACU.  2. Remaining cares as per Urology.     IR Nephrolithotomy    Narrative    [facility]  LOCATION: [facility]  DATE: 3/11/2024    PROCEDURE: LEFT PERCUTANEOUS NEPHROLITHOTOMY PERFORMED IN CONJUNCTION WITH UROLOGY    INTERVENTIONAL RADIOLOGIST: AMBER Diallo MD.  UROLOGIST: Dr. Kai MD.    INDICATION: Large left renal  "pelvic stone.    SEDATION: General endotracheal anesthesia.    CONTRAST: 50 mL Omnipaque into the urinary system.  ANTIBIOTICS: Per anesthesia.  ADDITIONAL MEDICATIONS: None.    FLUOROSCOPIC TIME: 16.6 minutes.  RADIATION DOSE: Air Kerma: 158.3 mGy.    COMPLICATIONS: No immediate complications.    STERILE BARRIER TECHNIQUE: Maximum sterile barrier technique was used. Cutaneous antisepsis was performed at the operative site followed by placement of a large standard sterile drape. Prior to the procedure, the operators and assistants performed hand   hygiene and wore hat, mask, sterile gown, and sterile gloves during the entire procedure.    PROCEDURE: Prior to the interventional radiology portion of the procedure, Dr. Quinn placed a retrograde ureteral catheter into the renal pelvis. Multi projectional retrograde pyelography was performed through the ureteral catheter in order to better   define the complexity of the intrarenal collecting system, calculi location and overall burden, and determine the most appropriate initial percutaneous access to allow for nephrolithotomy. This demonstrated a large renal pelvic stone with contrast   filling multiple calyces. The most suitable site for percutaneous nephrolithotomy is deemed to be a posterior lower pole calyx.      Under direct fluoroscopic guidance, an AccuStick needle was inserted inserted into a posterior lower pole calyx of the left kidney from a low intercostal approach below the 12th rib. A wire was manipulated through the ureter and into the urinary bladder.   The tract was dilated, and a 10 Honduran sheath was placed, through which a second \"safety\" wire was advanced into the urinary bladder. Over the initial wire, the percutaneous nephrostomy access was dilated with a 30 Honduran NephroMax balloon, and a 30   Honduran sheath was placed into the intrarenal collecting system.     Dr. Quinn performed percutaneous nephrolithotomy through the access sheath. Please " refer to the dedicated urology note for specific details.    Following percutaneous nephrolithotomy, multi projectional fluoroscopic images were obtained to evaluate for any residual radiopaque calculi. This demonstrated no residual radiopaque calculi are identified.  A 5 Turkmen catheter was placed over one of the   wires into the renal pelvis. Antegrade pyelography was performed through the catheter to provide diagnostic information for ureteral stent placement. Over the second wire, an 8F x 24 cm ureteral stent was placed in an antegrade fashion. The distal loop   was formed within the urinary bladder and the proximal loop within the renal pelvis. The 5 Turkmen catheter was removed. A gelfoam slurry was instilled through the stent pusher as it was removed along the percutaneous pathway.     The completion images show the newly placed ureteral stent with its distal loop in the urinary bladder and proximal loop in the renal collecting system.      Impression    IMPRESSION:   1.  Successful percutaneous nephrostomy access for percutaneous nephrolithotomy, as discussed above.   2.  Percutaneous nephrolithotomy. Please refer to the dedicated urologist procedure note for specific details.   3.  Successful antegrade placement of a ureteral stent which fluoroscopically is in appropriate position.     PLAN:  1. Patient to be recovered in PACU.  2. Remaining cares as per Urology.

## 2024-03-11 NOTE — ANESTHESIA POSTPROCEDURE EVALUATION
Patient: Dotty Villanueva    Procedure: Procedure(s):  CYSTOSCOPY, LEFT PERCUTANEOUS NEPHROLITHOTOMY       Anesthesia Type:  General    Note:  Disposition: Outpatient   Postop Pain Control: Uneventful            Sign Out: Well controlled pain   PONV: No   Neuro/Psych: Uneventful            Sign Out: Acceptable/Baseline neuro status   Airway/Respiratory: Uneventful            Sign Out: O2 supplementation               Oxygen: Nasal Cannula   CV/Hemodynamics: Uneventful            Sign Out: Acceptable CV status; No obvious hypovolemia; No obvious fluid overload   Other NRE: NONE   DID A NON-ROUTINE EVENT OCCUR?            Last vitals:  Vitals Value Taken Time   /72 03/11/24 1015   Temp 36.7  C (98.1  F) 03/11/24 1000   Pulse 78 03/11/24 1016   Resp 14 03/11/24 1016   SpO2 100 % 03/11/24 1016   Vitals shown include unfiled device data.    Electronically Signed By: Anil Khan MD  March 11, 2024  10:18 AM   verbal cues/1 person assist

## 2024-03-11 NOTE — PROGRESS NOTES
Urology notified to clarify chapman catheter order as order says to discontinue catheter. Per urology- will contact Dr. Quinn to clarify order as typically chapman catheter is kept in overnight, will call back when they get a response. Will keep chapman in until further information is obtained.     1458: Urology called back, per Dr. Quinn, keep chapman in overnight, possibly remove in AM. Information relayed to next shift RN

## 2024-03-11 NOTE — OP NOTE
OPERATIVE REPORT    Date of Surgery: 3/11/2024    Place of Service:  [facility]    Preoperative diagnosis: :left nephrolithiasis    Postoperative diagnosis: Same     Procedure:   1.) cystoscopy  2.)  :left  ureteral catheterization  3.)  :left percutaneous nephrolithotomy  greater than 2cm    Surgeon: Dr. Kevin Quinn MD    Interventional Radiologist:  Solis Diallo MD    Anesthesia: General plus local    EBL: 50    Complications: none    Findings:  1.8cm renal pelvis stone, 2 small 3mm left lower pole stones    Drains: Oliveira catheter and Double-J ureteral stent    Specimen: Stone    Operative indication: The patient is a 73-year-old female with a large left renal stone burden who now presents for percutaneous nephrolithotomy    Operative procedure:  The patient was brought back to the operating room..  General anesthesia was induced.  The patient was placed in the prone position.  The genitals were prepped and draped in standard sterile.  IV antibiotics were administered for prophylaxis.      The flexible cystoscope was inserted through the urethra into the bladder.  The bladder was inspected and there was no suspicious tumors, lesions, stones, or other abnormalities. The  :left ureteral orifice was identified and intubated with the iScreen Vision guidewire.  The wire was passed up into the renal pelvis under fluoroscopic guidance.  Over this a 5 Kuwaiti open-ended catheter was placed into the pelvis and the wire was removed.  A Oliveira catheter was placed.      The interventional radiologist then performed a retrograde pyelogram through the existing open-ended catheter.  We selected a lower pole posterior calyx for access, which was infracostal.  The desired calyx was punctured and 2 wires were placed down into the bladder.  The interventional radiologist then used a balloon dilator to dilate to 30 Kuwaiti tract into the kidney.  The 30Fr access sheath was placed and the balloon was removed, leaving 2  safety wires in place.    I then inserted the rigid nephroscope and came upon the stone.  The stone was broken down using ultrasonic lithotripsy.    I removed as many fragments as possible with the rigid scope.      I then inserted the flexible nephroscope and completely inspected each individual calyx using an antegrade pyelogram as a roadmap.  Finally, the rigid scope was inserted one more time to evacuate any tiny remaining stone particles.      The scope was then removed and the tract was inspected.  There was no active bleeding.  The interventional radiologist then placed an 8 x 24 cm double-J ureteral stent in an antegrade fashion.  There was a good curl in the kidney and a good curl in the bladder.  The access sheath was removed.  There was no active bleeding bleeding coming from the tract.      The patient tolerated the procedure well and was brought to the recovery room in stable condition.    Disposition: We will obtain a CT scan on postoperative day one to rule out any residual calculi.                                                                                                                                                                                                                                                                                                                                                                    Kevin Quinn MD

## 2024-03-11 NOTE — PLAN OF CARE
Problem: Pain Acute  Goal: Optimal Pain Control and Function  Outcome: Progressing     Goal Outcome Evaluation:    Plan of Care Reviewed With: patient    A&O. RA. Capno. Denies pain. Regular diet. SBA. Oliveira intact. L lower back dressing CDI. Continue POC.

## 2024-03-11 NOTE — PLAN OF CARE
PRIMARY DIAGNOSIS: Kidney Stone  OUTPATIENT/OBSERVATION GOALS TO BE MET BEFORE DISCHARGE:  ADLs back to baseline: No    Activity and level of assistance: Up with standby assistance.    Pain status: Pain free.    Return to near baseline physical activity: No     Discharge Planner Nurse   Safe discharge environment identified: No  Barriers to discharge: Yes       Entered by: Ezequiel Barnes RN 03/11/2024 12:00PM     Please review provider order for any additional goals.   Nurse to notify provider when observation goals have been met and patient is ready for discharge.

## 2024-03-11 NOTE — PLAN OF CARE
Patient admitted to room 03 at approximately 1200 via bed from PACU.  Reason for Admission: Kidney stone  Report received from: PACU  Patient was accompanied by Spouse.  Discharge transportation provided by:  Patient ambulated/transferred:  with one assist. self.  Patient is alert and orientated x 3.  Outpatient Observation education provided to: (patient, family, friend)  MDRO Education done if applicable (MRSA, VRE, etc)  Safety risks were identified during admission:  fall.   Yellow risk/fall band applied:  Yes  Detailed Belongings: with

## 2024-03-11 NOTE — ANESTHESIA PREPROCEDURE EVALUATION
Anesthesia Pre-Procedure Evaluation    Patient: Dotty Villanueva   MRN: [id number] : 1950        Procedure : Procedure(s):  LEFT PERCUTANEOUS NEPHROLITHOTOMY          Past Medical History:   Diagnosis Date    Anesthesia complication     O2 levels get low    Esophagitis     Essential hypertension     Gastroesophageal reflux disease with esophagitis     History of skin cancer     HTN (hypertension)     Hydronephrosis of left kidney     Hyperlipidemia     Kidney stone     2024    Malignant neoplasm of skin 2018    Formatting of this note might be different from the original. 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 Observe    Osteopenia     Pulmonary nodules 2016    Formatting of this note might be different from the original. Stable on CT for 24 months, per [place] [facility], no follow up needed 2016    Pure hypercholesterolemia     Renal calculi 2017    Stage 3 chronic kidney disease (H) 10/30/2023    Formatting of this note might be different from the original. 3a    Toxic multinodular goiter 10/30/2023    Formatting of this note might be different from the original. partially supressed TSH --0.08;0.18 ; 0.11 10/07 TSH supressed  bilateral > 2cm dominant nodules- on us - negative fna bilateral  13% uptake with cold nodule right  51 mCi -08    Type 2 diabetes mellitus (H)     Vitamin D deficiency       Past Surgical History:   Procedure Laterality Date    APPENDECTOMY      CHOLECYSTECTOMY      ESOPHAGOSCOPY, GASTROSCOPY, DUODENOSCOPY (EGD), COMBINED N/A 2023    Procedure: ESOPHAGOGASTRODUODENOSCOPY WITH BIOPSY AND BALLOON DILATION;  Surgeon: Mika Leon MD;  Location: [facility] Main OR    HAND CONTRACTURE RELEASE Left     HYSTERECTOMY      Complicated by bowel laceration.  Required colonic repair.    LAPAROSCOPIC CONVERTED TO OPEN ABDOMINAL HYSTERECTOMY WITH TRANSVERSE COLON REPAIR      bowel laceration    LAPAROSCOPIC GASTRIC BANDING   2000    LEEP TX, CERVICAL      THYROID BIOPSY      TUBAL LIGATION        Allergies   Allergen Reactions    Enalapril Cough    Morphine Rash     And itching    Penicillins Nausea and Vomiting and Rash      Social History     Tobacco Use    Smoking status: Never    Smokeless tobacco: Never   Substance Use Topics    Alcohol use: Yes     Comment: Rare      Wt Readings from Last 1 Encounters:   03/11/24 61.1 kg (134 lb 12.8 oz)        Anesthesia Evaluation   Pt has had prior anesthetic.     History of anesthetic complications  - .      ROS/MED HX  ENT/Pulmonary:  - neg pulmonary ROS     Neurologic:  - neg neurologic ROS     Cardiovascular:     (+) Dyslipidemia hypertension- -   -  - -                                      METS/Exercise Tolerance: >4 METS    Hematologic:  - neg hematologic  ROS     Musculoskeletal:  - neg musculoskeletal ROS     GI/Hepatic:     (+) GERD, Asymptomatic on medication,                  Renal/Genitourinary:     (+) renal disease, type: CRI,            Endo:     (+)  type II DM,        thyroid problem,            Psychiatric/Substance Use:  - neg psychiatric ROS     Infectious Disease:  - neg infectious disease ROS     Malignancy:  - neg malignancy ROS     Other:  - neg other ROS          Physical Exam    Airway  airway exam normal      Mallampati: II   TM distance: > 3 FB   Neck ROM: full   Mouth opening: > 3 cm    Respiratory Devices and Support         Dental  no notable dental history         Cardiovascular   cardiovascular exam normal          Pulmonary   pulmonary exam normal                OUTSIDE LABS:  CBC:   Lab Results   Component Value Date    WBC 8.7 10/30/2023    WBC 7.2 04/25/2023    HGB 13.4 10/30/2023    HGB 12.6 04/25/2023    HCT 41.5 10/30/2023    HCT 37.3 04/25/2023     10/30/2023     04/25/2023     BMP:   Lab Results   Component Value Date     11/02/2023     11/01/2023    POTASSIUM 3.9 11/02/2023    POTASSIUM 3.8 11/01/2023    CHLORIDE 110 (H)  "11/02/2023    CHLORIDE 108 (H) 11/01/2023    CO2 24 11/02/2023    CO2 27 11/01/2023    BUN 5.1 (L) 11/02/2023    BUN 5.2 (L) 11/01/2023    CR 0.89 11/02/2023    CR 0.86 11/01/2023    GLC 79 03/11/2024     (H) 11/02/2023     COAGS: No results found for: \"PTT\", \"INR\", \"FIBR\"  POC: No results found for: \"BGM\", \"HCG\", \"HCGS\"  HEPATIC:   Lab Results   Component Value Date    ALBUMIN 3.5 10/30/2023    PROTTOTAL 6.1 (L) 10/30/2023    ALT 10 10/30/2023    AST 19 10/30/2023    ALKPHOS 84 10/30/2023    BILITOTAL 0.4 10/30/2023     OTHER:   Lab Results   Component Value Date    A1C 6.5 (H) 10/30/2023    VONNIE 9.4 11/02/2023    LIPASE 13 10/30/2023       Anesthesia Plan    ASA Status:  2    NPO Status:  NPO Appropriate    Anesthesia Type: General.     - Airway: ETT   Induction: Intravenous, Propofol.   Maintenance: Balanced.        Consents    Anesthesia Plan(s) and associated risks, benefits, and realistic alternatives discussed. Questions answered and patient/representative(s) expressed understanding.     - Discussed:     - Discussed with:  Patient      - Extended Intubation/Ventilatory Support Discussed: No.      - Patient is DNR/DNI Status: No     Use of blood products discussed: No .     Postoperative Care    Pain management: IV analgesics, Multi-modal analgesia.     - Plan for long acting post-op opioid use   PONV prophylaxis: Ondansetron (or other 5HT-3), Dexamethasone or Solumedrol, Droperidol or Haldol     Comments:    Other Comments: 30 mg ketamine IV on induction.             Anil Khan MD    I have reviewed the pertinent notes and labs in the chart from the past 30 days and (re)examined the patient.  Any updates or changes from those notes are reflected in this note.             # Drug Induced Platelet Defect: home medication list includes an antiplatelet medication  # DMII: A1C = N/A within past 6 months      "

## 2024-03-11 NOTE — ANESTHESIA CARE TRANSFER NOTE
Patient: Dotty Villanueva    Procedure: Procedure(s):  CYSTOSCOPY, LEFT PERCUTANEOUS NEPHROLITHOTOMY       Diagnosis: Kidney stone [N20.0]  Diagnosis Additional Information: No value filed.    Anesthesia Type:   General     Note:    Oropharynx: oropharynx clear of all foreign objects  Level of Consciousness: drowsy  Oxygen Supplementation: face mask  Level of Supplemental Oxygen (L/min / FiO2): 10  Independent Airway: airway patency satisfactory and stable  Dentition: dentition unchanged  Vital Signs Stable: post-procedure vital signs reviewed and stable  Report to RN Given: handoff report given  Patient transferred to: PACU    Handoff Report: Identifed the Patient, Identified the Reponsible Provider, Reviewed the pertinent medical history, Discussed the surgical course, Reviewed Intra-OP anesthesia mangement and issues during anesthesia, Set expectations for post-procedure period and Allowed opportunity for questions and acknowledgement of understanding      Vitals:  Vitals Value Taken Time   /78 03/11/24 1000   Temp 36.7  C (98.1  F) 03/11/24 1000   Pulse 86 03/11/24 1002   Resp 11 03/11/24 1002   SpO2 100 % 03/11/24 1002   Vitals shown include unfiled device data.    Electronically Signed By: KIRSTIN Loomis CRNA  March 11, 2024  10:04 AM

## 2024-03-11 NOTE — BRIEF OP NOTE
[facility]    Brief Operative Note    Pre-operative diagnosis: Kidney stone [N20.0]  Post-operative diagnosis Same as pre-operative diagnosis    Procedure: LEFT PERCUTANEOUS NEPHROLITHOTOMY, Left - Abdomen    Surgeon: Surgeon(s) and Role:     * Kevin Quinn MD - Primary     * Melecio Diallo MD  Anesthesia: General   Estimated Blood Loss: Less than 100 ml    Drains: Stent   Specimens:   ID Type Source Tests Collected by Time Destination   A : Left kidney stone Calculus/Stone Kidney, Left STONE ANALYSIS Kevin Quinn MD 3/11/2024  9:26 AM      Findings:   Large renal pelvis stone .  Complications: None.  Implants:   Implant Name Type Inv. Item Serial No.  Lot No. LRB No. Used Action   STENT URET 24CM 8FR PRCFLX [id number] - [id number] Stent STENT URET 24CM 8FR PRCFLX [id number]  [employer] [id number] Left 1 Implanted

## 2024-03-11 NOTE — ANESTHESIA PROCEDURE NOTES
Airway       Patient location during procedure: OR       Procedure Start/Stop Times: 3/11/2024 7:46 AM  Staff -        Anesthesiologist:  Anil Khan MD       CRNA: Elise Marr APRN CRNA       Performed By: CRNA and anesthesiologist  Consent for Airway        Urgency: elective  Indications and Patient Condition       Indications for airway management: manisha-procedural       Induction type:intravenous       Mask difficulty assessment: 1 - vent by mask    Final Airway Details       Final airway type: endotracheal airway       Successful airway: ETT - single  Endotracheal Airway Details        ETT size (mm): 7.0       Successful intubation technique: direct laryngoscopy       DL Blade Type: Escoto 2       Grade View of Cords: 1       Adjucts: stylet       Position: Right       Measured from: lips       Secured at (cm): 21       Bite block used: None    Post intubation assessment        Placement verified by: capnometry, equal breath sounds and chest rise        Number of attempts at approach: 1       Secured with: tape       Ease of procedure: easy       Dentition: Intact and Unchanged       Dental guard used and removed.    Medication(s) Administered   Medication Administration Time: 3/11/2024 7:46 AM

## 2024-03-11 NOTE — PHARMACY-ADMISSION MEDICATION HISTORY
Pharmacist Admission Medication History    Admission medication history is complete. The information provided in this note is only as accurate as the sources available at the time of the update.    Information Source(s): Patient via in-person    Pertinent Information: Patient has been holding all supplements for 2 weeks prior to procedure.     Changes made to PTA medication list:  Added: Klor Anderson Packet, Melatonin  Deleted: Childrens chewable multivitamin, Metoclopramide, zinc   Changed: Erythromycin eye ointment to as needed    Allergies reviewed with patient and updates made in EHR: yes    Medication History Completed By: TATUM MCCLENDON [facility] 3/11/2024 6:51 AM    PTA Med List   Medication Sig Last Dose    amLODIPine (NORVASC) 5 MG tablet Take 5 mg by mouth daily 3/10/2024 at am    aspirin (ASA) 81 MG chewable tablet Take 81 mg by mouth daily Past Month at held for 2 weeks am    atorvastatin (LIPITOR) 20 MG tablet Take 60 mg by mouth every evening Past Month at held for 2 weeks am    benazepril (LOTENSIN) 40 MG tablet Take 40 mg by mouth daily 3/10/2024 at am    calcium carbonate (OS-VONNIE) 1500 (600 Ca) MG tablet Take 1 tablet by mouth daily Past Month at held for 2 weeks am    calcium-magnesium (CALMAG) 500-250 MG TABS per tablet Take 1 tablet by mouth daily (before supper) Past Month at held for 2 weeks am    cholecalciferol (VITAMIN D3) 125 mcg (5000 units) capsule Take 250 mcg by mouth daily (before supper) Past Month at held for 2 weeks am    cinnamon 500 MG CAPS Take 1 tablet by mouth daily Past Month at held for 2 weeks am    co-enzyme Q-10 100 MG CAPS capsule Take 100 mg by mouth daily (before supper) Past Month at held for 2 weeks am    cyanocobalamin (CYANOCOBALAMIN) 1000 MCG/ML injection Inject 1 mL into the muscle every 7 days Past Week at 3/6/24    erythromycin (ROMYCIN) 5 MG/GM ophthalmic ointment Apply 1 strip to eye as needed Erythromycin ophthalmic ointment 0.05% apply 1 strip to both eyes 6 times a  daily prn More than a month at prn    fluticasone (FLONASE) 50 MCG/ACT nasal spray Spray 1 spray into both nostrils daily as needed 3/10/2024 at am    glimepiride (AMARYL) 2 MG tablet Take 2 mg by mouth daily (before lunch) 3/10/2024 at late morning    LORazepam (ATIVAN) 2 MG tablet Take 2 mg by mouth every 6 hours as needed for anxiety Past Week at prn    melatonin 1 MG TABS tablet Take 1 mg by mouth nightly as needed for sleep Past Week at prn    Multiple Vitamin (MULTIVITAMIN ADULT PO) Take 1 tablet by mouth daily Past Month at held for 2 weeks am    ondansetron (ZOFRAN ODT) 4 MG ODT tab Take 1 tablet (4 mg) by mouth every 6 hours as needed for nausea or vomiting Past Week at prn    pantoprazole (PROTONIX) 40 MG EC tablet Take 1 tablet (40 mg) by mouth 2 times daily (before meals) 3/10/2024 at pm    potassium chloride (KLOR-CON) 20 MEQ packet Take 20 mEq by mouth 2 times daily Past Week at am    sucralfate (CARAFATE) 1 GM/10ML suspension Take 1 g by mouth 3 times daily (before meals) Past Week    traZODone (DESYREL) 100 MG tablet Take 100 mg by mouth nightly as needed for sleep Past Week at pm

## 2024-03-12 ENCOUNTER — APPOINTMENT (OUTPATIENT)
Dept: CT IMAGING | Facility: HOSPITAL | Age: 74
DRG: 327 | End: 2024-03-12
Attending: INTERNAL MEDICINE
Payer: MEDICARE

## 2024-03-12 ENCOUNTER — APPOINTMENT (OUTPATIENT)
Dept: RADIOLOGY | Facility: HOSPITAL | Age: 74
DRG: 327 | End: 2024-03-12
Attending: INTERNAL MEDICINE
Payer: MEDICARE

## 2024-03-12 PROBLEM — K31.1 GASTRIC OUTLET OBSTRUCTION: Status: ACTIVE | Noted: 2023-10-30

## 2024-03-12 LAB
ALBUMIN SERPL BCG-MCNC: 3.1 G/DL (ref 3.5–5.2)
ALBUMIN UR-MCNC: NEGATIVE MG/DL
ALP SERPL-CCNC: 64 U/L (ref 40–150)
ALT SERPL W P-5'-P-CCNC: 9 U/L (ref 0–50)
AMORPH CRY #/AREA URNS HPF: ABNORMAL /HPF
ANION GAP SERPL CALCULATED.3IONS-SCNC: 9 MMOL/L (ref 7–15)
APPEARANCE UR: CLEAR
AST SERPL W P-5'-P-CCNC: 11 U/L (ref 0–45)
ATRIAL RATE - MUSE: 83 BPM
BACTERIA #/AREA URNS HPF: ABNORMAL /HPF
BASOPHILS # BLD AUTO: 0 10E3/UL (ref 0–0.2)
BASOPHILS NFR BLD AUTO: 0 %
BILIRUB SERPL-MCNC: 0.5 MG/DL
BILIRUB UR QL STRIP: NEGATIVE
BUN SERPL-MCNC: 11.4 MG/DL (ref 8–23)
CALCIUM SERPL-MCNC: 10.1 MG/DL (ref 8.8–10.2)
CHLORIDE SERPL-SCNC: 103 MMOL/L (ref 98–107)
COLOR UR AUTO: COLORLESS
CREAT SERPL-MCNC: 0.96 MG/DL (ref 0.51–0.95)
DEPRECATED HCO3 PLAS-SCNC: 24 MMOL/L (ref 22–29)
DIASTOLIC BLOOD PRESSURE - MUSE: NORMAL MMHG
EGFRCR SERPLBLD CKD-EPI 2021: 62 ML/MIN/1.73M2
EOSINOPHIL # BLD AUTO: 0 10E3/UL (ref 0–0.7)
EOSINOPHIL NFR BLD AUTO: 0 %
ERYTHROCYTE [DISTWIDTH] IN BLOOD BY AUTOMATED COUNT: 13.2 % (ref 10–15)
GLUCOSE BLDC GLUCOMTR-MCNC: 107 MG/DL (ref 70–99)
GLUCOSE BLDC GLUCOMTR-MCNC: 108 MG/DL (ref 70–99)
GLUCOSE BLDC GLUCOMTR-MCNC: 170 MG/DL (ref 70–99)
GLUCOSE BLDC GLUCOMTR-MCNC: 84 MG/DL (ref 70–99)
GLUCOSE SERPL-MCNC: 202 MG/DL (ref 70–99)
GLUCOSE UR STRIP-MCNC: NEGATIVE MG/DL
HCT VFR BLD AUTO: 36.2 % (ref 35–47)
HGB BLD-MCNC: 11.6 G/DL (ref 11.7–15.7)
HGB UR QL STRIP: ABNORMAL
HOLD SPECIMEN: NORMAL
HYALINE CASTS: 6 /LPF
IMM GRANULOCYTES # BLD: 0.1 10E3/UL
IMM GRANULOCYTES NFR BLD: 1 %
INTERPRETATION ECG - MUSE: NORMAL
KETONES UR STRIP-MCNC: NEGATIVE MG/DL
LACTATE SERPL-SCNC: 1.7 MMOL/L (ref 0.7–2)
LACTATE SERPL-SCNC: 3.3 MMOL/L (ref 0.7–2)
LACTATE SERPL-SCNC: 3.4 MMOL/L (ref 0.7–2)
LEUKOCYTE ESTERASE UR QL STRIP: ABNORMAL
LYMPHOCYTES # BLD AUTO: 0.8 10E3/UL (ref 0.8–5.3)
LYMPHOCYTES NFR BLD AUTO: 8 %
MAGNESIUM SERPL-MCNC: 1.8 MG/DL (ref 1.7–2.3)
MCH RBC QN AUTO: 28.6 PG (ref 26.5–33)
MCHC RBC AUTO-ENTMCNC: 32 G/DL (ref 31.5–36.5)
MCV RBC AUTO: 89 FL (ref 78–100)
MONOCYTES # BLD AUTO: 0.6 10E3/UL (ref 0–1.3)
MONOCYTES NFR BLD AUTO: 7 %
MUCOUS THREADS #/AREA URNS LPF: PRESENT /LPF
NEUTROPHILS # BLD AUTO: 7.5 10E3/UL (ref 1.6–8.3)
NEUTROPHILS NFR BLD AUTO: 84 %
NITRATE UR QL: NEGATIVE
NRBC # BLD AUTO: 0 10E3/UL
NRBC BLD AUTO-RTO: 0 /100
P AXIS - MUSE: 56 DEGREES
PH UR STRIP: 7.5 [PH] (ref 5–7)
PLATELET # BLD AUTO: 405 10E3/UL (ref 150–450)
POTASSIUM SERPL-SCNC: 4.4 MMOL/L (ref 3.4–5.3)
PR INTERVAL - MUSE: 200 MS
PROCALCITONIN SERPL IA-MCNC: 0.03 NG/ML
PROT SERPL-MCNC: 5.8 G/DL (ref 6.4–8.3)
QRS DURATION - MUSE: 100 MS
QT - MUSE: 394 MS
QTC - MUSE: 462 MS
R AXIS - MUSE: -40 DEGREES
RBC # BLD AUTO: 4.05 10E6/UL (ref 3.8–5.2)
RBC URINE: 175 /HPF
SODIUM SERPL-SCNC: 136 MMOL/L (ref 135–145)
SP GR UR STRIP: 1 (ref 1–1.03)
SQUAMOUS EPITHELIAL: <1 /HPF
SYSTOLIC BLOOD PRESSURE - MUSE: NORMAL MMHG
T AXIS - MUSE: 31 DEGREES
TROPONIN T SERPL HS-MCNC: 14 NG/L
TSH SERPL DL<=0.005 MIU/L-ACNC: 1.42 UIU/ML (ref 0.3–4.2)
UROBILINOGEN UR STRIP-MCNC: <2 MG/DL
VENTRICULAR RATE- MUSE: 83 BPM
WBC # BLD AUTO: 8.9 10E3/UL (ref 4–11)
WBC URINE: 11 /HPF

## 2024-03-12 PROCEDURE — 83605 ASSAY OF LACTIC ACID: CPT | Performed by: INTERNAL MEDICINE

## 2024-03-12 PROCEDURE — 36415 COLL VENOUS BLD VENIPUNCTURE: CPT | Performed by: INTERNAL MEDICINE

## 2024-03-12 PROCEDURE — 258N000003 HC RX IP 258 OP 636: Performed by: INTERNAL MEDICINE

## 2024-03-12 PROCEDURE — 83735 ASSAY OF MAGNESIUM: CPT | Performed by: INTERNAL MEDICINE

## 2024-03-12 PROCEDURE — 87086 URINE CULTURE/COLONY COUNT: CPT | Performed by: INTERNAL MEDICINE

## 2024-03-12 PROCEDURE — 84145 PROCALCITONIN (PCT): CPT | Performed by: INTERNAL MEDICINE

## 2024-03-12 PROCEDURE — 93010 ELECTROCARDIOGRAM REPORT: CPT | Mod: RTG | Performed by: INTERNAL MEDICINE

## 2024-03-12 PROCEDURE — 84443 ASSAY THYROID STIM HORMONE: CPT | Performed by: INTERNAL MEDICINE

## 2024-03-12 PROCEDURE — 71260 CT THORAX DX C+: CPT | Mod: MG

## 2024-03-12 PROCEDURE — 80053 COMPREHEN METABOLIC PANEL: CPT | Performed by: INTERNAL MEDICINE

## 2024-03-12 PROCEDURE — 85025 COMPLETE CBC W/AUTO DIFF WBC: CPT | Performed by: INTERNAL MEDICINE

## 2024-03-12 PROCEDURE — 258N000003 HC RX IP 258 OP 636: Performed by: UROLOGY

## 2024-03-12 PROCEDURE — 71045 X-RAY EXAM CHEST 1 VIEW: CPT

## 2024-03-12 PROCEDURE — 99418 PROLNG IP/OBS E/M EA 15 MIN: CPT | Performed by: STUDENT IN AN ORGANIZED HEALTH CARE EDUCATION/TRAINING PROGRAM

## 2024-03-12 PROCEDURE — 250N000013 HC RX MED GY IP 250 OP 250 PS 637: Performed by: UROLOGY

## 2024-03-12 PROCEDURE — 99233 SBSQ HOSP IP/OBS HIGH 50: CPT | Performed by: STUDENT IN AN ORGANIZED HEALTH CARE EDUCATION/TRAINING PROGRAM

## 2024-03-12 PROCEDURE — 87040 BLOOD CULTURE FOR BACTERIA: CPT | Performed by: INTERNAL MEDICINE

## 2024-03-12 PROCEDURE — 250N000011 HC RX IP 250 OP 636: Performed by: UROLOGY

## 2024-03-12 PROCEDURE — 82962 GLUCOSE BLOOD TEST: CPT

## 2024-03-12 PROCEDURE — 99223 1ST HOSP IP/OBS HIGH 75: CPT

## 2024-03-12 PROCEDURE — 250N000011 HC RX IP 250 OP 636: Performed by: INTERNAL MEDICINE

## 2024-03-12 PROCEDURE — 120N000001 HC R&B MED SURG/OB

## 2024-03-12 PROCEDURE — 84484 ASSAY OF TROPONIN QUANT: CPT | Performed by: INTERNAL MEDICINE

## 2024-03-12 PROCEDURE — 81001 URINALYSIS AUTO W/SCOPE: CPT | Performed by: INTERNAL MEDICINE

## 2024-03-12 RX ORDER — LEVOFLOXACIN 5 MG/ML
500 INJECTION, SOLUTION INTRAVENOUS EVERY 24 HOURS
Status: DISCONTINUED | OUTPATIENT
Start: 2024-03-13 | End: 2024-03-14 | Stop reason: HOSPADM

## 2024-03-12 RX ORDER — IOPAMIDOL 755 MG/ML
66 INJECTION, SOLUTION INTRAVASCULAR ONCE
Status: COMPLETED | OUTPATIENT
Start: 2024-03-12 | End: 2024-03-12

## 2024-03-12 RX ORDER — LEVOFLOXACIN 5 MG/ML
500 INJECTION, SOLUTION INTRAVENOUS EVERY 24 HOURS
Status: DISCONTINUED | OUTPATIENT
Start: 2024-03-12 | End: 2024-03-12

## 2024-03-12 RX ADMIN — SODIUM CHLORIDE, PRESERVATIVE FREE: 5 INJECTION INTRAVENOUS at 17:27

## 2024-03-12 RX ADMIN — SODIUM CHLORIDE, PRESERVATIVE FREE: 5 INJECTION INTRAVENOUS at 05:06

## 2024-03-12 RX ADMIN — SUCRALFATE 1 G: 1 SUSPENSION ORAL at 11:39

## 2024-03-12 RX ADMIN — LEVOFLOXACIN 500 MG: 500 INJECTION, SOLUTION INTRAVENOUS at 06:24

## 2024-03-12 RX ADMIN — ACETAMINOPHEN 975 MG: 325 TABLET ORAL at 13:02

## 2024-03-12 RX ADMIN — ACETAMINOPHEN 975 MG: 325 TABLET ORAL at 06:21

## 2024-03-12 RX ADMIN — ATORVASTATIN CALCIUM 60 MG: 40 TABLET, FILM COATED ORAL at 20:08

## 2024-03-12 RX ADMIN — OXYCODONE HYDROCHLORIDE 5 MG: 5 TABLET ORAL at 05:45

## 2024-03-12 RX ADMIN — ONDANSETRON 4 MG: 2 INJECTION INTRAMUSCULAR; INTRAVENOUS at 02:17

## 2024-03-12 RX ADMIN — FAMOTIDINE 20 MG: 10 INJECTION, SOLUTION INTRAVENOUS at 03:12

## 2024-03-12 RX ADMIN — HYDROMORPHONE HYDROCHLORIDE 0.4 MG: 0.2 INJECTION, SOLUTION INTRAMUSCULAR; INTRAVENOUS; SUBCUTANEOUS at 16:14

## 2024-03-12 RX ADMIN — INSULIN ASPART 1 UNITS: 100 INJECTION, SOLUTION INTRAVENOUS; SUBCUTANEOUS at 07:49

## 2024-03-12 RX ADMIN — SUCRALFATE 1 G: 1 SUSPENSION ORAL at 07:48

## 2024-03-12 RX ADMIN — PANTOPRAZOLE SODIUM 40 MG: 40 TABLET, DELAYED RELEASE ORAL at 07:48

## 2024-03-12 RX ADMIN — SODIUM CHLORIDE, POTASSIUM CHLORIDE, SODIUM LACTATE AND CALCIUM CHLORIDE 500 ML: 600; 310; 30; 20 INJECTION, SOLUTION INTRAVENOUS at 02:33

## 2024-03-12 RX ADMIN — IOPAMIDOL 66 ML: 755 INJECTION, SOLUTION INTRAVENOUS at 06:04

## 2024-03-12 RX ADMIN — LORAZEPAM 1 MG: 1 TABLET ORAL at 20:07

## 2024-03-12 RX ADMIN — SODIUM CHLORIDE, POTASSIUM CHLORIDE, SODIUM LACTATE AND CALCIUM CHLORIDE 500 ML: 600; 310; 30; 20 INJECTION, SOLUTION INTRAVENOUS at 02:42

## 2024-03-12 ASSESSMENT — ACTIVITIES OF DAILY LIVING (ADL)
ADLS_ACUITY_SCORE: 25

## 2024-03-12 NOTE — PROGRESS NOTES
Labs reviewed.  Lactic acid 3.4.    Plan:  Check blood  UA UC--> levofloxacin  Blood culture  Procalcitonin  Chest x-ray  Ringers lactate 500 cc bolus x 2

## 2024-03-12 NOTE — PROVIDER NOTIFICATION
Notified [place] at 0144 AM regarding changes in vital signs.    Recommendation/request given to provider:  Response from Provider: Provider asked if pt C/o Nausea, pain or vomiting- No- pt is not experiencing any of these symptoms.   were obtained. EKG and TSH ordered.   Comments: Pt HR dropping to mid 40's- All other VSS on RA. Pt asymptomatic.     Provider requested to be notified when results are in. Primary nurse notified.

## 2024-03-12 NOTE — PROGRESS NOTES
"Called about called about decreased heart rate to the 40s status post lithotripsy.  Patient with no current current pain, nausea.        Vitals below vital signs:  Temp: 99.1  F (37.3  C) Temp src: Oral BP: (!) 160/79 Pulse: 61   Resp: 20 SpO2: 98 % O2 Device: Nasal cannula Oxygen Delivery: 1 LPM Height: 160 cm (5' 3\") (stated) Weight: 61.1 kg (134 lb 12.8 oz) (scale)  Estimated body mass index is 23.88 kg/m  as calculated from the following:    Height as of this encounter: 1.6 m (5' 3\").    Weight as of this encounter: 61.1 kg (134 lb 12.8 oz).      No symptoms inpatient.    Plan for now:  EKG stat  Check troponin  Check thyroid function  Check lactic acid.  Bedrest  Will follow       "

## 2024-03-12 NOTE — PROGRESS NOTES
"  [facility] - POSTOP PROGRESS NOTE    PLACE OF SERVICE:  [facility] [place]     SURGERY: POD #1 after left PCNL by Dr Kevin Quinn for left nephrolithiasis     SUBJECTIVE:   Events:  Bradycardia overnight.  Large emesis this AM and CT showing moderate distention of proximal aspect of stomach and mild distention of esophagus with fluid (hx of gastric surgery). No residual stones on CT. LA 3.4 at 0200.    UA RESULTS:  Recent Labs   Lab Test 03/12/24  0238   COLOR Colorless   APPEARANCE Clear   URINEGLC Negative   URINEBILI Negative   URINEKETONE Negative   SG 1.005   UBLD >1.0 mg/dL*   URINEPH 7.5*   PROTEIN Negative   NITRITE Negative   LEUKEST 75 Job/uL*   RBCU 175*   WBCU 11*     UC and BC pending. WBC 8.9. 24 hr Tmax 99.4.  Levaquin initiated this AM.     Pt denies SP pain, bilateral flank pain, fever, chills.     Reports some burning with urination and urinary frequency over the last few days prior to admission and states she did not inform the pre-op team yesterday.     OBJECTIVE:  PHYSICAL EXAM  Vital signs:  Temp: 99.4  F (37.4  C) Temp src: Oral BP: (!) 140/71 Pulse: 95   Resp: 15 SpO2: 97 % O2 Device: None (Room air) Oxygen Delivery: 1 LPM Height: 160 cm (5' 3\") (stated) Weight: 61.1 kg (134 lb 12.8 oz) (scale)  Estimated body mass index is 23.88 kg/m  as calculated from the following:    Height as of this encounter: 1.6 m (5' 3\").    Weight as of this encounter: 61.1 kg (134 lb 12.8 oz).    General: NAD, alert, cooperative  Neuro: A&O x 3. Moving all extremities equally.   Resp: Reg resp rate/depth.   Abdomen: soft, non- distended, no rebound or peritoneal signs. No flank tenderness. Perc site with dry dressing, no palpable hematoma.   : Oliveira draining translucent yellow to faint pink urine, good output.     LABS:  No results found for: \"INR\"   Lab Results   Component Value Date    WBC 8.9 03/12/2024     Lab Results   Component Value Date    HGB 11.6 03/12/2024     Lab Results   Component Value " Date     03/12/2024     Creatinine   Date Value Ref Range Status   03/12/2024 0.96 (H) 0.51 - 0.95 mg/dL Final     Sodium   Date Value Ref Range Status   03/12/2024 136 135 - 145 mmol/L Final     Comment:     Reference intervals for this test were updated on 09/26/2023 to more accurately reflect our healthy population. There may be differences in the flagging of prior results with similar values performed with this method. Interpretation of those prior results can be made in the context of the updated reference intervals.      Potassium   Date Value Ref Range Status   03/12/2024 4.4 3.4 - 5.3 mmol/L Final     Magnesium   Date Value Ref Range Status   03/12/2024 1.8 1.7 - 2.3 mg/dL Final         Lab Results: personally reviewed.     ASSESSMENT/PLAN:  POD #1 after left PCNL by Dr Kevin Quinn for left nephrolithiasis     - Diet - per Surgery recs.   - Creatinine 0.96.   - WBC 8.9. 24 hr Tmax 99.4. BC and UC pending. Received a dose of levaquin IV at 0600 this AM. UTI symptoms prior to admission. Given current ureteral stent, plan to complete 7 day course of antibiotic.   - Catheter - Will remove today and check PVRs.   - Labs ordered - BMP and CBC in AM.   - Activity - encourage ambulation and incentive spirometer   - DVT prophylaxis: SCDs, ambulation.   - Anticipated discharge: 3/13/24 AM      Discussed patient with Dr Kevin Quinn, YIFAN Galeana, PA-C (Gen Surgery) and Dr. Sheikh.      Asif Simpson, APRN, CNP  [facility]   [phone]

## 2024-03-12 NOTE — PROGRESS NOTES
.  Prior gastric surgery. There is mild distention of the esophagus with fluid and moderate distention of the proximal aspect of the stomach with fluid. The portion of the stomach distal to the site of surgery is decompressed. These findings are   suggestive of an obstruction within the stomach at the site of prior surgery.  2.  A trace amount of nonspecific free fluid in the pelvis.  3.  No other acute abnormality identified in the chest, abdomen or pelvis.  4.  A left ureteral stent is in place. Haziness is present within the fat about the left renal pelvis, where there is the proximal portion of the stent. This appearance was also present on 10/30/2023 and therefore is likely a long-standing finding,   possibly relating to chronic inflamman.    Plan:  NPO  General surgery to see

## 2024-03-12 NOTE — PROGRESS NOTES
PRIMARY DIAGNOSIS: Kidney Stone  OUTPATIENT/OBSERVATION GOALS TO BE MET BEFORE DISCHARGE:  1. Pain Status: Improved-controlled with oral pain medications.    2. Return to near baseline physical activity: No    3. Cleared for discharge by consultants (if involved): No    Discharge Planner Nurse   Safe discharge environment identified: No  Barriers to discharge: Yes       Entered by: Therese Freeman RN 03/12/2024 11:45 AM    Patient alert and oriented xx4. Patient refusing meds except stomach soothing one - Protonix and Carafate. Patient remains quite sensitive in her abdominal area. Patient has refused NG tube and wants to see her primary provider. Clear liquids have been ordered for now and patient is agreeable to it. Patient has NS running at 75ml/hr. Surgical consult has been discontinued. Aqua K has been ordered - T pump.  UA showed UTI. No vomiting today.

## 2024-03-12 NOTE — UTILIZATION REVIEW
Admission Status; Secondary Review Determination       Under the authority of the Utilization Management Committee, the utilization review process indicated a secondary review on the above patient. The review outcome is based on review of the medical records, discussions with staff, and applying clinical experience noted on the date of the review.     (x) Inpatient Status Appropriate - This patient's medical care is consistent with medical management for inpatient care and reasonable inpatient medical practice.     RATIONALE FOR DETERMINATION     Ms. Villanueva is a 74 yo female with a PMH of diabetic gastroparesis and prior gastric banding who presented to the hospital for elective left-sided percutaneous nephrolithotomy and cytoscopy with left ureteral stent placement.  Post-op course has been complicated by bradycardia, lactic acidosis and N/V.  CT imaging today with evidence of gastric distension proximal to previous surgical site.  NG tube to LIS to be placed for decompression and NPO.  Gen surgery consulted; requiring further inpatient medical evaluation treatment and clinical monitoring today.       At the time of admission with the information available to the attending physician more than 2 nights Hospital complex care was anticipated, based on patient risk of adverse outcome if treated as outpatient and complex care required. Inpatient admission is appropriate based on the Medicare guidelines.      The information on this document is developed by the utilization review team in order for the business office to ensure compliance. This only denotes the appropriateness of proper admission status and does not reflect the quality of care rendered.   The definitions of Inpatient Status and Observation Status used in making the determination above are those provided in the CMS Coverage Manual, Chapter 1 and Chapter 6, section 70.4.         Sincerely,     Peggy Estrada DO  Utilization Review  Physician  Advisor  [facility].

## 2024-03-12 NOTE — PROGRESS NOTES
PRIMARY DIAGNOSIS Kidney Stone  OUTPATIENT/OBSERVATION GOALS TO BE MET BEFORE DISCHARGE:  Diagnostic test and consults (if applicable) complete: Yes    Vitals signs stable or return to baseline: No - HTN -     Tolerate oral intake to maintain hydration: Yes    Pain status: Improved-controlled with oral pain medications.    Tolerating oral antibiotics or has plans for home infusion setup:  Yes    Return to near baseline physical activity: No    Discharge Planner Nurse   Safe discharge environment identified: No  Barriers to discharge: Yes       Entered by: Therese Freeman RN 03/11/2024 8:36 PM     Patient alert and oriented x4. Patient here for kidney stones. Patient had lithotripsy. Patient will have Oliveira in place for up to 48 hours. Patient is on a regular diet but shared with author she can only have small bits or she vomits. She is contemplating having her gastric bypass reversed. She is on Room Air and not on Tele. She is stand by assist for mobility. Last blood sugar was 269 at 21:28.

## 2024-03-12 NOTE — PROGRESS NOTES
PRIMARY DIAGNOSIS: Kidney Stone  OUTPATIENT/OBSERVATION GOALS TO BE MET BEFORE DISCHARGE:  1. Pain Status: Improved but still requiring IV narcotics.    2. Return to near baseline physical activity: No    3. Cleared for discharge by consultants (if involved): No    Discharge Planner Nurse   Safe discharge environment identified: No  Barriers to discharge: Yes       Entered by: Therese Freeman RN 03/12/2024 5:02 PM     Patient now has NG tube in, very uncomfortable - gave IV Dilaudid to help with anxiety about it. A great deal of stomach contents were removed immediately - one full suction container - 1,000 ml. She is now on her second suction canister on intermittent suction and is still producing quite a bit.

## 2024-03-12 NOTE — CONSULTS
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  This VBG is narrowed too much and the patient is not doing well with this band in place.  She would benefit from having the VBG band removed.         PLAN      Laparoscopic removal of the VBG and endoscopy with dilation of the VBG site.         CHIEF COMPLAINT   Nausea and vomiting        HPI     Dotty Villanueva is a 73 year old female who presents nausea and vomiting.  She had a VBG placed by Dr. Sina Haq in the early 2000's.  She has had troubles with his VBG before in that things have caused obstructions at the site.  She has had a few upper endoscopies with dilations in the last several months.  Last night she had obstruction symptoms after eating a turkey sandwich.  She was evaluated with a CT scan that shows a dramatically dilated gastric pouch above the VBG band.  She was feeling quite bad when she came in but is now feeling better and thinks that some of that fluid is getting passed the obstruction point.         PAST MEDICAL HISTORY SURGICAL HISTORY     Past Medical History:   Diagnosis Date    Anesthesia complication     O2 levels get low    Esophagitis     Essential hypertension     Gastroesophageal reflux disease with esophagitis     History of skin cancer     HTN (hypertension)     Hydronephrosis of left kidney     Hyperlipidemia     Kidney stone     2/20/2024    Malignant neoplasm of skin 07/18/2018    Formatting of this note might be different from the original. 7/2018- left chin, SCCIS- Narrow negative margins. Observe    Osteopenia     Pulmonary nodules 02/18/2016    Formatting of this note might be different from the original. Stable on CT for 24 months, per [place] [facility], no follow up needed 1/2016    Pure hypercholesterolemia     Renal calculi 04/26/2017    Stage 3 chronic kidney disease (H) 10/30/2023    Formatting of this note might be different from the original. 3a    Toxic multinodular goiter  "10/30/2023    Formatting of this note might be different from the original. partially supressed TSH -2003-0.08;0.18 6/07; 0.11 10/07 TSH supressed 12/07 bilateral > 2cm dominant nodules-12/07 on us - negative fna bilateral 1/08 13% uptake with cold nodule right 12/07 51 mCi -1/31/08    Type 2 diabetes mellitus (H)     Vitamin D deficiency     Past Surgical History:   Procedure Laterality Date    APPENDECTOMY      CHOLECYSTECTOMY      ESOPHAGOSCOPY, GASTROSCOPY, DUODENOSCOPY (EGD), COMBINED N/A 11/01/2023    Procedure: ESOPHAGOGASTRODUODENOSCOPY WITH BIOPSY AND BALLOON DILATION;  Surgeon: Mika Leon MD;  Location: [facility] Main OR    HAND CONTRACTURE RELEASE Left     HYSTERECTOMY      Complicated by bowel laceration.  Required colonic repair.    IR NEPHROLITHOTOMY  3/11/2024    LAPAROSCOPIC CONVERTED TO OPEN ABDOMINAL HYSTERECTOMY WITH TRANSVERSE COLON REPAIR      bowel laceration    LAPAROSCOPIC GASTRIC BANDING  2000    LEEP TX, CERVICAL      THYROID BIOPSY      TUBAL LIGATION            CURRENT MEDICATIONS ALLERGIES     No current outpatient medications on file.    Allergies   Allergen Reactions    Enalapril Cough    Morphine Rash     And itching    Penicillins Nausea and Vomiting and Rash          FAMILY HISTORY     Family History   Problem Relation Age of Onset    Diabetes Mother     Heart Disease Father     Heart Disease Brother         x4         SOCIAL HISTORY     Social History     Socioeconomic History    Marital status: Single   Tobacco Use    Smoking status: Never    Smokeless tobacco: Never   Substance and Sexual Activity    Alcohol use: Yes     Comment: Rare         REVIEW OF SYSTEMS       12 point review of systems are unremarkable except for the symptoms described in the HPI    PHYSICAL EXAM     VITAL SIGNS: BP (!) 162/78 (BP Location: Left arm)   Pulse 95   Temp 99.1  F (37.3  C) (Oral)   Resp 16   Ht 1.6 m (5' 3\")   Wt 61.1 kg (134 lb 12.8 oz)   SpO2 97%   BMI 23.88 kg/m     General : " Alert, cooperative, appears stated age   Skin: Skin color, texture, turgor normal, no rashes or lesions   Lymph nodes: No obvious adenopathy   Head: Normocephalic, without obvious abnormality, atraumatic   HEENT:  conjunctiva/corneas clear, EOM's intact, no scleral icterus   Throat: Lips, mucosa, and tongue normal; teeth and gums normal    Neck: Supple, thyroid not enlarged   Back: No CVA tenderness   Lungs: Clear to auscultation bilaterally, respirations unlabored, no rales, rhonchi or wheezes   Chest Wall:No tenderness or deformity   Heart: Regular rate and rhythm, S1, S2 normal, no murmur, rub or gallop   Abdomen: Soft, non-tender, no guarding, + bowel sounds active,   Extremities : No obvious swelling,  Neurologic: Cranial Nerves II-XII normal, moves all extremities equally, no focal findings          RADIOLOGY      CT Chest/Abdomen/Pelvis w Contrast   Final Result   IMPRESSION:    1.  Prior gastric surgery. There is mild distention of the esophagus with fluid and moderate distention of the proximal aspect of the stomach with fluid. The portion of the stomach distal to the site of surgery is decompressed. These findings are    suggestive of an obstruction within the stomach at the site of prior surgery.   2.  A trace amount of nonspecific free fluid in the pelvis.   3.  No other acute abnormality identified in the chest, abdomen or pelvis.   4.  A left ureteral stent is in place. Haziness is present within the fat about the left renal pelvis, where there is the proximal portion of the stent. This appearance was also present on 10/30/2023 and therefore is likely a long-standing finding,    possibly relating to chronic inflammation.                           XR Chest Port 1 View   Final Result   IMPRESSION: No evidence of active cardiopulmonary disease.       XR Surgery KIRA G/T 5 Min Fluoro   Final Result   IMPRESSION:    1.  Successful percutaneous nephrostomy access for percutaneous nephrolithotomy, as  discussed above.    2.  Percutaneous nephrolithotomy. Please refer to the dedicated urologist procedure note for specific details.    3.  Successful antegrade placement of a ureteral stent which fluoroscopically is in appropriate position.       PLAN:   1. Patient to be recovered in PACU.   2. Remaining cares as per Urology.             EKG     Reviewed        LABS     Results for orders placed or performed during the hospital encounter of 03/11/24   XR Surgery KIRA G/T 5 Min Fluoro    Impression    IMPRESSION:   1.  Successful percutaneous nephrostomy access for percutaneous nephrolithotomy, as discussed above.   2.  Percutaneous nephrolithotomy. Please refer to the dedicated urologist procedure note for specific details.   3.  Successful antegrade placement of a ureteral stent which fluoroscopically is in appropriate position.     PLAN:  1. Patient to be recovered in PACU.  2. Remaining cares as per Urology.     XR Chest Port 1 View    Impression    IMPRESSION: No evidence of active cardiopulmonary disease.    CT Chest/Abdomen/Pelvis w Contrast    Impression    IMPRESSION:   1.  Prior gastric surgery. There is mild distention of the esophagus with fluid and moderate distention of the proximal aspect of the stomach with fluid. The portion of the stomach distal to the site of surgery is decompressed. These findings are   suggestive of an obstruction within the stomach at the site of prior surgery.  2.  A trace amount of nonspecific free fluid in the pelvis.  3.  No other acute abnormality identified in the chest, abdomen or pelvis.  4.  A left ureteral stent is in place. Haziness is present within the fat about the left renal pelvis, where there is the proximal portion of the stent. This appearance was also present on 10/30/2023 and therefore is likely a long-standing finding,   possibly relating to chronic inflammation.                   Extra Green Top Tube (LAB USE ONLY)   Result Value Ref Range    Hold Specimen  [facility]    Extra Purple Top EDTA (LAB USE ONLY)   Result Value Ref Range    Hold Specimen [facility]    Glucose by meter   Result Value Ref Range    GLUCOSE BY METER POCT 79 70 - 99 mg/dL   Glucose by meter   Result Value Ref Range    GLUCOSE BY METER POCT 88 70 - 99 mg/dL   Glucose by meter   Result Value Ref Range    GLUCOSE BY METER POCT 132 (H) 70 - 99 mg/dL   Glucose by meter   Result Value Ref Range    GLUCOSE BY METER POCT 269 (H) 70 - 99 mg/dL   TSH with free T4 reflex   Result Value Ref Range    TSH 1.42 0.30 - 4.20 uIU/mL   Lactic acid whole blood   Result Value Ref Range    Lactic Acid 3.4 (H) 0.7 - 2.0 mmol/L   Result Value Ref Range    Troponin T, High Sensitivity 14 <=14 ng/L   Comprehensive metabolic panel   Result Value Ref Range    Sodium 136 135 - 145 mmol/L    Potassium 4.4 3.4 - 5.3 mmol/L    Carbon Dioxide (CO2) 24 22 - 29 mmol/L    Anion Gap 9 7 - 15 mmol/L    Urea Nitrogen 11.4 8.0 - 23.0 mg/dL    Creatinine 0.96 (H) 0.51 - 0.95 mg/dL    GFR Estimate 62 >60 mL/min/1.73m2    Calcium 10.1 8.8 - 10.2 mg/dL    Chloride 103 98 - 107 mmol/L    Glucose 202 (H) 70 - 99 mg/dL    Alkaline Phosphatase 64 40 - 150 U/L    AST 11 0 - 45 U/L    ALT 9 0 - 50 U/L    Protein Total 5.8 (L) 6.4 - 8.3 g/dL    Albumin 3.1 (L) 3.5 - 5.2 g/dL    Bilirubin Total 0.5 <=1.2 mg/dL   UA with Microscopic reflex to Culture    Specimen: Urine, Oliveira Catheter   Result Value Ref Range    Color Urine Colorless Colorless, Straw, Light Yellow, Yellow    Appearance Urine Clear Clear    Glucose Urine Negative Negative mg/dL    Bilirubin Urine Negative Negative    Ketones Urine Negative Negative mg/dL    Specific Gravity Urine 1.005 1.001 - 1.030    Blood Urine >1.0 mg/dL (A) Negative    pH Urine 7.5 (H) 5.0 - 7.0    Protein Albumin Urine Negative Negative mg/dL    Urobilinogen Urine <2.0 <2.0 mg/dL    Nitrite Urine Negative Negative    Leukocyte Esterase Urine 75 Job/uL (A) Negative    Bacteria Urine Few (A) None Seen /HPF    Mucus Urine  Present (A) None Seen /LPF    Amorphous Crystals Urine Few (A) None Seen /HPF    RBC Urine 175 (H) <=2 /HPF    WBC Urine 11 (H) <=5 /HPF    Squamous Epithelials Urine <1 <=1 /HPF    Hyaline Casts Urine 6 (H) <=2 /LPF   Result Value Ref Range    Magnesium 1.8 1.7 - 2.3 mg/dL   Result Value Ref Range    Procalcitonin 0.03 <0.50 ng/mL   Lactic acid whole blood   Result Value Ref Range    Lactic Acid 3.3 (H) 0.7 - 2.0 mmol/L   CBC with platelets and differential   Result Value Ref Range    WBC Count 8.9 4.0 - 11.0 10e3/uL    RBC Count 4.05 3.80 - 5.20 10e6/uL    Hemoglobin 11.6 (L) 11.7 - 15.7 g/dL    Hematocrit 36.2 35.0 - 47.0 %    MCV 89 78 - 100 fL    MCH 28.6 26.5 - 33.0 pg    MCHC 32.0 31.5 - 36.5 g/dL    RDW 13.2 10.0 - 15.0 %    Platelet Count 405 150 - 450 10e3/uL    % Neutrophils 84 %    % Lymphocytes 8 %    % Monocytes 7 %    % Eosinophils 0 %    % Basophils 0 %    % Immature Granulocytes 1 %    NRBCs per 100 WBC 0 <1 /100    Absolute Neutrophils 7.5 1.6 - 8.3 10e3/uL    Absolute Lymphocytes 0.8 0.8 - 5.3 10e3/uL    Absolute Monocytes 0.6 0.0 - 1.3 10e3/uL    Absolute Eosinophils 0.0 0.0 - 0.7 10e3/uL    Absolute Basophils 0.0 0.0 - 0.2 10e3/uL    Absolute Immature Granulocytes 0.1 <=0.4 10e3/uL    Absolute NRBCs 0.0 10e3/uL   Glucose by meter   Result Value Ref Range    GLUCOSE BY METER POCT 170 (H) 70 - 99 mg/dL   ECG 12-LEAD WITH MUSE ([facility])   Result Value Ref Range    Systolic Blood Pressure  mmHg    Diastolic Blood Pressure  mmHg    Ventricular Rate 83 BPM    Atrial Rate 83 BPM    IN Interval 200 ms    QRS Duration 100 ms     ms    QTc 462 ms    P Axis 56 degrees    R AXIS -40 degrees    T Axis 31 degrees    Interpretation ECG       Sinus rhythm with occasional Premature ventricular complexes and Fusion complexes  Left axis deviation  Minimal voltage criteria for LVH, may be normal variant  Abnormal ECG  When compared with ECG of 25-APR-2023 10:35,  Fusion complexes are now Present  Premature  ventricular complexes are now Present  Criteria for Septal infarct are no longer Present     Adult Type and Screen   Result Value Ref Range    ABO/RH(D) O POS     Antibody Screen Negative Negative    SPECIMEN EXPIRATION DATE [id number]            [facility]  [phone]

## 2024-03-12 NOTE — PROGRESS NOTES
[facility]  Consult Note - Hospitalist Service  Date of Admission:  3/11/2024  Consult Requested by: Dr. Quinn  Reason for Consult: Diabetes management    Assessment & Plan   Dotty Villanueva is a 73 year old female with PMHx of DM2 c/b gastroparesis, gastric banding in 2000, HTN, vitamin D deficiency, nephrolithiasis who presents for post-op admission after a left-sided percutaneous nephrolithotomy and cystoscopy with left ureteral stent placed. Now undergoing chronic lap band removal.    #Severe gastroparesis  #History of gastric lap band  Gastric lap band placed 2000. The patient reports living on soups and toast for at least the past year due to difficulty with N/V when eating regular foods. She reports losing significant weight during this time. Was admitted at [facility] 11/2023 for these symptoms. 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 Ultimately the patient's lap band is now obstructing the flow of food through her stomach. She may not have gastroparesis as the lap band may have been confounding the results.  - N/V post-op 3/11 led to surgery consult; Dr. Mabry is planning for lap band removal 3/13  - NGT for decompression pre-op to reduce risk of aspiration with anesthesia  - Plan for intra-op EGD as well per surgery  - NPO  - Will take over primary from Urology; patient's gastric obstruction is unrelated to her recent Urologic procedure    #Left-sided nephrolithiasis  Underwent percutaneous nephrolithotomy and cystoscopy with left ureteral stent placement on 3/11/24 with Urology. Doing well post-op.  - Urology following; appreciate expertise  - Oliveira per Urology, can likely remove post-op 3/13  - Pain controlled    #UTI  Per Urology, would treat based on recent UA results and dysuria symptoms she was experiencing prior to admission.  - Levofloxacin x7 days    #Lactic acidosis  Asymptomatic. Mild,  probably due to recent surgery. Resolved with IVF. Perfusing well.    #Type 2 DM without long-term insulin use  Well controlled.  - Hold hold glyburide to avoid hypoglycemia  - Sliding scale insulin added, but unlikely to need much, goal pre-prandial BG <180  - Continue home atorvastatin    #Anxiety  PDMP reviewed, recently prescribed Ativan 2mg q6h PRN, a hefty dose for this patient, but should continue.  - Continue home Ativan, but hold or reduce dose of any signs of sedation    #HTN  - Continue lisinopril as substituted for home benazepril, hold 3/13 AM, can give post-op  - Continue home amlodipine    #GERD  History of esophagitis  - Continue home PPI BID, sucralfate         Clinically Significant Risk Factors Present on Admission           # Hypercalcemia: corrected calcium is >10.1, will monitor as appropriate    # Hypoalbuminemia: Lowest albumin = 3.1 g/dL at 3/12/2024  3:12 AM, will monitor as appropriate   # Drug Induced Platelet Defect: home medication list includes an antiplatelet medication   # Hypertension: Noted on problem list     # DMII: A1C = N/A within past 6 months               THADDEUS MCMAHON MD  Hospitalist Service  Securely message with [employer] (more info)  Text page via [employer] Paging/Directory   ______________________________________________________________________    Interval history:  Overnight vomited due to gastroparesis and known gastric lap band obstruction. Feeling much better this morning.      Past Medical History    Past Medical History:   Diagnosis Date    Anesthesia complication     O2 levels get low    Esophagitis     Essential hypertension     Gastroesophageal reflux disease with esophagitis     History of skin cancer     HTN (hypertension)     Hydronephrosis of left kidney     Hyperlipidemia     Kidney stone     2/20/2024    Malignant neoplasm of skin 07/18/2018    Formatting of this note might be different from the original. 7/2018- left chin, SCCIS- Narrow negative margins.  Observe    Osteopenia     Pulmonary nodules 02/18/2016    Formatting of this note might be different from the original. Stable on CT for 24 months, per [place] [facility], no follow up needed 1/2016    Pure hypercholesterolemia     Renal calculi 04/26/2017    Stage 3 chronic kidney disease (H) 10/30/2023    Formatting of this note might be different from the original. 3a    Toxic multinodular goiter 10/30/2023    Formatting of this note might be different from the original. partially supressed TSH -2003-0.08;0.18 6/07; 0.11 10/07 TSH supressed 12/07 bilateral > 2cm dominant nodules-12/07 on us - negative fna bilateral 1/08 13% uptake with cold nodule right 12/07 51 mCi -1/31/08    Type 2 diabetes mellitus (H)     Vitamin D deficiency        Past Surgical History   Past Surgical History:   Procedure Laterality Date    APPENDECTOMY      CHOLECYSTECTOMY      ESOPHAGOSCOPY, GASTROSCOPY, DUODENOSCOPY (EGD), COMBINED N/A 11/01/2023    Procedure: ESOPHAGOGASTRODUODENOSCOPY WITH BIOPSY AND BALLOON DILATION;  Surgeon: Mika Leon MD;  Location: [facility] Main OR    HAND CONTRACTURE RELEASE Left     HYSTERECTOMY      Complicated by bowel laceration.  Required colonic repair.    IR NEPHROLITHOTOMY  3/11/2024    LAPAROSCOPIC CONVERTED TO OPEN ABDOMINAL HYSTERECTOMY WITH TRANSVERSE COLON REPAIR      bowel laceration    LAPAROSCOPIC GASTRIC BANDING  2000    LEEP TX, CERVICAL      THYROID BIOPSY      TUBAL LIGATION         Medications   I have reviewed this patient's current medications  Medications Prior to Admission   Medication Sig Dispense Refill Last Dose    amLODIPine (NORVASC) 5 MG tablet Take 5 mg by mouth daily   3/10/2024 at am    aspirin (ASA) 81 MG chewable tablet Take 81 mg by mouth daily   Past Month at held for 2 weeks am    atorvastatin (LIPITOR) 20 MG tablet Take 60 mg by mouth every evening   Past Month at held for 2 weeks am    benazepril (LOTENSIN) 40 MG tablet Take 40 mg by mouth daily   3/10/2024 at am     calcium carbonate (OS-VONNIE) 1500 (600 Ca) MG tablet Take 1 tablet by mouth daily   Past Month at held for 2 weeks am    calcium-magnesium (CALMAG) 500-250 MG TABS per tablet Take 1 tablet by mouth daily (before supper)   Past Month at held for 2 weeks am    cholecalciferol (VITAMIN D3) 125 mcg (5000 units) capsule Take 250 mcg by mouth daily (before supper)   Past Month at held for 2 weeks am    cinnamon 500 MG CAPS Take 1 tablet by mouth daily   Past Month at held for 2 weeks am    co-enzyme Q-10 100 MG CAPS capsule Take 100 mg by mouth daily (before supper)   Past Month at held for 2 weeks am    cyanocobalamin (CYANOCOBALAMIN) 1000 MCG/ML injection Inject 1 mL into the muscle every 7 days   Past Week at 3/6/24    erythromycin (ROMYCIN) 5 MG/GM ophthalmic ointment Apply 1 strip to eye as needed Erythromycin ophthalmic ointment 0.05% apply 1 strip to both eyes 6 times a daily prn   More than a month at prn    fluticasone (FLONASE) 50 MCG/ACT nasal spray Spray 1 spray into both nostrils daily as needed   3/10/2024 at am    glimepiride (AMARYL) 2 MG tablet Take 2 mg by mouth daily (before lunch)   3/10/2024 at late morning    LORazepam (ATIVAN) 2 MG tablet Take 2 mg by mouth every 6 hours as needed for anxiety   Past Week at prn    melatonin 1 MG TABS tablet Take 1 mg by mouth nightly as needed for sleep   Past Week at prn    Multiple Vitamin (MULTIVITAMIN ADULT PO) Take 1 tablet by mouth daily   Past Month at held for 2 weeks am    ondansetron (ZOFRAN ODT) 4 MG ODT tab Take 1 tablet (4 mg) by mouth every 6 hours as needed for nausea or vomiting 30 tablet 0 Past Week at prn    pantoprazole (PROTONIX) 40 MG EC tablet Take 1 tablet (40 mg) by mouth 2 times daily (before meals) 60 tablet 0 3/10/2024 at pm    potassium chloride (KLOR-CON) 20 MEQ packet Take 20 mEq by mouth 2 times daily   Past Week at am    sucralfate (CARAFATE) 1 GM/10ML suspension Take 1 g by mouth 3 times daily (before meals)   Past Week    traZODone  (DESYREL) 100 MG tablet Take 100 mg by mouth nightly as needed for sleep   Past Week at pm             Physical Exam   Vital Signs: Temp: 98.7  F (37.1  C) Temp src: Oral BP: (!) 178/77 Pulse: 95   Resp: 17 SpO2: 97 % O2 Device: None (Room air)    Weight: 134 lbs 12.8 oz    General Appearance: NAD, well appearing  Respiratory: CTAB  Cardiovascular: RRR. No m/r/g  GI: Soft. NT/ND  Skin: No rashes no lower extremities  Ext: No LE edema    Medical Decision Making       80 MINUTES SPENT BY ME on the date of service doing chart review, history, exam, documentation & further activities per the note.      Data     I have personally reviewed the following data over the past 24 hrs:    8.9  \   11.6 (L)   / 405     136 103 11.4 /  108 (H)   4.4 24 0.96 (H) \     ALT: 9 AST: 11 AP: 64 TBILI: 0.5   ALB: 3.1 (L) TOT PROTEIN: 5.8 (L) LIPASE: N/A     Trop: 14 BNP: N/A     TSH: 1.42 T4: N/A A1C: N/A     Procal: 0.03 CRP: N/A Lactic Acid: 1.7         Imaging results reviewed over the past 24 hrs:   Recent Results (from the past 24 hour(s))   XR Chest Port 1 View    Narrative    EXAM: CHEST 2 VIEWS  LOCATION: [facility]  DATE: 3/12/2024    INDICATION: Indigestion and bradycardia.  COMPARISON: None.    FINDINGS: A small band-like opacity at the lateral aspect of the left lung base was present previously and likely represents scarring. The lungs are otherwise clear. Normal size cardiac silhouette. Atherosclerotic calcification in the thoracic aorta.   Surgical clips in the upper right hemiabdomen likely relate to prior cholecystectomy.      Impression    IMPRESSION: No evidence of active cardiopulmonary disease.    CT Chest/Abdomen/Pelvis w Contrast    Narrative    EXAM: CT CHEST, ABDOMEN AND PELVIS WITH CONTRAST  LOCATION: [facility]  DATE/TIME: 3/12/2024 6:10 AM CDT    INDICATION: Indigestion. Bradycardia. Question sepsis.  COMPARISON:  10/30/2023 - CT abdomen and pelvis.  10/26/2015  - CT chest, abdomen and pelvis.    TECHNIQUE: CT scan of the chest, abdomen, and pelvis was performed following injection of IV contrast. Multiplanar reformats were obtained. Dose reduction techniques were used.  CONTRAST: 66 mL Isovue 370.    FINDINGS:    LUNGS AND PLEURA: 0.6 cm nodule in the anterior aspect of the left upper lobe (series 4 image 111), unchanged since 10/26/2015 and therefore consistent with benign etiology.    MEDIASTINUM/AXILLAE: A 4.2 x 2.9 cm calcified left lobe of thyroid nodule again noted. Atherosclerotic calcification in the aorta.    CORONARY ARTERY CALCIFICATION: Not definitely present.    HEPATOBILIARY: Prior cholecystectomy.    SPLEEN: Unremarkable.    PANCREAS: Mild to moderate diffuse parenchymal atrophy.    ADRENAL GLANDS: Unremarkable.    KIDNEYS/BLADDER: No suspicious renal lesions. A left ureteral stent is present with proximal pigtail in the renal pelvis and distal pigtail in the urinary bladder lumen. No dilatation of the intrarenal collecting systems or ureters. Haziness is present   within the left peripelvic fat, similar in appearance to the comparison study dated 10/30/2023. A balloon tip catheter is present in the urinary bladder lumen.    BOWEL: The esophagus is mildly distended with fluid. Prior gastric surgery. Moderate distention of the proximal aspect of the stomach with fluid and a small amount of gas. The stomach distal to the site of surgery is decompressed. The duodenum, jejunum,   ileum and colon are normal in caliber. A few colonic diverticula are present without evidence of diverticulitis. The appendix is not visualized. No bowel wall thickening, pneumatosis or free intraperitoneal gas.    LYMPH NODES: No acute findings.    PELVIC ORGANS: No acute findings.    MUSCULOSKELETAL: No acute findings.    OTHER: A trace amount of free fluid in the pelvis.      Impression    IMPRESSION:   1.  Prior gastric surgery. There is mild distention of the esophagus with fluid  and moderate distention of the proximal aspect of the stomach with fluid. The portion of the stomach distal to the site of surgery is decompressed. These findings are   suggestive of an obstruction within the stomach at the site of prior surgery.  2.  A trace amount of nonspecific free fluid in the pelvis.  3.  No other acute abnormality identified in the chest, abdomen or pelvis.  4.  A left ureteral stent is in place. Haziness is present within the fat about the left renal pelvis, where there is the proximal portion of the stent. This appearance was also present on 10/30/2023 and therefore is likely a long-standing finding,   possibly relating to chronic inflammation.

## 2024-03-12 NOTE — PROGRESS NOTES
PRIMARY DIAGNOSIS: Kidney stone  OUTPATIENT/OBSERVATION GOALS TO BE MET BEFORE DISCHARGE:  ADLs back to baseline: No    Activity and level of assistance: Up with standby assistance.    Pain status: Improved-controlled with oral pain medications.    Return to near baseline physical activity: No     Discharge Planner Nurse   Safe discharge environment identified: No  Barriers to discharge: Yes       Entered by: Shelli Knox RN 03/12/2024 5:44 AM   A&O x 4, SBA to commode. Lactic acid now 3.3 from 3.4, provider notified. Start IV antibiotic. Episode of vomiting x 3, declined PRN Compazine. Gave chicken broth. PRN Oxycodone given before going to CT for abdominal pain. Pending blood cultures and CT results. Infusing NS 75 ml/hr.  Please review provider order for any additional goals.   Nurse to notify provider when observation goals have been met and patient is ready for discharge.

## 2024-03-12 NOTE — PROGRESS NOTES
PRIMARY DIAGNOSIS: Kidney stone  OUTPATIENT/OBSERVATION GOALS TO BE MET BEFORE DISCHARGE:  ADLs back to baseline: No    Activity and level of assistance: Up with standby assistance.    Pain status: Pain free.    Return to near baseline physical activity: No     Discharge Planner Nurse   Safe discharge environment identified: No  Barriers to discharge: Yes       Entered by: Shelli Knox RN 03/12/2024 3:22 AM  Pt A&O x4, denies pain. HR dropping to mid 40's, provider notified. C/o acid reflux. Zofran and Pepcid given. LR bolus given x 2. Lactic acid 3.4. Waiting for chest xray and urine culture results. Oliveira cath draining clear output.   Please review provider order for any additional goals.   Nurse to notify provider when observation goals have been met and patient is ready for discharge.

## 2024-03-12 NOTE — CONSULTS
General Surgery Consultation  Dotty Villanueva MRN# [id number]   Age/Sex: 73 year old female YOB: 1950     Reason for consult: No diagnosis found.        Requesting physician: Dr. Tierney                  Assessment and Plan:   Assessment:  Dotty Villanueva is a 73-year-old female with PMH nephrolithiasis, gastric band in 2000, gastroparesis seen by [facility], DM type II, hypertension presenting for left percutaneous nephrolithotomy with urology.  Postoperative course complicated by low heart rate, lactic 3.4, nausea and vomiting with inability to tolerate much diet. Max temp 99.4F today, hypertensive, stable HR, no leukocytosis, lactic 3.4>3.3>1.7. CT 3/12/24 with distention of stomach proximal to previous surgical site. Plan for decompression with NG tubing and further discussion with Dr. Haile for potential future surgery.    Plan:  -NPO  -NG to LIS for decompression of stomach  -Activity as tolerated  -Pain / Nausea management  -Medical management by primary team          Chief Complaint:   No chief complaint on file.       History is obtained from the patient    HPI:   Dotty Villanueva is a 73 year old female who presents for nephrolitomoy for kidney stones but states she had low heart rate and nausea postop. Patient states she has had this before postop and resolved. She ate half of a turkey sandwich and states her stomach hurt a lot afterward and became very nauseated with vomiting. This is the most severe abdominal pain she has had.    PMH as above. Patient has had conversations with [facility] regarding gastroparesis. Is only able to drink liquids and soups well, sometimes tolerates other foods but not large meals. Had discussions regarding removing the gastric band but had not met with any specialty or set a surgery date as of yet. Would like it removed if possible.           Past Medical History:     Past Medical History:   Diagnosis Date    Anesthesia complication     O2 levels get low    Esophagitis      Essential hypertension     Gastroesophageal reflux disease with esophagitis     History of skin cancer     HTN (hypertension)     Hydronephrosis of left kidney     Hyperlipidemia     Kidney stone     2/20/2024    Malignant neoplasm of skin 07/18/2018    Formatting of this note might be different from the original. 7/2018- left chin, SCCIS- Narrow negative margins. Observe    Osteopenia     Pulmonary nodules 02/18/2016    Formatting of this note might be different from the original. Stable on CT for 24 months, per [place] [facility], no follow up needed 1/2016    Pure hypercholesterolemia     Renal calculi 04/26/2017    Stage 3 chronic kidney disease (H) 10/30/2023    Formatting of this note might be different from the original. 3a    Toxic multinodular goiter 10/30/2023    Formatting of this note might be different from the original. partially supressed TSH -2003-0.08;0.18 6/07; 0.11 10/07 TSH supressed 12/07 bilateral > 2cm dominant nodules-12/07 on us - negative fna bilateral 1/08 13% uptake with cold nodule right 12/07 51 mCi -1/31/08    Type 2 diabetes mellitus (H)     Vitamin D deficiency               Past Surgical History:     Past Surgical History:   Procedure Laterality Date    APPENDECTOMY      CHOLECYSTECTOMY      ESOPHAGOSCOPY, GASTROSCOPY, DUODENOSCOPY (EGD), COMBINED N/A 11/01/2023    Procedure: ESOPHAGOGASTRODUODENOSCOPY WITH BIOPSY AND BALLOON DILATION;  Surgeon: Mika Leon MD;  Location: [facility] Main OR    HAND CONTRACTURE RELEASE Left     HYSTERECTOMY      Complicated by bowel laceration.  Required colonic repair.    IR NEPHROLITHOTOMY  3/11/2024    LAPAROSCOPIC CONVERTED TO OPEN ABDOMINAL HYSTERECTOMY WITH TRANSVERSE COLON REPAIR      bowel laceration    LAPAROSCOPIC GASTRIC BANDING  2000    LEEP TX, CERVICAL      THYROID BIOPSY      TUBAL LIGATION               Social History:    reports that she has never smoked. She has never used smokeless tobacco. She reports current alcohol use.            Family History:     Family History   Problem Relation Age of Onset    Diabetes Mother     Heart Disease Father     Heart Disease Brother         x4              Allergies:     Allergies   Allergen Reactions    Enalapril Cough    Morphine Rash     And itching    Penicillins Nausea and Vomiting and Rash              Medications:     Prior to Admission medications    Medication Sig Start Date End Date Taking? Authorizing Provider   amLODIPine (NORVASC) 5 MG tablet Take 5 mg by mouth daily   Yes Unknown, Entered By History   aspirin (ASA) 81 MG chewable tablet Take 81 mg by mouth daily   Yes Unknown, Entered By History   atorvastatin (LIPITOR) 20 MG tablet Take 60 mg by mouth every evening   Yes Unknown, Entered By History   benazepril (LOTENSIN) 40 MG tablet Take 40 mg by mouth daily   Yes Unknown, Entered By History   calcium carbonate (OS-VONNIE) 1500 (600 Ca) MG tablet Take 1 tablet by mouth daily   Yes Reported, Patient   calcium-magnesium (CALMAG) 500-250 MG TABS per tablet Take 1 tablet by mouth daily (before supper)   Yes Unknown, Entered By History   cholecalciferol (VITAMIN D3) 125 mcg (5000 units) capsule Take 250 mcg by mouth daily (before supper)   Yes Unknown, Entered By History   cinnamon 500 MG CAPS Take 1 tablet by mouth daily   Yes Reported, Patient   co-enzyme Q-10 100 MG CAPS capsule Take 100 mg by mouth daily (before supper)   Yes Unknown, Entered By History   cyanocobalamin (CYANOCOBALAMIN) 1000 MCG/ML injection Inject 1 mL into the muscle every 7 days   Yes Unknown, Entered By History   erythromycin (ROMYCIN) 5 MG/GM ophthalmic ointment Apply 1 strip to eye as needed Erythromycin ophthalmic ointment 0.05% apply 1 strip to both eyes 6 times a daily prn   Yes Reported, Patient   fluticasone (FLONASE) 50 MCG/ACT nasal spray Spray 1 spray into both nostrils daily as needed   Yes Reported, Patient   glimepiride (AMARYL) 2 MG tablet Take 2 mg by mouth daily (before lunch)   Yes Unknown, Entered By  History   LORazepam (ATIVAN) 2 MG tablet Take 2 mg by mouth every 6 hours as needed for anxiety   Yes Reported, Patient   melatonin 1 MG TABS tablet Take 1 mg by mouth nightly as needed for sleep   Yes Unknown, Entered By History   Multiple Vitamin (MULTIVITAMIN ADULT PO) Take 1 tablet by mouth daily   Yes Reported, Patient   ondansetron (ZOFRAN ODT) 4 MG ODT tab Take 1 tablet (4 mg) by mouth every 6 hours as needed for nausea or vomiting 11/2/23  Yes Yakov Hidalgo MD   pantoprazole (PROTONIX) 40 MG EC tablet Take 1 tablet (40 mg) by mouth 2 times daily (before meals) 11/2/23  Yes Yakov Hidalgo MD   potassium chloride (KLOR-CON) 20 MEQ packet Take 20 mEq by mouth 2 times daily   Yes Unknown, Entered By History   sucralfate (CARAFATE) 1 GM/10ML suspension Take 1 g by mouth 3 times daily (before meals)   Yes Unknown, Entered By History   traZODone (DESYREL) 100 MG tablet Take 100 mg by mouth nightly as needed for sleep   Yes Unknown, Entered By History              Review of Systems:   The Review of Systems is negative other than noted in the HPI            Physical Exam:   Patient Vitals for the past 24 hrs:   BP Temp Temp src Pulse Resp SpO2   03/12/24 0659 (!) 162/78 99.1  F (37.3  C) Oral 95 16 97 %   03/12/24 0309 (!) 174/81 98  F (36.7  C) Oral 74 16 --   03/11/24 2349 (!) 160/79 99.1  F (37.3  C) Oral 61 20 98 %   03/11/24 1909 (!) 173/70 97.5  F (36.4  C) Oral -- 18 --   03/11/24 1908 -- -- Oral -- -- --   03/11/24 1506 (!) 149/68 97.4  F (36.3  C) Oral -- 18 --   03/11/24 1500 (!) 153/72 -- -- -- -- --   03/11/24 1423 -- -- -- 73 -- 97 %   03/11/24 1400 (!) 147/73 -- -- 65 -- 95 %   03/11/24 1300 (!) 155/82 -- -- 74 -- 98 %   03/11/24 1200 139/77 -- -- 69 -- 98 %   03/11/24 1128 (!) 152/68 -- -- 70 -- 96 %   03/11/24 1123 (!) 171/75 -- -- 77 18 99 %   03/11/24 1100 (!) 150/76 98  F (36.7  C) Temporal 73 13 98 %   03/11/24 1045 (!) 152/73 -- -- 70 14 99 %   03/11/24 1030 (!) 151/72 -- -- 73 13 100 %    03/11/24 1015 (!) 155/72 -- -- 79 13 100 %   03/11/24 1000 (!) 161/78 98.1  F (36.7  C) Temporal 91 18 100 %   03/11/24 0958 (!) 171/81 -- -- 93 13 100 %          Intake/Output Summary (Last 24 hours) at 3/12/2024 0910  Last data filed at 3/12/2024 0659  Gross per 24 hour   Intake 1521 ml   Output 3600 ml   Net -2079 ml      Constitutional:   Awake, alert, cooperative, no apparent distress, and appears stated age, pleasant       Eyes:   conjunctiva/corneas clear, EOM's intact; no scleral edema or icterus noted        ENT:   Normocephalic, without obvious abnormality, atraumatic, Lips, mucosa, and tongue normal        Lungs:   Normal respiratory effort, no accessory muscle use       Abdomen:   Soft, nondistended abdomen, tenderness with palpation over the epigastric area though generally nontender with palpation and without guarding or peritoneal signs. Appropriately tender to left side/ back palpation       Musculoskeletal:   No obvious swelling, bruising or deformity       Skin:   Skin color and texture normal for patient, no rashes or lesions              Data:         All imaging studies reviewed by me.    Results for orders placed or performed during the hospital encounter of 03/11/24 (from the past 24 hour(s))   XR Surgery KIRA G/T 5 Min Fluoro    Narrative    [facility]  LOCATION: [facility]  DATE: 3/11/2024    PROCEDURE: LEFT PERCUTANEOUS NEPHROLITHOTOMY PERFORMED IN CONJUNCTION WITH UROLOGY    INTERVENTIONAL RADIOLOGIST: AMBER Diallo MD.  UROLOGIST: Dr. Kai MD.    INDICATION: Large left renal pelvic stone.    SEDATION: General endotracheal anesthesia.    CONTRAST: 50 mL Omnipaque into the urinary system.  ANTIBIOTICS: Per anesthesia.  ADDITIONAL MEDICATIONS: None.    FLUOROSCOPIC TIME: 16.6 minutes.  RADIATION DOSE: Air Kerma: 158.3 mGy.    COMPLICATIONS: No immediate complications.    STERILE BARRIER TECHNIQUE: Maximum sterile barrier technique was used.  "Cutaneous antisepsis was performed at the operative site followed by placement of a large standard sterile drape. Prior to the procedure, the operators and assistants performed hand   hygiene and wore hat, mask, sterile gown, and sterile gloves during the entire procedure.    PROCEDURE: Prior to the interventional radiology portion of the procedure, Dr. Quinn placed a retrograde ureteral catheter into the renal pelvis. Multi projectional retrograde pyelography was performed through the ureteral catheter in order to better   define the complexity of the intrarenal collecting system, calculi location and overall burden, and determine the most appropriate initial percutaneous access to allow for nephrolithotomy. This demonstrated a large renal pelvic stone with contrast   filling multiple calyces. The most suitable site for percutaneous nephrolithotomy is deemed to be a posterior lower pole calyx.      Under direct fluoroscopic guidance, an AccuStick needle was inserted inserted into a posterior lower pole calyx of the left kidney from a low intercostal approach below the 12th rib. A wire was manipulated through the ureter and into the urinary bladder.   The tract was dilated, and a 10 Kinyarwanda sheath was placed, through which a second \"safety\" wire was advanced into the urinary bladder. Over the initial wire, the percutaneous nephrostomy access was dilated with a 30 Kinyarwanda NephroMax balloon, and a 30   Kinyarwanda sheath was placed into the intrarenal collecting system.     Dr. Quinn performed percutaneous nephrolithotomy through the access sheath. Please refer to the dedicated urology note for specific details.    Following percutaneous nephrolithotomy, multi projectional fluoroscopic images were obtained to evaluate for any residual radiopaque calculi. This demonstrated no residual radiopaque calculi are identified.  A 5 Kinyarwanda catheter was placed over one of the   wires into the renal pelvis. Antegrade pyelography was " performed through the catheter to provide diagnostic information for ureteral stent placement. Over the second wire, an 8F x 24 cm ureteral stent was placed in an antegrade fashion. The distal loop   was formed within the urinary bladder and the proximal loop within the renal pelvis. The 5 Tamazight catheter was removed. A gelfoam slurry was instilled through the stent pusher as it was removed along the percutaneous pathway.     The completion images show the newly placed ureteral stent with its distal loop in the urinary bladder and proximal loop in the renal collecting system.      Impression    IMPRESSION:   1.  Successful percutaneous nephrostomy access for percutaneous nephrolithotomy, as discussed above.   2.  Percutaneous nephrolithotomy. Please refer to the dedicated urologist procedure note for specific details.   3.  Successful antegrade placement of a ureteral stent which fluoroscopically is in appropriate position.     PLAN:  1. Patient to be recovered in PACU.  2. Remaining cares as per Urology.     Glucose by meter   Result Value Ref Range    GLUCOSE BY METER POCT 88 70 - 99 mg/dL   CBC with platelets differential *Canceled*    Narrative    The following orders were created for panel order CBC with platelets differential.  Procedure                               Abnormality         Status                     ---------                               -----------         ------                       Please view results for these tests on the individual orders.   Glucose by meter   Result Value Ref Range    GLUCOSE BY METER POCT 132 (H) 70 - 99 mg/dL   Glucose by meter   Result Value Ref Range    GLUCOSE BY METER POCT 269 (H) 70 - 99 mg/dL   ECG 12-LEAD WITH MUSE (LHE)   Result Value Ref Range    Systolic Blood Pressure  mmHg    Diastolic Blood Pressure  mmHg    Ventricular Rate 83 BPM    Atrial Rate 83 BPM    VT Interval 200 ms    QRS Duration 100 ms     ms    QTc 462 ms    P Axis 56 degrees    R AXIS  -40 degrees    T Axis 31 degrees    Interpretation ECG       Sinus rhythm with occasional Premature ventricular complexes and Fusion complexes  Left axis deviation  Minimal voltage criteria for LVH, may be normal variant  Abnormal ECG  When compared with ECG of 25-APR-2023 10:35,  Fusion complexes are now Present  Premature ventricular complexes are now Present  Criteria for Septal infarct are no longer Present     TSH with free T4 reflex   Result Value Ref Range    TSH 1.42 0.30 - 4.20 uIU/mL   Lactic acid whole blood   Result Value Ref Range    Lactic Acid 3.4 (H) 0.7 - 2.0 mmol/L   Troponin T, High Sensitivity   Result Value Ref Range    Troponin T, High Sensitivity 14 <=14 ng/L   UA with Microscopic reflex to Culture    Specimen: Urine, Oliveira Catheter   Result Value Ref Range    Color Urine Colorless Colorless, Straw, Light Yellow, Yellow    Appearance Urine Clear Clear    Glucose Urine Negative Negative mg/dL    Bilirubin Urine Negative Negative    Ketones Urine Negative Negative mg/dL    Specific Gravity Urine 1.005 1.001 - 1.030    Blood Urine >1.0 mg/dL (A) Negative    pH Urine 7.5 (H) 5.0 - 7.0    Protein Albumin Urine Negative Negative mg/dL    Urobilinogen Urine <2.0 <2.0 mg/dL    Nitrite Urine Negative Negative    Leukocyte Esterase Urine 75 Job/uL (A) Negative    Bacteria Urine Few (A) None Seen /HPF    Mucus Urine Present (A) None Seen /LPF    Amorphous Crystals Urine Few (A) None Seen /HPF    RBC Urine 175 (H) <=2 /HPF    WBC Urine 11 (H) <=5 /HPF    Squamous Epithelials Urine <1 <=1 /HPF    Hyaline Casts Urine 6 (H) <=2 /LPF    Narrative    Urine Culture ordered based on laboratory criteria   XR Chest Port 1 View    Narrative    EXAM: CHEST 2 VIEWS  LOCATION: [facility]  DATE: 3/12/2024    INDICATION: Indigestion and bradycardia.  COMPARISON: None.    FINDINGS: A small band-like opacity at the lateral aspect of the left lung base was present previously and likely represents  scarring. The lungs are otherwise clear. Normal size cardiac silhouette. Atherosclerotic calcification in the thoracic aorta.   Surgical clips in the upper right hemiabdomen likely relate to prior cholecystectomy.      Impression    IMPRESSION: No evidence of active cardiopulmonary disease.    Comprehensive metabolic panel   Result Value Ref Range    Sodium 136 135 - 145 mmol/L    Potassium 4.4 3.4 - 5.3 mmol/L    Carbon Dioxide (CO2) 24 22 - 29 mmol/L    Anion Gap 9 7 - 15 mmol/L    Urea Nitrogen 11.4 8.0 - 23.0 mg/dL    Creatinine 0.96 (H) 0.51 - 0.95 mg/dL    GFR Estimate 62 >60 mL/min/1.73m2    Calcium 10.1 8.8 - 10.2 mg/dL    Chloride 103 98 - 107 mmol/L    Glucose 202 (H) 70 - 99 mg/dL    Alkaline Phosphatase 64 40 - 150 U/L    AST 11 0 - 45 U/L    ALT 9 0 - 50 U/L    Protein Total 5.8 (L) 6.4 - 8.3 g/dL    Albumin 3.1 (L) 3.5 - 5.2 g/dL    Bilirubin Total 0.5 <=1.2 mg/dL   Magnesium   Result Value Ref Range    Magnesium 1.8 1.7 - 2.3 mg/dL   Procalcitonin   Result Value Ref Range    Procalcitonin 0.03 <0.50 ng/mL   CBC with Platelets & Differential    Narrative    The following orders were created for panel order CBC with Platelets & Differential.  Procedure                               Abnormality         Status                     ---------                               -----------         ------                     CBC with platelets and d...[[id number]]  Abnormal            Final result                 Please view results for these tests on the individual orders.   Lactic acid whole blood   Result Value Ref Range    Lactic Acid 3.3 (H) 0.7 - 2.0 mmol/L   CBC with platelets and differential   Result Value Ref Range    WBC Count 8.9 4.0 - 11.0 10e3/uL    RBC Count 4.05 3.80 - 5.20 10e6/uL    Hemoglobin 11.6 (L) 11.7 - 15.7 g/dL    Hematocrit 36.2 35.0 - 47.0 %    MCV 89 78 - 100 fL    MCH 28.6 26.5 - 33.0 pg    MCHC 32.0 31.5 - 36.5 g/dL    RDW 13.2 10.0 - 15.0 %    Platelet Count 405 150 - 450 10e3/uL    %  Neutrophils 84 %    % Lymphocytes 8 %    % Monocytes 7 %    % Eosinophils 0 %    % Basophils 0 %    % Immature Granulocytes 1 %    NRBCs per 100 WBC 0 <1 /100    Absolute Neutrophils 7.5 1.6 - 8.3 10e3/uL    Absolute Lymphocytes 0.8 0.8 - 5.3 10e3/uL    Absolute Monocytes 0.6 0.0 - 1.3 10e3/uL    Absolute Eosinophils 0.0 0.0 - 0.7 10e3/uL    Absolute Basophils 0.0 0.0 - 0.2 10e3/uL    Absolute Immature Granulocytes 0.1 <=0.4 10e3/uL    Absolute NRBCs 0.0 10e3/uL   Extra Tube    Narrative    The following orders were created for panel order Extra Tube.  Procedure                               Abnormality         Status                     ---------                               -----------         ------                     Extra Red Top Tube[[id number]]                               In process                   Please view results for these tests on the individual orders.   CT Chest/Abdomen/Pelvis w Contrast    Narrative    EXAM: CT CHEST, ABDOMEN AND PELVIS WITH CONTRAST  LOCATION: [facility]  DATE/TIME: 3/12/2024 6:10 AM CDT    INDICATION: Indigestion. Bradycardia. Question sepsis.  COMPARISON:  10/30/2023 - CT abdomen and pelvis.  10/26/2015 - CT chest, abdomen and pelvis.    TECHNIQUE: CT scan of the chest, abdomen, and pelvis was performed following injection of IV contrast. Multiplanar reformats were obtained. Dose reduction techniques were used.  CONTRAST: 66 mL Isovue 370.    FINDINGS:    LUNGS AND PLEURA: 0.6 cm nodule in the anterior aspect of the left upper lobe (series 4 image 111), unchanged since 10/26/2015 and therefore consistent with benign etiology.    MEDIASTINUM/AXILLAE: A 4.2 x 2.9 cm calcified left lobe of thyroid nodule again noted. Atherosclerotic calcification in the aorta.    CORONARY ARTERY CALCIFICATION: Not definitely present.    HEPATOBILIARY: Prior cholecystectomy.    SPLEEN: Unremarkable.    PANCREAS: Mild to moderate diffuse parenchymal atrophy.    ADRENAL  GLANDS: Unremarkable.    KIDNEYS/BLADDER: No suspicious renal lesions. A left ureteral stent is present with proximal pigtail in the renal pelvis and distal pigtail in the urinary bladder lumen. No dilatation of the intrarenal collecting systems or ureters. Haziness is present   within the left peripelvic fat, similar in appearance to the comparison study dated 10/30/2023. A balloon tip catheter is present in the urinary bladder lumen.    BOWEL: The esophagus is mildly distended with fluid. Prior gastric surgery. Moderate distention of the proximal aspect of the stomach with fluid and a small amount of gas. The stomach distal to the site of surgery is decompressed. The duodenum, jejunum,   ileum and colon are normal in caliber. A few colonic diverticula are present without evidence of diverticulitis. The appendix is not visualized. No bowel wall thickening, pneumatosis or free intraperitoneal gas.    LYMPH NODES: No acute findings.    PELVIC ORGANS: No acute findings.    MUSCULOSKELETAL: No acute findings.    OTHER: A trace amount of free fluid in the pelvis.      Impression    IMPRESSION:   1.  Prior gastric surgery. There is mild distention of the esophagus with fluid and moderate distention of the proximal aspect of the stomach with fluid. The portion of the stomach distal to the site of surgery is decompressed. These findings are   suggestive of an obstruction within the stomach at the site of prior surgery.  2.  A trace amount of nonspecific free fluid in the pelvis.  3.  No other acute abnormality identified in the chest, abdomen or pelvis.  4.  A left ureteral stent is in place. Haziness is present within the fat about the left renal pelvis, where there is the proximal portion of the stent. This appearance was also present on 10/30/2023 and therefore is likely a long-standing finding,   possibly relating to chronic inflammation.                   Glucose by meter   Result Value Ref Range    GLUCOSE BY METER  POCT 170 (H) 70 - 99 mg/dL           [facility]  [facility] [address]

## 2024-03-13 ENCOUNTER — ANESTHESIA EVENT (OUTPATIENT)
Dept: SURGERY | Facility: HOSPITAL | Age: 74
DRG: 327 | End: 2024-03-13
Payer: MEDICARE

## 2024-03-13 ENCOUNTER — ANESTHESIA (OUTPATIENT)
Dept: SURGERY | Facility: HOSPITAL | Age: 74
DRG: 327 | End: 2024-03-13
Payer: MEDICARE

## 2024-03-13 LAB
ANION GAP SERPL CALCULATED.3IONS-SCNC: 8 MMOL/L (ref 7–15)
BACTERIA UR CULT: NO GROWTH
BUN SERPL-MCNC: 8.5 MG/DL (ref 8–23)
CALCIUM SERPL-MCNC: 9.6 MG/DL (ref 8.8–10.2)
CHLORIDE SERPL-SCNC: 107 MMOL/L (ref 98–107)
CREAT SERPL-MCNC: 0.93 MG/DL (ref 0.51–0.95)
DEPRECATED HCO3 PLAS-SCNC: 23 MMOL/L (ref 22–29)
EGFRCR SERPLBLD CKD-EPI 2021: 65 ML/MIN/1.73M2
ERYTHROCYTE [DISTWIDTH] IN BLOOD BY AUTOMATED COUNT: 13.2 % (ref 10–15)
GLUCOSE BLDC GLUCOMTR-MCNC: 215 MG/DL (ref 70–99)
GLUCOSE BLDC GLUCOMTR-MCNC: 75 MG/DL (ref 70–99)
GLUCOSE SERPL-MCNC: 79 MG/DL (ref 70–99)
HCT VFR BLD AUTO: 36.8 % (ref 35–47)
HGB BLD-MCNC: 11.7 G/DL (ref 11.7–15.7)
INR PPP: 1.11 (ref 0.85–1.15)
MCH RBC QN AUTO: 28.8 PG (ref 26.5–33)
MCHC RBC AUTO-ENTMCNC: 31.8 G/DL (ref 31.5–36.5)
MCV RBC AUTO: 91 FL (ref 78–100)
PLATELET # BLD AUTO: 368 10E3/UL (ref 150–450)
POTASSIUM SERPL-SCNC: 3.7 MMOL/L (ref 3.4–5.3)
RBC # BLD AUTO: 4.06 10E6/UL (ref 3.8–5.2)
SODIUM SERPL-SCNC: 138 MMOL/L (ref 135–145)
WBC # BLD AUTO: 9.3 10E3/UL (ref 4–11)

## 2024-03-13 PROCEDURE — 99222 1ST HOSP IP/OBS MODERATE 55: CPT | Mod: 57 | Performed by: SURGERY

## 2024-03-13 PROCEDURE — 0FN24ZZ RELEASE LEFT LOBE LIVER, PERCUTANEOUS ENDOSCOPIC APPROACH: ICD-10-PCS | Performed by: SURGERY

## 2024-03-13 PROCEDURE — 272N000001 HC OR GENERAL SUPPLY STERILE: Performed by: SURGERY

## 2024-03-13 PROCEDURE — 0DP64CZ REMOVAL OF EXTRALUMINAL DEVICE FROM STOMACH, PERCUTANEOUS ENDOSCOPIC APPROACH: ICD-10-PCS | Performed by: SURGERY

## 2024-03-13 PROCEDURE — 43245 EGD DILATE STRICTURE: CPT | Mod: 51 | Performed by: SURGERY

## 2024-03-13 PROCEDURE — 250N000011 HC RX IP 250 OP 636: Performed by: STUDENT IN AN ORGANIZED HEALTH CARE EDUCATION/TRAINING PROGRAM

## 2024-03-13 PROCEDURE — 80048 BASIC METABOLIC PNL TOTAL CA: CPT | Performed by: STUDENT IN AN ORGANIZED HEALTH CARE EDUCATION/TRAINING PROGRAM

## 2024-03-13 PROCEDURE — 258N000003 HC RX IP 258 OP 636: Performed by: ANESTHESIOLOGY

## 2024-03-13 PROCEDURE — 370N000017 HC ANESTHESIA TECHNICAL FEE, PER MIN: Performed by: SURGERY

## 2024-03-13 PROCEDURE — 43772 LAP RMVL GASTR ADJ DEVICE: CPT | Performed by: SURGERY

## 2024-03-13 PROCEDURE — 99233 SBSQ HOSP IP/OBS HIGH 50: CPT | Performed by: STUDENT IN AN ORGANIZED HEALTH CARE EDUCATION/TRAINING PROGRAM

## 2024-03-13 PROCEDURE — 710N000009 HC RECOVERY PHASE 1, LEVEL 1, PER MIN: Performed by: SURGERY

## 2024-03-13 PROCEDURE — 250N000009 HC RX 250: Performed by: ANESTHESIOLOGY

## 2024-03-13 PROCEDURE — 258N000003 HC RX IP 258 OP 636: Performed by: UROLOGY

## 2024-03-13 PROCEDURE — 250N000011 HC RX IP 250 OP 636: Performed by: ANESTHESIOLOGY

## 2024-03-13 PROCEDURE — 258N000003 HC RX IP 258 OP 636: Performed by: SURGERY

## 2024-03-13 PROCEDURE — 36415 COLL VENOUS BLD VENIPUNCTURE: CPT | Performed by: STUDENT IN AN ORGANIZED HEALTH CARE EDUCATION/TRAINING PROGRAM

## 2024-03-13 PROCEDURE — 250N000013 HC RX MED GY IP 250 OP 250 PS 637: Performed by: SURGERY

## 2024-03-13 PROCEDURE — 88300 SURGICAL PATH GROSS: CPT | Mod: TC | Performed by: SURGERY

## 2024-03-13 PROCEDURE — 360N000077 HC SURGERY LEVEL 4, PER MIN: Performed by: SURGERY

## 2024-03-13 PROCEDURE — 250N000011 HC RX IP 250 OP 636: Performed by: SURGERY

## 2024-03-13 PROCEDURE — C1726 CATH, BAL DIL, NON-VASCULAR: HCPCS | Performed by: SURGERY

## 2024-03-13 PROCEDURE — 120N000001 HC R&B MED SURG/OB

## 2024-03-13 PROCEDURE — 250N000013 HC RX MED GY IP 250 OP 250 PS 637: Performed by: STUDENT IN AN ORGANIZED HEALTH CARE EDUCATION/TRAINING PROGRAM

## 2024-03-13 PROCEDURE — 85027 COMPLETE CBC AUTOMATED: CPT | Performed by: STUDENT IN AN ORGANIZED HEALTH CARE EDUCATION/TRAINING PROGRAM

## 2024-03-13 PROCEDURE — 250N000013 HC RX MED GY IP 250 OP 250 PS 637: Performed by: UROLOGY

## 2024-03-13 PROCEDURE — 85610 PROTHROMBIN TIME: CPT | Performed by: STUDENT IN AN ORGANIZED HEALTH CARE EDUCATION/TRAINING PROGRAM

## 2024-03-13 PROCEDURE — 999N000141 HC STATISTIC PRE-PROCEDURE NURSING ASSESSMENT: Performed by: SURGERY

## 2024-03-13 PROCEDURE — 250N000025 HC SEVOFLURANE, PER MIN: Performed by: SURGERY

## 2024-03-13 RX ORDER — KETAMINE HYDROCHLORIDE 10 MG/ML
INJECTION INTRAMUSCULAR; INTRAVENOUS PRN
Status: DISCONTINUED | OUTPATIENT
Start: 2024-03-13 | End: 2024-03-13

## 2024-03-13 RX ORDER — LIDOCAINE 40 MG/G
CREAM TOPICAL
Status: DISCONTINUED | OUTPATIENT
Start: 2024-03-13 | End: 2024-03-14 | Stop reason: HOSPADM

## 2024-03-13 RX ORDER — ONDANSETRON 2 MG/ML
4 INJECTION INTRAMUSCULAR; INTRAVENOUS EVERY 30 MIN PRN
Status: DISCONTINUED | OUTPATIENT
Start: 2024-03-13 | End: 2024-03-13 | Stop reason: HOSPADM

## 2024-03-13 RX ORDER — OXYCODONE HCL 5 MG/5 ML
10 SOLUTION, ORAL ORAL EVERY 4 HOURS PRN
Status: DISCONTINUED | OUTPATIENT
Start: 2024-03-13 | End: 2024-03-14 | Stop reason: HOSPADM

## 2024-03-13 RX ORDER — HYDROMORPHONE HYDROCHLORIDE 1 MG/ML
0.5 INJECTION, SOLUTION INTRAMUSCULAR; INTRAVENOUS; SUBCUTANEOUS
Status: DISCONTINUED | OUTPATIENT
Start: 2024-03-13 | End: 2024-03-13

## 2024-03-13 RX ORDER — ACETAMINOPHEN 325 MG/1
975 TABLET ORAL EVERY 8 HOURS
Qty: 27 TABLET | Refills: 0 | Status: DISCONTINUED | OUTPATIENT
Start: 2024-03-13 | End: 2024-03-13

## 2024-03-13 RX ORDER — HALOPERIDOL 5 MG/ML
1 INJECTION INTRAMUSCULAR
Status: DISCONTINUED | OUTPATIENT
Start: 2024-03-13 | End: 2024-03-13 | Stop reason: HOSPADM

## 2024-03-13 RX ORDER — LIDOCAINE HYDROCHLORIDE 10 MG/ML
INJECTION, SOLUTION INFILTRATION; PERINEURAL PRN
Status: DISCONTINUED | OUTPATIENT
Start: 2024-03-13 | End: 2024-03-13

## 2024-03-13 RX ORDER — LORAZEPAM 2 MG/ML
.5-1 INJECTION INTRAMUSCULAR
Status: DISCONTINUED | OUTPATIENT
Start: 2024-03-13 | End: 2024-03-13 | Stop reason: HOSPADM

## 2024-03-13 RX ORDER — BISACODYL 10 MG
10 SUPPOSITORY, RECTAL RECTAL DAILY PRN
Status: DISCONTINUED | OUTPATIENT
Start: 2024-03-16 | End: 2024-03-14 | Stop reason: HOSPADM

## 2024-03-13 RX ORDER — PROPOFOL 10 MG/ML
INJECTION, EMULSION INTRAVENOUS PRN
Status: DISCONTINUED | OUTPATIENT
Start: 2024-03-13 | End: 2024-03-13

## 2024-03-13 RX ORDER — OXYCODONE HYDROCHLORIDE 5 MG/1
10 TABLET ORAL EVERY 4 HOURS PRN
Status: DISCONTINUED | OUTPATIENT
Start: 2024-03-13 | End: 2024-03-13

## 2024-03-13 RX ORDER — FENTANYL CITRATE 50 UG/ML
50 INJECTION, SOLUTION INTRAMUSCULAR; INTRAVENOUS EVERY 5 MIN PRN
Status: DISCONTINUED | OUTPATIENT
Start: 2024-03-13 | End: 2024-03-13 | Stop reason: HOSPADM

## 2024-03-13 RX ORDER — MAGNESIUM SULFATE 4 G/50ML
4 INJECTION INTRAVENOUS ONCE
Status: COMPLETED | OUTPATIENT
Start: 2024-03-13 | End: 2024-03-13

## 2024-03-13 RX ORDER — HEPARIN SODIUM 5000 [USP'U]/.5ML
5000 INJECTION, SOLUTION INTRAVENOUS; SUBCUTANEOUS EVERY 8 HOURS
Status: DISCONTINUED | OUTPATIENT
Start: 2024-03-14 | End: 2024-03-14 | Stop reason: HOSPADM

## 2024-03-13 RX ORDER — CEFAZOLIN SODIUM/WATER 2 G/20 ML
2 SYRINGE (ML) INTRAVENOUS SEE ADMIN INSTRUCTIONS
Status: DISCONTINUED | OUTPATIENT
Start: 2024-03-13 | End: 2024-03-13 | Stop reason: HOSPADM

## 2024-03-13 RX ORDER — FENTANYL CITRATE 50 UG/ML
25 INJECTION, SOLUTION INTRAMUSCULAR; INTRAVENOUS EVERY 5 MIN PRN
Status: DISCONTINUED | OUTPATIENT
Start: 2024-03-13 | End: 2024-03-13 | Stop reason: HOSPADM

## 2024-03-13 RX ORDER — PROCHLORPERAZINE MALEATE 5 MG
5 TABLET ORAL EVERY 6 HOURS PRN
Status: DISCONTINUED | OUTPATIENT
Start: 2024-03-13 | End: 2024-03-14 | Stop reason: HOSPADM

## 2024-03-13 RX ORDER — EPHEDRINE SULFATE 50 MG/ML
INJECTION, SOLUTION INTRAMUSCULAR; INTRAVENOUS; SUBCUTANEOUS PRN
Status: DISCONTINUED | OUTPATIENT
Start: 2024-03-13 | End: 2024-03-13

## 2024-03-13 RX ORDER — ACETAMINOPHEN 325 MG/1
650 TABLET ORAL EVERY 4 HOURS PRN
Status: DISCONTINUED | OUTPATIENT
Start: 2024-03-16 | End: 2024-03-13

## 2024-03-13 RX ORDER — NALOXONE HYDROCHLORIDE 0.4 MG/ML
0.1 INJECTION, SOLUTION INTRAMUSCULAR; INTRAVENOUS; SUBCUTANEOUS
Status: DISCONTINUED | OUTPATIENT
Start: 2024-03-13 | End: 2024-03-13 | Stop reason: HOSPADM

## 2024-03-13 RX ORDER — AMOXICILLIN 250 MG
1 CAPSULE ORAL 2 TIMES DAILY
Status: DISCONTINUED | OUTPATIENT
Start: 2024-03-13 | End: 2024-03-14 | Stop reason: HOSPADM

## 2024-03-13 RX ORDER — ONDANSETRON 2 MG/ML
4 INJECTION INTRAMUSCULAR; INTRAVENOUS EVERY 6 HOURS PRN
Status: DISCONTINUED | OUTPATIENT
Start: 2024-03-13 | End: 2024-03-14 | Stop reason: HOSPADM

## 2024-03-13 RX ORDER — CEFAZOLIN SODIUM/WATER 2 G/20 ML
2 SYRINGE (ML) INTRAVENOUS
Status: DISCONTINUED | OUTPATIENT
Start: 2024-03-13 | End: 2024-03-13 | Stop reason: HOSPADM

## 2024-03-13 RX ORDER — OXYCODONE HCL 5 MG/5 ML
5 SOLUTION, ORAL ORAL EVERY 4 HOURS PRN
Status: DISCONTINUED | OUTPATIENT
Start: 2024-03-13 | End: 2024-03-14 | Stop reason: HOSPADM

## 2024-03-13 RX ORDER — PROPOFOL 10 MG/ML
INJECTION, EMULSION INTRAVENOUS CONTINUOUS PRN
Status: DISCONTINUED | OUTPATIENT
Start: 2024-03-13 | End: 2024-03-13

## 2024-03-13 RX ORDER — HYDROXYZINE HYDROCHLORIDE 10 MG/1
10 TABLET, FILM COATED ORAL EVERY 6 HOURS PRN
Status: DISCONTINUED | OUTPATIENT
Start: 2024-03-13 | End: 2024-03-14 | Stop reason: HOSPADM

## 2024-03-13 RX ORDER — FAMOTIDINE 20 MG/1
20 TABLET, FILM COATED ORAL 2 TIMES DAILY
Status: DISCONTINUED | OUTPATIENT
Start: 2024-03-13 | End: 2024-03-13

## 2024-03-13 RX ORDER — MAGNESIUM SULFATE 4 G/50ML
4 INJECTION INTRAVENOUS ONCE
Status: DISCONTINUED | OUTPATIENT
Start: 2024-03-13 | End: 2024-03-13

## 2024-03-13 RX ORDER — ONDANSETRON 4 MG/1
4 TABLET, ORALLY DISINTEGRATING ORAL EVERY 6 HOURS PRN
Status: DISCONTINUED | OUTPATIENT
Start: 2024-03-13 | End: 2024-03-14 | Stop reason: HOSPADM

## 2024-03-13 RX ORDER — SODIUM CHLORIDE, SODIUM LACTATE, POTASSIUM CHLORIDE, CALCIUM CHLORIDE 600; 310; 30; 20 MG/100ML; MG/100ML; MG/100ML; MG/100ML
INJECTION, SOLUTION INTRAVENOUS CONTINUOUS PRN
Status: DISCONTINUED | OUTPATIENT
Start: 2024-03-13 | End: 2024-03-13

## 2024-03-13 RX ORDER — BUPIVACAINE HYDROCHLORIDE 2.5 MG/ML
INJECTION, SOLUTION INFILTRATION; PERINEURAL PRN
Status: DISCONTINUED | OUTPATIENT
Start: 2024-03-13 | End: 2024-03-13 | Stop reason: HOSPADM

## 2024-03-13 RX ORDER — ONDANSETRON 4 MG/1
4 TABLET, ORALLY DISINTEGRATING ORAL EVERY 30 MIN PRN
Status: DISCONTINUED | OUTPATIENT
Start: 2024-03-13 | End: 2024-03-13 | Stop reason: HOSPADM

## 2024-03-13 RX ORDER — FAMOTIDINE 20 MG/1
20 TABLET, FILM COATED ORAL DAILY
Status: DISCONTINUED | OUTPATIENT
Start: 2024-03-13 | End: 2024-03-14 | Stop reason: HOSPADM

## 2024-03-13 RX ORDER — OXYCODONE HYDROCHLORIDE 5 MG/1
5 TABLET ORAL EVERY 4 HOURS PRN
Status: DISCONTINUED | OUTPATIENT
Start: 2024-03-13 | End: 2024-03-13

## 2024-03-13 RX ORDER — ACETAMINOPHEN 325 MG/10.15ML
1000 LIQUID ORAL 3 TIMES DAILY
Status: DISCONTINUED | OUTPATIENT
Start: 2024-03-13 | End: 2024-03-14 | Stop reason: HOSPADM

## 2024-03-13 RX ORDER — SODIUM CHLORIDE, SODIUM LACTATE, POTASSIUM CHLORIDE, CALCIUM CHLORIDE 600; 310; 30; 20 MG/100ML; MG/100ML; MG/100ML; MG/100ML
INJECTION, SOLUTION INTRAVENOUS CONTINUOUS
Status: DISCONTINUED | OUTPATIENT
Start: 2024-03-13 | End: 2024-03-13 | Stop reason: HOSPADM

## 2024-03-13 RX ORDER — MAGNESIUM SULFATE 4 G/50ML
INJECTION INTRAVENOUS PRN
Status: DISCONTINUED | OUTPATIENT
Start: 2024-03-13 | End: 2024-03-13

## 2024-03-13 RX ORDER — DEXTROSE MONOHYDRATE, SODIUM CHLORIDE, AND POTASSIUM CHLORIDE 50; 1.49; 4.5 G/1000ML; G/1000ML; G/1000ML
INJECTION, SOLUTION INTRAVENOUS CONTINUOUS
Status: DISCONTINUED | OUTPATIENT
Start: 2024-03-13 | End: 2024-03-14

## 2024-03-13 RX ORDER — KETOROLAC TROMETHAMINE 30 MG/ML
15 INJECTION, SOLUTION INTRAMUSCULAR; INTRAVENOUS
Status: DISCONTINUED | OUTPATIENT
Start: 2024-03-13 | End: 2024-03-13 | Stop reason: HOSPADM

## 2024-03-13 RX ORDER — POLYETHYLENE GLYCOL 3350 17 G/17G
17 POWDER, FOR SOLUTION ORAL DAILY
Status: DISCONTINUED | OUTPATIENT
Start: 2024-03-14 | End: 2024-03-14 | Stop reason: HOSPADM

## 2024-03-13 RX ORDER — SODIUM CHLORIDE, SODIUM LACTATE, POTASSIUM CHLORIDE, AND CALCIUM CHLORIDE .6; .31; .03; .02 G/100ML; G/100ML; G/100ML; G/100ML
IRRIGANT IRRIGATION PRN
Status: DISCONTINUED | OUTPATIENT
Start: 2024-03-13 | End: 2024-03-13 | Stop reason: HOSPADM

## 2024-03-13 RX ORDER — FENTANYL CITRATE 50 UG/ML
INJECTION, SOLUTION INTRAMUSCULAR; INTRAVENOUS PRN
Status: DISCONTINUED | OUTPATIENT
Start: 2024-03-13 | End: 2024-03-13

## 2024-03-13 RX ORDER — HYDROMORPHONE HCL IN WATER/PF 6 MG/30 ML
0.2 PATIENT CONTROLLED ANALGESIA SYRINGE INTRAVENOUS
Status: DISCONTINUED | OUTPATIENT
Start: 2024-03-13 | End: 2024-03-13

## 2024-03-13 RX ADMIN — FENTANYL CITRATE 50 MCG: 50 INJECTION INTRAMUSCULAR; INTRAVENOUS at 08:22

## 2024-03-13 RX ADMIN — FENTANYL CITRATE 25 MCG: 50 INJECTION, SOLUTION INTRAMUSCULAR; INTRAVENOUS at 11:37

## 2024-03-13 RX ADMIN — PHENYLEPHRINE HYDROCHLORIDE 100 MCG: 10 INJECTION INTRAVENOUS at 10:05

## 2024-03-13 RX ADMIN — INSULIN ASPART 2 UNITS: 100 INJECTION, SOLUTION INTRAVENOUS; SUBCUTANEOUS at 16:38

## 2024-03-13 RX ADMIN — PHENYLEPHRINE HYDROCHLORIDE 100 MCG: 10 INJECTION INTRAVENOUS at 09:22

## 2024-03-13 RX ADMIN — POTASSIUM CHLORIDE, DEXTROSE MONOHYDRATE AND SODIUM CHLORIDE: 150; 5; 450 INJECTION, SOLUTION INTRAVENOUS at 12:47

## 2024-03-13 RX ADMIN — SODIUM CHLORIDE, POTASSIUM CHLORIDE, SODIUM LACTATE AND CALCIUM CHLORIDE: 600; 310; 30; 20 INJECTION, SOLUTION INTRAVENOUS at 09:35

## 2024-03-13 RX ADMIN — PANTOPRAZOLE SODIUM 40 MG: 40 TABLET, DELAYED RELEASE ORAL at 16:35

## 2024-03-13 RX ADMIN — SUGAMMADEX 200 MG: 100 INJECTION, SOLUTION INTRAVENOUS at 10:35

## 2024-03-13 RX ADMIN — FENTANYL CITRATE 25 MCG: 50 INJECTION, SOLUTION INTRAMUSCULAR; INTRAVENOUS at 11:23

## 2024-03-13 RX ADMIN — FENTANYL CITRATE 25 MCG: 50 INJECTION, SOLUTION INTRAMUSCULAR; INTRAVENOUS at 11:16

## 2024-03-13 RX ADMIN — PROPOFOL 170 MG: 10 INJECTION, EMULSION INTRAVENOUS at 08:30

## 2024-03-13 RX ADMIN — Medication 100 MG: at 08:30

## 2024-03-13 RX ADMIN — PROPOFOL 50 MCG/KG/MIN: 10 INJECTION, EMULSION INTRAVENOUS at 08:53

## 2024-03-13 RX ADMIN — SODIUM CHLORIDE, PRESERVATIVE FREE: 5 INJECTION INTRAVENOUS at 05:02

## 2024-03-13 RX ADMIN — Medication 10 MG: at 09:33

## 2024-03-13 RX ADMIN — FENTANYL CITRATE 50 MCG: 50 INJECTION INTRAMUSCULAR; INTRAVENOUS at 08:30

## 2024-03-13 RX ADMIN — SODIUM CHLORIDE, POTASSIUM CHLORIDE, SODIUM LACTATE AND CALCIUM CHLORIDE: 600; 310; 30; 20 INJECTION, SOLUTION INTRAVENOUS at 08:30

## 2024-03-13 RX ADMIN — FAMOTIDINE 20 MG: 10 INJECTION, SOLUTION INTRAVENOUS at 12:46

## 2024-03-13 RX ADMIN — ATORVASTATIN CALCIUM 60 MG: 40 TABLET, FILM COATED ORAL at 21:20

## 2024-03-13 RX ADMIN — LIDOCAINE HYDROCHLORIDE 50 MG: 10 INJECTION, SOLUTION INFILTRATION; PERINEURAL at 08:30

## 2024-03-13 RX ADMIN — MAGNESIUM SULFATE HEPTAHYDRATE 4 G: 80 INJECTION, SOLUTION INTRAVENOUS at 08:22

## 2024-03-13 RX ADMIN — ONDANSETRON 4 MG: 2 INJECTION INTRAMUSCULAR; INTRAVENOUS at 12:46

## 2024-03-13 RX ADMIN — PHENYLEPHRINE HYDROCHLORIDE 100 MCG: 10 INJECTION INTRAVENOUS at 09:11

## 2024-03-13 RX ADMIN — LEVOFLOXACIN 500 MG: 500 INJECTION, SOLUTION INTRAVENOUS at 06:36

## 2024-03-13 RX ADMIN — KETAMINE HYDROCHLORIDE 30 MG: 10 INJECTION INTRAMUSCULAR; INTRAVENOUS at 08:56

## 2024-03-13 RX ADMIN — OXYCODONE HYDROCHLORIDE 5 MG: 5 SOLUTION ORAL at 13:36

## 2024-03-13 RX ADMIN — POTASSIUM CHLORIDE, DEXTROSE MONOHYDRATE AND SODIUM CHLORIDE: 150; 5; 450 INJECTION, SOLUTION INTRAVENOUS at 21:59

## 2024-03-13 RX ADMIN — ACETAMINOPHEN 1000 MG: 325 SOLUTION ORAL at 21:17

## 2024-03-13 RX ADMIN — FENTANYL CITRATE 25 MCG: 50 INJECTION, SOLUTION INTRAMUSCULAR; INTRAVENOUS at 11:10

## 2024-03-13 RX ADMIN — PROPOFOL 30 MG: 10 INJECTION, EMULSION INTRAVENOUS at 08:54

## 2024-03-13 RX ADMIN — SUCRALFATE 1 G: 1 SUSPENSION ORAL at 16:35

## 2024-03-13 RX ADMIN — ACETAMINOPHEN 1000 MG: 325 SOLUTION ORAL at 16:34

## 2024-03-13 RX ADMIN — MAGNESIUM SULFATE HEPTAHYDRATE 4 G: 80 INJECTION, SOLUTION INTRAVENOUS at 08:11

## 2024-03-13 RX ADMIN — INSULIN ASPART 1 UNITS: 100 INJECTION, SOLUTION INTRAVENOUS; SUBCUTANEOUS at 22:23

## 2024-03-13 RX ADMIN — ROCURONIUM BROMIDE 50 MG: 50 INJECTION, SOLUTION INTRAVENOUS at 08:41

## 2024-03-13 RX ADMIN — FENTANYL CITRATE 25 MCG: 50 INJECTION, SOLUTION INTRAMUSCULAR; INTRAVENOUS at 11:29

## 2024-03-13 RX ADMIN — DOCUSATE SODIUM 50 MG AND SENNOSIDES 8.6 MG 1 TABLET: 8.6; 5 TABLET, FILM COATED ORAL at 21:19

## 2024-03-13 ASSESSMENT — ACTIVITIES OF DAILY LIVING (ADL)
ADLS_ACUITY_SCORE: 25
ADLS_ACUITY_SCORE: 26
ADLS_ACUITY_SCORE: 25

## 2024-03-13 NOTE — OP NOTE
Operative Note    Name:  Dotty Villanueva  Location: [facility] Main OR  Procedure Date:  3/11/2024 - 3/13/2024  PCP:  Lilia Doyle    Surgery Performed:  Procedure/Surgery Information   Procedure: Procedure(s):  REMOVAL, GASTRIC BAND, ADJUSTABLE, LAPAROSCOPIC, LYSIS OF ADHESIONS  ESOPHAGOGASTRODUODENOSCOPY, WITH DILATION   Surgeon(s): Surgeon(s) and Role:     * Melecio Mabry MD - Primary     * Stephanie Giang PA-C - Assisting   Specimens: ID Type Source Tests Collected by Time Destination   1 : lap band explant Implant Other SURGICAL PATHOLOGY EXAM Melecio Mabry MD 3/13/2024  9:44 AM                Pre-Procedure Diagnosis:  Gastric outlet obstruction [K31.1] stricture at the VBG    Post-Procedure Diagnosis:    Gastric outlet obstruction [K31.1] stricture at the VBG    Anesthesia:  General     Past Medical History:   Diagnosis Date    Anesthesia complication     O2 levels get low    Esophagitis     Essential hypertension     Gastroesophageal reflux disease with esophagitis     History of skin cancer     HTN (hypertension)     Hydronephrosis of left kidney     Hyperlipidemia     Kidney stone     2/20/2024    Malignant neoplasm of skin 07/18/2018    Formatting of this note might be different from the original. 7/2018- left chin, SCCIS- Narrow negative margins. Observe    Osteopenia     Pulmonary nodules 02/18/2016    Formatting of this note might be different from the original. Stable on CT for 24 months, per [place] [facility], no follow up needed 1/2016    Pure hypercholesterolemia     Renal calculi 04/26/2017    Stage 3 chronic kidney disease (H) 10/30/2023    Formatting of this note might be different from the original. 3a    Toxic multinodular goiter 10/30/2023    Formatting of this note might be different from the original. partially supressed TSH -2003-0.08;0.18 6/07; 0.11 10/07 TSH supressed 12/07 bilateral > 2cm dominant nodules-12/07 on us - negative fna bilateral 1/08 13% uptake with cold  nodule right 12/07 51 mCi -1/31/08    Type 2 diabetes mellitus (H)     Vitamin D deficiency        Patient Active Problem List    Diagnosis Date Noted    Kidney stone 03/11/2024     Priority: Medium    Gastroparesis 11/01/2023     Priority: Medium    Osteopenia 10/30/2023     Priority: Medium     Formatting of this note might be different from the original. 2/2016 repeat bone density testing in 3 years - ordered 1/9/2019- done  , offered again 4/2022- declined      Stage 3 chronic kidney disease (H) 10/30/2023     Priority: Medium     Formatting of this note might be different from the original. 3a      Toxic multinodular goiter 10/30/2023     Priority: Medium     Formatting of this note might be different from the original. partially supressed TSH -2003-0.08;0.18 6/07; 0.11 10/07 TSH supressed 12/07 bilateral > 2cm dominant nodules-12/07 on us - negative fna bilateral 1/08 13% uptake with cold nodule right 12/07 51 mCi -1/31/08      Early satiety 10/30/2023     Priority: Medium    Nausea and vomiting 10/30/2023     Priority: Medium    Esophagitis 10/30/2023     Priority: Medium    Type 2 diabetes mellitus (H) 10/30/2023     Priority: Medium    GERD (gastroesophageal reflux disease) 10/30/2023     Priority: Medium    Gastric outlet obstruction 10/30/2023     Priority: Medium    Abnormal weight loss 08/04/2023     Priority: Medium    Malignant neoplasm of skin 07/18/2018     Priority: Medium     Formatting of this note might be different from the original. 7/2018- left chin, SCCIS- Narrow negative margins. Observe      Hydronephrosis of left kidney 04/26/2017     Priority: Medium    Renal calculi 04/26/2017     Priority: Medium    Pulmonary nodules 02/18/2016     Priority: Medium     Formatting of this note might be different from the original. Stable on CT for 24 months, per [facility], no follow up needed 1/2016      Vitamin D deficiency 01/29/2015     Priority: Medium    Essential hypertension 05/28/2014      Priority: Medium    Type 2 diabetes mellitus with microalbuminuria, without long-term current use of insulin (H) 12/15/2013     Priority: Medium    Pure hypercholesterolemia 07/12/2006     Priority: Medium       Findings:  Patient has a VBG in place and has a very dilated stomach proximal to the VBG site.    Operative Report:    Patient is brought to the operating room placed in the supine position given general endotracheal anesthesia sterilely prepped and draped in the usual surgical fashion and a timeout process was undertaken.    5 mm trocar brought in the left upper quadrant I did not get a clean plane in that area I then did a 5 mm trocar in the right upper quadrant under direct visualization of the laparoscope and got into the intraperitoneal cavity brought a pneumoperitoneum up to 15 mmHg I then brought in 2 additional 5 mm trocars and took down adhesions all along the upper midline.  I was able to place 2 additional 5 mm trocars in locations that help me accommodate the surgery.  I lysed adhesions all between the inferior edge of the left lobe of the liver and the anterior wall of the stomach.  This allow me to get a liver retractor in the [facility] liver retractor was used to elevate the left lobe of the liver.    I identified the Prolene suture along the lesser curvature of the stomach and dissected down on a Prolene suture this help me identify the VVG band.  I shelled the VBG band out of its site for the anterior aspect of the course of the VBG.  I then transected the VBG band and took it out in 2 separate pieces.  The VBG was sent for specimen.    I then did an upper endoscopy the scope was advanced down through the esophagus the lower esophagus was noted to have food material the gastric pouch was very distended and had a lot of food material in it.  The scope was taken along the lesser curvature to the VVG site that was quite tight but I was able to maneuver the endoscope out into the distal stomach.   I looked at the prepyloric area and the first portion of the duodenum.  I then quinten the scope back and advanced an 18 mm dilation balloon out into the distal stomach.  I drew it back across the VBG site I dilated that up sequentially for stage second stage third stage which would be 1617 and 18 mm.  I held it at the 18 mm size fully distended for 2 minutes.  I took the balloon down and removed it.  I then was able to advance the scope across this opening without significant difficulty.  I aspirated air from the distal stomach and from the proximal part of the stomach above the VBG site as well as the esophagus upon withdrawal of the scope.    The Татья[facility] liver retractor was removed and the operative field evaluated with suction irrigation we did irrigate this area where the band was removed before completing the surgery to make sure there is no air leakage from the site and there was not.  All the affluent was aspirated the pneumoperitoneum was aspirated from the abdominal cavity and the trocars were removed.  All skin incisions were closed with 4 oh subcu Monocryl sutures.  The wounds were dressed with Telfa and Tegaderm.  The patient was exploited and taken to recovery    Estimated Blood Loss:   5 cc       Drains:   NG/OG/NJ Tube Nasogastric Right nostril (Active)   Site Description WDL 03/13/24 0707   Status Suction-low intermittent 03/13/24 0707   Drainage Appearance Other (comment) 03/13/24 0707   Output (ml) 300 ml 03/13/24 0008       Urethral Catheter 03/11/24 Non-latex 20 fr (Active)   Tube Description Positional 03/11/24 2349   Catheter Care Done 03/13/24 0442   Collection Container Standard 03/13/24 0707   Securement Method Securing device (Describe) 03/13/24 0707   Rationale for Continued Use Surgical procedure 03/13/24 0707   Urine Output 950 mL 03/13/24 0358       Implants:  * No implants in log *    Complications:    None    Melecio Mabry MD     Date: 3/13/2024  Time: 10:57 AM

## 2024-03-13 NOTE — PLAN OF CARE
Goal Outcome Evaluation:      Plan of Care Reviewed With: patient    Overall Patient Progress: declining    Oliveira still in place. NG currently suctioning. Positioned at 52/53. Patient had adverse reaction to IV Dilaudid - distorted thoughts and visual disturbances. She has requested we not give her it again. She has some anxiety over her NG tube and po ativan seemed to work well. Suction was stopped after she was given the PO meds. The 40 atorvastin had some trouble getting down her throat, Patient will be NPO at might in preparation for her lap band surgery tomorrow. Patient's Aqua K never came, it will have to be followed up on for her. It may be a moot point now. Patient has NS running at 75l/hr. Last blood sugar was 107 at 21:37. Patient is now upgraded to Inpatient status.

## 2024-03-13 NOTE — PLAN OF CARE
Problem: Adult Inpatient Plan of Care  Goal: Optimal Comfort and Wellbeing  Outcome: Progressing  Intervention: Monitor Pain and Promote Comfort  Recent Flowsheet Documentation  Taken 3/13/2024 1336 by Berkley Franz RN  Pain Management Interventions: medication (see MAR)       PT returned back to bed at 1209 from surgery. Gave IV zofran for nausea, effective. /74. Having sore throat. Gave liquid Oxycodone for pain 5/10. Rated pain 3/10 after pain med, BP recheck came down to 168/77. Continues on clear liquids, has maintenance fluid infusing. Oliveira intact.      Pt refused capnography. On room air,  94%, RR 18-20.

## 2024-03-13 NOTE — OR NURSING
Lap band surgically removed by Dr. Mabry. He verified two pieces for a complete explant. Sent to pathology for gross only exam.

## 2024-03-13 NOTE — PROGRESS NOTES
[facility]  Consult Note - Hospitalist Service  Date of Admission:  3/11/2024  Consult Requested by: Dr. Quinn  Reason for Consult: Diabetes management    Assessment & Plan   Dotty Villanueva is a 73 year old female with PMHx of DM2 c/b gastroparesis, gastric banding in 2000, HTN, vitamin D deficiency, nephrolithiasis who presents for post-op admission after a left-sided percutaneous nephrolithotomy and cystoscopy with left ureteral stent placed. Now undergoing gastric lap band removal.    #Severe gastroparesis  #History of gastric lap band with gastric obstruction  Gastric lap band placed 2000. The patient reports living on soups and toast for at least the past year due to difficulty with N/V when eating regular foods. She reports losing significant weight during this time. Was admitted at [facility] 11/2023 for these symptoms. 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 Ultimately the patient's lap band is now obstructing the flow of food through her stomach. She may not have gastroparesis as the lap band may have been confounding the results.  - N/V post-op 3/11 led to surgery consult  - S/p lab band removal with Dr. Mabry 3/13, also with intra-op EGD and gastric stricture dilation  - NGT for decompression pre-op to reduce risk of aspiration with anesthesia, removed post-op  - Plan for intra-op EGD as well per surgery  - Diet per surgery  - Patient with some throat soreness post-op, requesting liquid pain meds: Tylenol and oxycodone liquids added    #Left-sided nephrolithiasis  Underwent percutaneous nephrolithotomy and cystoscopy with left ureteral stent placement on 3/11/24 with Urology. Doing well post-op.  - Urology following; appreciate expertise  - Oliveira per Urology/surgery, can likely remove post-op 3/13  - Pain controlled from this procedure    #UTI  Per Urology, would treat based on recent UA results  and dysuria symptoms she was experiencing prior to admission and presence of ureteral stent  - Levofloxacin x7 days    #Lactic acidosis  Asymptomatic. Mild, probably due to recent surgery. Resolved with IVF. Perfusing well.    #Type 2 DM without long-term insulin use  Well controlled.  - Hold hold glyburide to avoid hypoglycemia  - Sliding scale insulin added, but unlikely to need much, goal pre-prandial BG <180  - Continue home atorvastatin    #Anxiety  PDMP reviewed, recently prescribed Ativan 2mg q6h PRN, a hefty dose for this patient, but should continue.  - Continue home Ativan, but hold or reduce dose of any signs of sedation    #HTN  - Continue lisinopril as substituted for home benazepril, hold 3/13 AM, can give post-op  - Continue home amlodipine    #GERD  History of esophagitis  - Continue home PPI BID, sucralfate         Clinically Significant Risk Factors Present on Admission           # Hypercalcemia: corrected calcium is >10.1, will monitor as appropriate    # Hypoalbuminemia: Lowest albumin = 3.1 g/dL at 3/12/2024  3:12 AM, will monitor as appropriate   # Drug Induced Platelet Defect: home medication list includes an antiplatelet medication   # Hypertension: Noted on problem list     # DMII: A1C = N/A within past 6 months               THADDEUS MCMAHON MD  Hospitalist Service  Securely message with [employer] (more info)  Text page via [employer] Paging/Directory   ______________________________________________________________________    Interval history:  Overnight vomited due to gastroparesis and known gastric lap band obstruction. Feeling much better this morning.      Past Medical History    Past Medical History:   Diagnosis Date    Anesthesia complication     O2 levels get low    Esophagitis     Essential hypertension     Gastroesophageal reflux disease with esophagitis     History of skin cancer     HTN (hypertension)     Hydronephrosis of left kidney     Hyperlipidemia     Kidney stone     2/20/2024     Malignant neoplasm of skin 07/18/2018    Formatting of this note might be different from the original. 7/2018- left chin, SCCIS- Narrow negative margins. Observe    Osteopenia     Pulmonary nodules 02/18/2016    Formatting of this note might be different from the original. Stable on CT for 24 months, per [place] [facility], no follow up needed 1/2016    Pure hypercholesterolemia     Renal calculi 04/26/2017    Stage 3 chronic kidney disease (H) 10/30/2023    Formatting of this note might be different from the original. 3a    Toxic multinodular goiter 10/30/2023    Formatting of this note might be different from the original. partially supressed TSH -2003-0.08;0.18 6/07; 0.11 10/07 TSH supressed 12/07 bilateral > 2cm dominant nodules-12/07 on us - negative fna bilateral 1/08 13% uptake with cold nodule right 12/07 51 mCi -1/31/08    Type 2 diabetes mellitus (H)     Vitamin D deficiency        Past Surgical History   Past Surgical History:   Procedure Laterality Date    APPENDECTOMY      CHOLECYSTECTOMY      ESOPHAGOSCOPY, GASTROSCOPY, DUODENOSCOPY (EGD), COMBINED N/A 11/01/2023    Procedure: ESOPHAGOGASTRODUODENOSCOPY WITH BIOPSY AND BALLOON DILATION;  Surgeon: Mika Leon MD;  Location: [facility] OR    HAND CONTRACTURE RELEASE Left     HYSTERECTOMY      Complicated by bowel laceration.  Required colonic repair.    IR NEPHROLITHOTOMY  3/11/2024    LAPAROSCOPIC CONVERTED TO OPEN ABDOMINAL HYSTERECTOMY WITH TRANSVERSE COLON REPAIR      bowel laceration    LAPAROSCOPIC GASTRIC BANDING  2000    LEEP TX, CERVICAL      PERCUTANEOUS NEPHROLITHOTOMY Left 3/11/2024    Procedure: CYSTOSCOPY, LEFT PERCUTANEOUS NEPHROLITHOTOMY;  Surgeon: Kevin Quinn MD;  Location: [facility] OR    THYROID BIOPSY      TUBAL LIGATION         Medications   I have reviewed this patient's current medications  Medications Prior to Admission   Medication Sig Dispense Refill Last Dose    amLODIPine (NORVASC) 5 MG tablet Take 5 mg by  mouth daily   3/10/2024 at am    aspirin (ASA) 81 MG chewable tablet Take 81 mg by mouth daily   Past Month at held for 2 weeks am    atorvastatin (LIPITOR) 20 MG tablet Take 60 mg by mouth every evening   Past Month at held for 2 weeks am    benazepril (LOTENSIN) 40 MG tablet Take 40 mg by mouth daily   3/10/2024 at am    calcium carbonate (OS-VONNIE) 1500 (600 Ca) MG tablet Take 1 tablet by mouth daily   Past Month at held for 2 weeks am    calcium-magnesium (CALMAG) 500-250 MG TABS per tablet Take 1 tablet by mouth daily (before supper)   Past Month at held for 2 weeks am    cholecalciferol (VITAMIN D3) 125 mcg (5000 units) capsule Take 250 mcg by mouth daily (before supper)   Past Month at held for 2 weeks am    cinnamon 500 MG CAPS Take 1 tablet by mouth daily   Past Month at held for 2 weeks am    co-enzyme Q-10 100 MG CAPS capsule Take 100 mg by mouth daily (before supper)   Past Month at held for 2 weeks am    cyanocobalamin (CYANOCOBALAMIN) 1000 MCG/ML injection Inject 1 mL into the muscle every 7 days   Past Week at 3/6/24    erythromycin (ROMYCIN) 5 MG/GM ophthalmic ointment Apply 1 strip to eye as needed Erythromycin ophthalmic ointment 0.05% apply 1 strip to both eyes 6 times a daily prn   More than a month at prn    fluticasone (FLONASE) 50 MCG/ACT nasal spray Spray 1 spray into both nostrils daily as needed   3/10/2024 at am    glimepiride (AMARYL) 2 MG tablet Take 2 mg by mouth daily (before lunch)   3/10/2024 at late morning    LORazepam (ATIVAN) 2 MG tablet Take 2 mg by mouth every 6 hours as needed for anxiety   Past Week at prn    melatonin 1 MG TABS tablet Take 1 mg by mouth nightly as needed for sleep   Past Week at prn    Multiple Vitamin (MULTIVITAMIN ADULT PO) Take 1 tablet by mouth daily   Past Month at held for 2 weeks am    ondansetron (ZOFRAN ODT) 4 MG ODT tab Take 1 tablet (4 mg) by mouth every 6 hours as needed for nausea or vomiting 30 tablet 0 Past Week at prn    pantoprazole (PROTONIX)  40 MG EC tablet Take 1 tablet (40 mg) by mouth 2 times daily (before meals) 60 tablet 0 3/10/2024 at pm    potassium chloride (KLOR-CON) 20 MEQ packet Take 20 mEq by mouth 2 times daily   Past Week at am    sucralfate (CARAFATE) 1 GM/10ML suspension Take 1 g by mouth 3 times daily (before meals)   Past Week    traZODone (DESYREL) 100 MG tablet Take 100 mg by mouth nightly as needed for sleep   Past Week at pm             Physical Exam   Vital Signs: Temp: 98.3  F (36.8  C) Temp src: Oral BP: (!) 169/76 Pulse: 81   Resp: 18 SpO2: 100 % O2 Device: Nasal cannula Oxygen Delivery: 1 LPM  Weight: 134 lbs 12.8 oz    General Appearance: NAD, well appearing  Respiratory: CTAB  Cardiovascular: RRR. No m/r/g  GI: Soft. NT/ND  Skin: No rashes no lower extremities  Ext: No LE edema    Medical Decision Making       50 MINUTES SPENT BY ME on the date of service doing chart review, history, exam, documentation & further activities per the note.      Data     I have personally reviewed the following data over the past 24 hrs:    9.3  \   11.7   / 368     138 107 8.5 /  79   3.7 23 0.93 \     INR:  1.11 PTT:  N/A   D-dimer:  N/A Fibrinogen:  N/A       Imaging results reviewed over the past 24 hrs:   No results found for this or any previous visit (from the past 24 hour(s)).

## 2024-03-13 NOTE — ANESTHESIA PROCEDURE NOTES
Airway         Procedure Start/Stop Times: 3/13/2024 8:30 AM  Staff -        CRNA: Teresa Agosto APRN CRNA       Performed By: CRNAIndications and Patient Condition       Indications for airway management: manisha-procedural       Induction type:intravenous       Mask difficulty assessment: 4 - unable to mask vent +/- NMBA    Final Airway Details       Final airway type: endotracheal airway       Successful airway: ETT - single  Endotracheal Airway Details        ETT size (mm): 7.0       Cuffed: yes       Successful intubation technique: direct laryngoscopy       DL Blade Type: MAC 3       Grade View of Cords: 1       Adjucts: stylet       Position: Right       Measured from: gums/teeth       Secured at (cm): 22       Bite block used: Oral Airway    Post intubation assessment        Placement verified by: capnometry, equal breath sounds and chest rise        Number of attempts at approach: 1       Number of other approaches attempted: 0       Secured with: tape       Ease of procedure: easy       Dentition: Intact    Medication(s) Administered   Medication Administration Time: 3/13/2024 8:30 AM

## 2024-03-13 NOTE — ANESTHESIA POSTPROCEDURE EVALUATION
Patient: Dotty Villanueva    Procedure: Procedure(s):  REMOVAL, GASTRIC BAND, ADJUSTABLE, LAPAROSCOPIC, LYSIS OF ADHESIONS  ESOPHAGOGASTRODUODENOSCOPY, WITH DILATION       Anesthesia Type:  General    Note:  Disposition: Admission   Postop Pain Control: Uneventful            Sign Out: Well controlled pain   PONV: No   Neuro/Psych: Uneventful            Sign Out: Acceptable/Baseline neuro status   Airway/Respiratory: Uneventful            Sign Out: Acceptable/Baseline resp. status   CV/Hemodynamics: Uneventful            Sign Out: Acceptable CV status; No obvious hypovolemia; No obvious fluid overload   Other NRE: NONE   DID A NON-ROUTINE EVENT OCCUR? No           Last vitals:  Vitals Value Taken Time   /81 03/13/24 1145   Temp 36.5  C (97.7  F) 03/13/24 1145   Pulse 77 03/13/24 1147   Resp 12 03/13/24 1147   SpO2 100 % 03/13/24 1147   Vitals shown include unfiled device data.    Electronically Signed By: Yakov Holland MD  March 13, 2024  2:08 PM

## 2024-03-13 NOTE — PLAN OF CARE
Problem: Adult Inpatient Plan of Care  Goal: Absence of Hospital-Acquired Illness or Injury  Outcome: Progressing  Intervention: Identify and Manage Fall Risk  Recent Flowsheet Documentation  Taken 3/13/2024 0442 by Daxa Beebe RN  Safety Promotion/Fall Prevention:   activity supervised   clutter free environment maintained  Taken 3/13/2024 0110 by Daxa Beebe RN  Safety Promotion/Fall Prevention:   activity supervised   clutter free environment maintained  Intervention: Prevent Skin Injury  Recent Flowsheet Documentation  Taken 3/13/2024 0442 by Daxa Beebe RN  Body Position: position changed independently  Taken 3/13/2024 0110 by Daxa Beebe RN  Body Position: position changed independently  Intervention: Prevent and Manage VTE (Venous Thromboembolism) Risk  Recent Flowsheet Documentation  Taken 3/13/2024 0442 by Daxa Beebe RN  VTE Prevention/Management:   SCDs (sequential compression devices) off   patient refused intervention  Taken 3/13/2024 0110 by Daxa Beebe RN  VTE Prevention/Management:   SCDs (sequential compression devices) off   patient refused intervention  Intervention: Prevent Infection  Recent Flowsheet Documentation  Taken 3/13/2024 0442 by Daxa Beebe RN  Infection Prevention:   hand hygiene promoted   rest/sleep promoted  Taken 3/13/2024 0110 by Daxa Beebe RN  Infection Prevention:   hand hygiene promoted   rest/sleep promoted  Goal: Optimal Comfort and Wellbeing  Outcome: Progressing     Problem: Pain Acute  Goal: Optimal Pain Control and Function  Outcome: Progressing  Intervention: Prevent or Manage Pain  Recent Flowsheet Documentation  Taken 3/13/2024 0442 by Daxa Beebe RN  Sensory Stimulation Regulation:   care clustered   television on  Medication Review/Management: medications reviewed  Taken 3/13/2024 0110 by Daxa Beebe RN  Sensory Stimulation Regulation:   care clustered   television  on  Medication Review/Management: medications reviewed     Problem: Fall Injury Risk  Goal: Absence of Fall and Fall-Related Injury  Outcome: Progressing  Intervention: Identify and Manage Contributors  Recent Flowsheet Documentation  Taken 3/13/2024 0442 by Daxa Beebe RN  Medication Review/Management: medications reviewed  Taken 3/13/2024 0110 by Daxa Beebe RN  Medication Review/Management: medications reviewed  Intervention: Promote Injury-Free Environment  Recent Flowsheet Documentation  Taken 3/13/2024 0442 by Daxa Beebe RN  Safety Promotion/Fall Prevention:   activity supervised   clutter free environment maintained  Taken 3/13/2024 0110 by Daxa Beebe RN  Safety Promotion/Fall Prevention:   activity supervised   clutter free environment maintained     Problem: Risk for Delirium  Goal: Optimal Coping  Outcome: Progressing  Goal: Improved Behavioral Control  Outcome: Progressing  Intervention: Minimize Safety Risk  Recent Flowsheet Documentation  Taken 3/13/2024 0442 by Daxa Beebe RN  Communication Enhancement Strategies: call light answered in person  Enhanced Safety Measures: room near unit station  Taken 3/13/2024 0110 by Daxa Beebe RN  Communication Enhancement Strategies: call light answered in person  Enhanced Safety Measures: room near unit station  Goal: Improved Sleep  Outcome: Progressing   Goal Outcome Evaluation:       Pt is A&O x 4, VSS, except BP in 170s, on RA. NPO since midnight NG tube on intermittent suction, output 300 ml. Administered Levofloxacin and Infusing NS 75 ml/hr. Oliveira in place with output approx.1375 ml. SBA. Continue to monitor pt for changes.

## 2024-03-13 NOTE — PROGRESS NOTES
"  [facility] - POSTOP PROGRESS NOTE    PLACE OF SERVICE:  [facility]     SURGERY: POD #2 after left PCNL with left ureteral stent by Dr Kevin Quinn for left nephrolithiasis     SUBJECTIVE:   Events: S/p gastric band removal and EGD with dilation today. C/o abdominal pain 2/2 surgery. Denies SP and bilateral flank pain, fever, chills. Chapman was maintained yesterday for surgery today. Pt would like to have the chapman removed later this silvana or tomorrow AM, depending on post-op pain and mobility.     OBJECTIVE:  PHYSICAL EXAM  Vital signs:  Temp: 98.3  F (36.8  C) Temp src: Oral BP: (!) 169/76 Pulse: 81   Resp: 18 SpO2: 100 % O2 Device: Nasal cannula Oxygen Delivery: 2.5 LPM Height: 160 cm (5' 3\") (stated) Weight: 61.1 kg (134 lb 12.8 oz) (scale)  Estimated body mass index is 23.88 kg/m  as calculated from the following:    Height as of this encounter: 1.6 m (5' 3\").    Weight as of this encounter: 61.1 kg (134 lb 12.8 oz).    General: NAD, alert, cooperative  Neuro: A&O x 3. Moving all extremities equally.   Resp: Reg resp rate/depth.   Abdomen: Dressing dry, abdomen tender, no rebound or peritoneal signs. No flank tenderness. Perc site with dry dressing, no palpable hematoma.   : Chapman draining translucent yellow urine, good output.     LABS:  INR   Date Value Ref Range Status   03/13/2024 1.11 0.85 - 1.15 Final      Lab Results   Component Value Date    WBC 9.3 03/13/2024     Lab Results   Component Value Date    RBC 4.06 03/13/2024     Lab Results   Component Value Date    HGB 11.7 03/13/2024     Lab Results   Component Value Date    HCT 36.8 03/13/2024     No components found for: \"MCT\"  Lab Results   Component Value Date    MCV 91 03/13/2024     Lab Results   Component Value Date    MCH 28.8 03/13/2024     Lab Results   Component Value Date    MCHC 31.8 03/13/2024     Lab Results   Component Value Date    RDW 13.2 03/13/2024     Lab Results   Component Value Date     03/13/2024 "       Creatinine   Date Value Ref Range Status   03/13/2024 0.93 0.51 - 0.95 mg/dL Final     Sodium   Date Value Ref Range Status   03/13/2024 138 135 - 145 mmol/L Final     Comment:     Reference intervals for this test were updated on 09/26/2023 to more accurately reflect our healthy population. There may be differences in the flagging of prior results with similar values performed with this method. Interpretation of those prior results can be made in the context of the updated reference intervals.      Potassium   Date Value Ref Range Status   03/13/2024 3.7 3.4 - 5.3 mmol/L Final     Magnesium   Date Value Ref Range Status   03/12/2024 1.8 1.7 - 2.3 mg/dL Final     UC 3/12: no growth  BC 3/12: NGTD    Lab Results: personally reviewed.     ASSESSMENT/PLAN:  POD #2 after left PCNL with left ureteral stent by Dr Kevin Quinn for left nephrolithiasis     - S/p gastric band removal, HAYDEN and EGD with dilation today by Dr. Mabry.   - Diet - per Surgery recs.   - Creatinine 0.93 today.   - WBC 9.3. Afebrile. UC: No growth. BC: NGTD. Receiving IV levaquin. UTI symptoms prior to admission. Given current ureteral stent, recommend completing 7 day course of antibiotic.   - Catheter - Timing of removal per Surgery/primary team. Check PVRs once removed.   - Consider tamsulosin 0.4 mg po daily if signs of stent intolerance.   - Will continue to follow.     Updated: Dr. Quinn.       Asif Simpson, APRN, CNP  [facility]   [phone]

## 2024-03-13 NOTE — ANESTHESIA CARE TRANSFER NOTE
Patient: Dotty Villanueva    Procedure: Procedure(s):  REMOVAL, GASTRIC BAND, ADJUSTABLE, LAPAROSCOPIC, LYSIS OF ADHESIONS  ESOPHAGOGASTRODUODENOSCOPY, WITH DILATION       Diagnosis: Gastric outlet obstruction [K31.1]  Diagnosis Additional Information: No value filed.    Anesthesia Type:   General     Note:      Level of Consciousness: drowsy  Oxygen Supplementation: face mask  Level of Supplemental Oxygen (L/min / FiO2): 8  Independent Airway: airway patency satisfactory and stable  Dentition: dentition unchanged  Vital Signs Stable: post-procedure vital signs reviewed and stable  Report to RN Given: handoff report given  Patient transferred to: PACU    Handoff Report: Identifed the Patient, Identified the Reponsible Provider, Reviewed the pertinent medical history, Discussed the surgical course, Reviewed Intra-OP anesthesia mangement and issues during anesthesia, Set expectations for post-procedure period and Allowed opportunity for questions and acknowledgement of understanding      Vitals:  Vitals Value Taken Time   /80 03/13/24 1100   Temp 36.4  C (97.6  F) 03/13/24 1059   Pulse 102 03/13/24 1101   Resp 19 03/13/24 1101   SpO2 100 % 03/13/24 1101   Vitals shown include unfiled device data.    Electronically Signed By: KIRSTIN Joaquin CRNA  March 13, 2024  11:03 AM

## 2024-03-13 NOTE — ANESTHESIA PREPROCEDURE EVALUATION
Anesthesia Pre-Procedure Evaluation    Patient: Dotty Villanueva   MRN: [id number] : 1950        Procedure : Procedure(s):  REMOVAL GASTRIC BAND          Past Medical History:   Diagnosis Date    Anesthesia complication     O2 levels get low    Esophagitis     Essential hypertension     Gastroesophageal reflux disease with esophagitis     History of skin cancer     HTN (hypertension)     Hydronephrosis of left kidney     Hyperlipidemia     Kidney stone     2024    Malignant neoplasm of skin 2018    Formatting of this note might be different from the original. 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 Observe    Osteopenia     Pulmonary nodules 2016    Formatting of this note might be different from the original. Stable on CT for 24 months, per [facility], no follow up needed 2016    Pure hypercholesterolemia     Renal calculi 2017    Stage 3 chronic kidney disease (H) 10/30/2023    Formatting of this note might be different from the original. 3a    Toxic multinodular goiter 10/30/2023    Formatting of this note might be different from the original. partially supressed TSH --0.08;0.18 ; 0.11 10/07 TSH supressed  bilateral > 2cm dominant nodules- on us - negative fna bilateral  13% uptake with cold nodule right  51 mCi -08    Type 2 diabetes mellitus (H)     Vitamin D deficiency       Past Surgical History:   Procedure Laterality Date    APPENDECTOMY      CHOLECYSTECTOMY      ESOPHAGOSCOPY, GASTROSCOPY, DUODENOSCOPY (EGD), COMBINED N/A 2023    Procedure: ESOPHAGOGASTRODUODENOSCOPY WITH BIOPSY AND BALLOON DILATION;  Surgeon: Mika Leon MD;  Location: [facility] Main OR    HAND CONTRACTURE RELEASE Left     HYSTERECTOMY      Complicated by bowel laceration.  Required colonic repair.    IR NEPHROLITHOTOMY  3/11/2024    LAPAROSCOPIC CONVERTED TO OPEN ABDOMINAL HYSTERECTOMY WITH TRANSVERSE COLON REPAIR      bowel laceration    LAPAROSCOPIC  Pt to PED with c/o body aches, stuffy nose, and headache starting monday, states sore throat yesterday. Mom states Pt plays soccer, around teammate over the weekend who tested positive for Covid today, Pt tested negative rapid and PCR for Covid on Tuesday. +PO, +UOP. No n/v/d, no fever. Lungs cTA, denies SOB.   GASTRIC BANDING  2000    LEEP TX, CERVICAL      THYROID BIOPSY      TUBAL LIGATION        Allergies   Allergen Reactions    Enalapril Cough    Morphine Rash     And itching    Penicillins Nausea and Vomiting and Rash      Social History     Tobacco Use    Smoking status: Never    Smokeless tobacco: Never   Substance Use Topics    Alcohol use: Yes     Comment: Rare      Wt Readings from Last 1 Encounters:   03/11/24 61.1 kg (134 lb 12.8 oz)        Anesthesia Evaluation   Pt has had prior anesthetic.     History of anesthetic complications  - .      ROS/MED HX  ENT/Pulmonary:  - neg pulmonary ROS     Neurologic:  - neg neurologic ROS     Cardiovascular:     (+) Dyslipidemia hypertension- -   -  - -                                      METS/Exercise Tolerance: >4 METS    Hematologic:  - neg hematologic  ROS     Musculoskeletal:  - neg musculoskeletal ROS     GI/Hepatic:     (+) GERD, Asymptomatic on medication,                  Renal/Genitourinary:     (+) renal disease, type: CRI,            Endo:     (+)  type II DM,        thyroid problem,            Psychiatric/Substance Use:  - neg psychiatric ROS     Infectious Disease:  - neg infectious disease ROS     Malignancy:  - neg malignancy ROS     Other:  - neg other ROS          Physical Exam    Airway  airway exam normal    Comment: NGT in situ.    Mallampati: II   TM distance: > 3 FB   Neck ROM: full   Mouth opening: > 3 cm    Respiratory Devices and Support         Dental  no notable dental history     (+) Minor Abnormalities - some fillings, tiny chips      Cardiovascular   cardiovascular exam normal          Pulmonary   pulmonary exam normal                OUTSIDE LABS:  CBC:   Lab Results   Component Value Date    WBC 8.9 03/12/2024    WBC 8.7 10/30/2023    HGB 11.6 (L) 03/12/2024    HGB 13.4 10/30/2023    HCT 36.2 03/12/2024    HCT 41.5 10/30/2023     03/12/2024     10/30/2023     BMP:   Lab Results   Component Value Date     03/12/2024    NA  "140 11/02/2023    POTASSIUM 4.4 03/12/2024    POTASSIUM 3.9 11/02/2023    CHLORIDE 103 03/12/2024    CHLORIDE 110 (H) 11/02/2023    CO2 24 03/12/2024    CO2 24 11/02/2023    BUN 11.4 03/12/2024    BUN 5.1 (L) 11/02/2023    CR 0.96 (H) 03/12/2024    CR 0.89 11/02/2023     (H) 03/12/2024    GLC 84 03/12/2024     COAGS: No results found for: \"PTT\", \"INR\", \"FIBR\"  POC: No results found for: \"BGM\", \"HCG\", \"HCGS\"  HEPATIC:   Lab Results   Component Value Date    ALBUMIN 3.1 (L) 03/12/2024    PROTTOTAL 5.8 (L) 03/12/2024    ALT 9 03/12/2024    AST 11 03/12/2024    ALKPHOS 64 03/12/2024    BILITOTAL 0.5 03/12/2024     OTHER:   Lab Results   Component Value Date    LACT 1.7 03/12/2024    A1C 6.5 (H) 10/30/2023    VONNIE 10.1 03/12/2024    MAG 1.8 03/12/2024    LIPASE 13 10/30/2023    TSH 1.42 03/12/2024       Anesthesia Plan    ASA Status:  2    NPO Status:  NPO Appropriate    Anesthesia Type: General.     - Airway: ETT   Induction: Intravenous, Propofol, RSI.   Maintenance: Balanced.        Consents    Anesthesia Plan(s) and associated risks, benefits, and realistic alternatives discussed. Questions answered and patient/representative(s) expressed understanding.     - Discussed:     - Discussed with:  Patient      - Extended Intubation/Ventilatory Support Discussed: No.      - Patient is DNR/DNI Status: No     Use of blood products discussed: No .     Postoperative Care    Pain management: IV analgesics, Multi-modal analgesia.     - Plan for long acting post-op opioid use   PONV prophylaxis: Ondansetron (or other 5HT-3), Dexamethasone or Solumedrol     Comments:    Other Comments: Decadron, Zofran.  Diprivan infusion.  Toradol, IV Tylenol.  Ketamine (0.5 mg/kg).  Magnesium.    NGT to suction.             Yakov Holland MD    I have reviewed the pertinent notes and labs in the chart from the past 30 days and (re)examined the patient.  Any updates or changes from those notes are reflected in this note.          # " Hypoalbuminemia: Lowest albumin = 3.1 g/dL at 3/12/2024  3:12 AM, will monitor as appropriate    # Drug Induced Platelet Defect: home medication list includes an antiplatelet medication  # DMII: A1C = N/A within past 6 months

## 2024-03-14 VITALS
DIASTOLIC BLOOD PRESSURE: 80 MMHG | RESPIRATION RATE: 18 BRPM | BODY MASS INDEX: 23.88 KG/M2 | HEIGHT: 63 IN | TEMPERATURE: 97.6 F | WEIGHT: 134.8 LBS | HEART RATE: 65 BPM | OXYGEN SATURATION: 96 % | SYSTOLIC BLOOD PRESSURE: 164 MMHG

## 2024-03-14 LAB
ANION GAP SERPL CALCULATED.3IONS-SCNC: 2 MMOL/L (ref 7–15)
BASOPHILS # BLD AUTO: 0 10E3/UL (ref 0–0.2)
BASOPHILS NFR BLD AUTO: 0 %
BUN SERPL-MCNC: 7.4 MG/DL (ref 8–23)
CALCIUM SERPL-MCNC: 9.5 MG/DL (ref 8.8–10.2)
CHLORIDE SERPL-SCNC: 104 MMOL/L (ref 98–107)
CREAT SERPL-MCNC: 0.85 MG/DL (ref 0.51–0.95)
DEPRECATED HCO3 PLAS-SCNC: 31 MMOL/L (ref 22–29)
EGFRCR SERPLBLD CKD-EPI 2021: 72 ML/MIN/1.73M2
EOSINOPHIL # BLD AUTO: 0 10E3/UL (ref 0–0.7)
EOSINOPHIL NFR BLD AUTO: 0 %
ERYTHROCYTE [DISTWIDTH] IN BLOOD BY AUTOMATED COUNT: 13.3 % (ref 10–15)
GLUCOSE BLDC GLUCOMTR-MCNC: 147 MG/DL (ref 70–99)
GLUCOSE BLDC GLUCOMTR-MCNC: 169 MG/DL (ref 70–99)
GLUCOSE BLDC GLUCOMTR-MCNC: 241 MG/DL (ref 70–99)
GLUCOSE SERPL-MCNC: 180 MG/DL (ref 70–99)
HCT VFR BLD AUTO: 34.2 % (ref 35–47)
HGB BLD-MCNC: 10.8 G/DL (ref 11.7–15.7)
IMM GRANULOCYTES # BLD: 0 10E3/UL
IMM GRANULOCYTES NFR BLD: 0 %
LYMPHOCYTES # BLD AUTO: 1 10E3/UL (ref 0.8–5.3)
LYMPHOCYTES NFR BLD AUTO: 10 %
MCH RBC QN AUTO: 28.5 PG (ref 26.5–33)
MCHC RBC AUTO-ENTMCNC: 31.6 G/DL (ref 31.5–36.5)
MCV RBC AUTO: 90 FL (ref 78–100)
MONOCYTES # BLD AUTO: 0.8 10E3/UL (ref 0–1.3)
MONOCYTES NFR BLD AUTO: 8 %
NEUTROPHILS # BLD AUTO: 7.6 10E3/UL (ref 1.6–8.3)
NEUTROPHILS NFR BLD AUTO: 82 %
NRBC # BLD AUTO: 0 10E3/UL
NRBC BLD AUTO-RTO: 0 /100
PLATELET # BLD AUTO: 356 10E3/UL (ref 150–450)
POTASSIUM SERPL-SCNC: 4.1 MMOL/L (ref 3.4–5.3)
RBC # BLD AUTO: 3.79 10E6/UL (ref 3.8–5.2)
SODIUM SERPL-SCNC: 137 MMOL/L (ref 135–145)
WBC # BLD AUTO: 9.4 10E3/UL (ref 4–11)

## 2024-03-14 PROCEDURE — 250N000013 HC RX MED GY IP 250 OP 250 PS 637: Performed by: UROLOGY

## 2024-03-14 PROCEDURE — 250N000011 HC RX IP 250 OP 636: Performed by: SURGERY

## 2024-03-14 PROCEDURE — 85025 COMPLETE CBC W/AUTO DIFF WBC: CPT | Performed by: SURGERY

## 2024-03-14 PROCEDURE — 250N000013 HC RX MED GY IP 250 OP 250 PS 637: Performed by: STUDENT IN AN ORGANIZED HEALTH CARE EDUCATION/TRAINING PROGRAM

## 2024-03-14 PROCEDURE — 250N000013 HC RX MED GY IP 250 OP 250 PS 637: Performed by: SURGERY

## 2024-03-14 PROCEDURE — 258N000003 HC RX IP 258 OP 636: Performed by: SURGERY

## 2024-03-14 PROCEDURE — 99239 HOSP IP/OBS DSCHRG MGMT >30: CPT | Performed by: STUDENT IN AN ORGANIZED HEALTH CARE EDUCATION/TRAINING PROGRAM

## 2024-03-14 PROCEDURE — 250N000012 HC RX MED GY IP 250 OP 636 PS 637: Performed by: STUDENT IN AN ORGANIZED HEALTH CARE EDUCATION/TRAINING PROGRAM

## 2024-03-14 PROCEDURE — 250N000011 HC RX IP 250 OP 636: Performed by: STUDENT IN AN ORGANIZED HEALTH CARE EDUCATION/TRAINING PROGRAM

## 2024-03-14 PROCEDURE — 36415 COLL VENOUS BLD VENIPUNCTURE: CPT | Performed by: SURGERY

## 2024-03-14 PROCEDURE — 80048 BASIC METABOLIC PNL TOTAL CA: CPT | Performed by: SURGERY

## 2024-03-14 RX ORDER — AMOXICILLIN 250 MG
2 CAPSULE ORAL 2 TIMES DAILY PRN
Qty: 28 TABLET | Refills: 0 | Status: SHIPPED | OUTPATIENT
Start: 2024-03-14 | End: 2024-03-21

## 2024-03-14 RX ORDER — OXYCODONE HYDROCHLORIDE 5 MG/1
5 TABLET ORAL EVERY 6 HOURS PRN
Qty: 8 TABLET | Refills: 0 | Status: SHIPPED | OUTPATIENT
Start: 2024-03-14 | End: 2024-03-16

## 2024-03-14 RX ORDER — LEVOFLOXACIN 750 MG/1
750 TABLET, FILM COATED ORAL DAILY
Qty: 4 TABLET | Refills: 0 | Status: SHIPPED | OUTPATIENT
Start: 2024-03-15 | End: 2024-03-19

## 2024-03-14 RX ADMIN — SUCRALFATE 1 G: 1 SUSPENSION ORAL at 06:08

## 2024-03-14 RX ADMIN — PANTOPRAZOLE SODIUM 40 MG: 40 TABLET, DELAYED RELEASE ORAL at 06:08

## 2024-03-14 RX ADMIN — HEPARIN SODIUM 5000 UNITS: 5000 INJECTION, SOLUTION INTRAVENOUS; SUBCUTANEOUS at 12:35

## 2024-03-14 RX ADMIN — LORAZEPAM 1 MG: 1 TABLET ORAL at 00:38

## 2024-03-14 RX ADMIN — HEPARIN SODIUM 5000 UNITS: 5000 INJECTION, SOLUTION INTRAVENOUS; SUBCUTANEOUS at 06:25

## 2024-03-14 RX ADMIN — ACETAMINOPHEN 1000 MG: 325 SOLUTION ORAL at 08:15

## 2024-03-14 RX ADMIN — LEVOFLOXACIN 500 MG: 500 INJECTION, SOLUTION INTRAVENOUS at 06:06

## 2024-03-14 RX ADMIN — SUCRALFATE 1 G: 1 SUSPENSION ORAL at 11:40

## 2024-03-14 RX ADMIN — AMLODIPINE BESYLATE 5 MG: 5 TABLET ORAL at 08:15

## 2024-03-14 RX ADMIN — LISINOPRIL 20 MG: 20 TABLET ORAL at 09:37

## 2024-03-14 RX ADMIN — INSULIN ASPART 1 UNITS: 100 INJECTION, SOLUTION INTRAVENOUS; SUBCUTANEOUS at 12:35

## 2024-03-14 RX ADMIN — INSULIN ASPART 1 UNITS: 100 INJECTION, SOLUTION INTRAVENOUS; SUBCUTANEOUS at 10:08

## 2024-03-14 RX ADMIN — FAMOTIDINE 20 MG: 20 TABLET ORAL at 08:15

## 2024-03-14 RX ADMIN — POTASSIUM CHLORIDE, DEXTROSE MONOHYDRATE AND SODIUM CHLORIDE: 150; 5; 450 INJECTION, SOLUTION INTRAVENOUS at 00:34

## 2024-03-14 ASSESSMENT — ACTIVITIES OF DAILY LIVING (ADL)
ADLS_ACUITY_SCORE: 25
ADLS_ACUITY_SCORE: 26
ADLS_ACUITY_SCORE: 25
ADLS_ACUITY_SCORE: 26

## 2024-03-14 NOTE — PROGRESS NOTES
Care Management Discharge Note    Discharge Date: 03/14/2024       Discharge Disposition: Home    Discharge Services: None    Discharge DME: None      Additional Information:  Discharge anticipated. No CM needs identified.    MEHDI Nuñez

## 2024-03-14 NOTE — DISCHARGE SUMMARY
[facility]  Hospitalist Discharge Summary      Date of Admission:  3/11/2024  Date of Discharge:  3/14/2024  1:18 PM  Discharging Provider: THADDEUS MCMAHON MD  Discharge Service: Hospitalist Service    Discharge Diagnoses     #Severe gastroparesis  #History of gastric lap band with gastric obstruction  #Left-sided nephrolithiasis   #UTI   #Lactic acidosis   #Type 2 DM without long-term insulin use   #Anxiety   #HTN   #GERD     Clinically Significant Risk Factors     # DMII: A1C = N/A within past 6 months       Follow-ups Needed After Discharge   Follow-up Appointments     Follow-up and recommended labs and tests       Follow up with Urology and general surgery as they recommend.            Unresulted Labs Ordered in the Past 30 Days of this Admission       Date and Time Order Name Status Description    3/13/2024  9:49 AM Surgical Pathology Exam In process     3/12/2024  2:18 AM Blood Culture Hand, Right Preliminary     3/12/2024  2:18 AM Blood Culture Hand, Right Preliminary     3/11/2024  9:50 AM Stone analysis In process         These results will be followed up by hospitalist, urology, surgery    Discharge Disposition   Discharged to home  Condition at discharge: Stable    Hospital Course   Dotty Villanueva is a 73 year old female with PMHx of DM2 c/b gastroparesis, gastric banding in 2000, HTN, vitamin D deficiency, nephrolithiasis who presents for post-op admission after a left-sided percutaneous nephrolithotomy and cystoscopy with left ureteral stent placed. Now undergoing gastric lap band removal.    #Severe gastroparesis  #History of gastric lap band with gastric obstruction  Gastric lap band placed 2000. The patient reports living on soups and toast for at least the past year due to difficulty with N/V when eating regular foods. She reports losing significant weight during this time. Was admitted at [facility] 11/2023 for these symptoms. 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 Ultimately the patient's lap band is now obstructing the flow of food through her stomach. She may not have gastroparesis as the lap band may have been confounding the results.  - N/V post-op 3/11 led to surgery consult  - S/p lab band removal with Dr. Mabry 3/13, also with intra-op EGD and gastric stricture dilation  - NGT for decompression pre-op to reduce risk of aspiration with anesthesia, removed post-op  - Intra-op EGD performed and dilation of gastric stricture  - FLD here tolerated, advance to soft diet as tolerated over the next week until she sees Dr. Mabry in clinic in 1 week    #Left-sided nephrolithiasis  Underwent percutaneous nephrolithotomy and cystoscopy with left ureteral stent placement on 3/11/24 with Urology. Doing well post-op.  - Urology following; appreciate expertise  - Oliveira per Urology/surgery, removed 3/13 post-op  - Pain controlled from this procedure    #UTI  Per Urology, would treat based on recent UA results and dysuria symptoms she was experiencing prior to admission and presence of ureteral stent  - Levofloxacin x7 days    #Lactic acidosis  Asymptomatic. Mild, probably due to recent surgery. Resolved with IVF. Perfusing well.    #Type 2 DM without long-term insulin use  Well controlled.  - Hold hold glyburide to avoid hypoglycemia, resume at discharge  - Sliding scale insulin added, but unlikely to need much, goal pre-prandial BG <180  - Continue home atorvastatin    #Anxiety  PDMP reviewed, recently prescribed Ativan 2mg q6h PRN, a hefty dose for this patient, but should continue.  - Continue home Ativan, but hold or reduce dose of any signs of sedation    #HTN  - Continue lisinopril as substituted for home benazepril, continue benazapril at discharge  - Continue home amlodipine    #GERD  History of esophagitis  - Continue home PPI BID, sucralfate      Consultations This Hospital Stay   HOSPITALIST  IP CONSULT  SURGERY GENERAL IP CONSULT    Code Status   Prior    Time Spent on this Encounter   I, THADDEUS MCMAHON MD, personally saw the patient today and spent greater than 30 minutes discharging this patient.       THADDEUS MCMAHON MD  [address] [address]  Phone: [phone]  Fax: [phone]  ______________________________________________________________________    Physical Exam   Vital Signs: Temp: 97.6  F (36.4  C) Temp src: Oral BP: (!) 164/80 Pulse: 65   Resp: 18 SpO2: 96 % O2 Device: None (Room air)    Weight: 134 lbs 12.8 oz    General Appearance:  NAD, well appearing  Respiratory: CTAB  Cardiovascular: RRR. No m/r/g  GI: Soft. NT/ND  Skin: No rashes no lower extremities  Ext: No LE edema       Primary Care Physician   Lilia Doyle    Discharge Orders      Reason for your hospital stay    You were admitted after surgery to remove left-sided kidney stones. This was successful. Afterwards, you had trouble with eating and the general surgeon decided it was time to remove your gastric lap band. This surgery was successful. Please advance your diet slowly at home.    Follow up with the Urologists and general surgeons as they recommend.     Follow-up and recommended labs and tests     Follow up with Urology and general surgery as they recommend.     Activity    Your activity upon discharge: no lifting over 20 pounds for two weeks     Diet    Follow this diet upon discharge: Orders Placed This Encounter      Full Liquid Diet for the next week and then advance slowly to soft diet; stay on soft diet until seen by Dr. Mabry       Significant Results and Procedures   Most Recent 3 CBC's:  Recent Labs   Lab Test 03/14/24  0648 03/13/24  0703 03/12/24  0427   WBC 9.4 9.3 8.9   HGB 10.8* 11.7 11.6*   MCV 90 91 89    368 405     Most Recent 3 BMP's:  Recent Labs   Lab Test 03/14/24  1225 03/14/24  0921 03/14/24  0648 03/13/24  1630  03/13/24  0703 03/12/24  0731 03/12/24  0312   NA  --   --  137  --  138  --  136   POTASSIUM  --   --  4.1  --  3.7  --  4.4   CHLORIDE  --   --  104  --  107  --  103   CO2  --   --  31*  --  23  --  24   BUN  --   --  7.4*  --  8.5  --  11.4   CR  --   --  0.85  --  0.93  --  0.96*   ANIONGAP  --   --  2*  --  8  --  9   VONNIE  --   --  9.5  --  9.6  --  10.1   * 169* 180*   < > 79   < > 202*    < > = values in this interval not displayed.   ,   Results for orders placed or performed during the hospital encounter of 03/11/24   XR Surgery KIRA G/T 5 Min Fluoro    Narrative    [facility]  LOCATION: [facility]  DATE: 3/11/2024    PROCEDURE: LEFT PERCUTANEOUS NEPHROLITHOTOMY PERFORMED IN CONJUNCTION WITH UROLOGY    INTERVENTIONAL RADIOLOGIST: AMBER Diallo MD.  UROLOGIST: Dr. Kai MD.    INDICATION: Large left renal pelvic stone.    SEDATION: General endotracheal anesthesia.    CONTRAST: 50 mL Omnipaque into the urinary system.  ANTIBIOTICS: Per anesthesia.  ADDITIONAL MEDICATIONS: None.    FLUOROSCOPIC TIME: 16.6 minutes.  RADIATION DOSE: Air Kerma: 158.3 mGy.    COMPLICATIONS: No immediate complications.    STERILE BARRIER TECHNIQUE: Maximum sterile barrier technique was used. Cutaneous antisepsis was performed at the operative site followed by placement of a large standard sterile drape. Prior to the procedure, the operators and assistants performed hand   hygiene and wore hat, mask, sterile gown, and sterile gloves during the entire procedure.    PROCEDURE: Prior to the interventional radiology portion of the procedure, Dr. Quinn placed a retrograde ureteral catheter into the renal pelvis. Multi projectional retrograde pyelography was performed through the ureteral catheter in order to better   define the complexity of the intrarenal collecting system, calculi location and overall burden, and determine the most appropriate initial percutaneous access to allow for  "nephrolithotomy. This demonstrated a large renal pelvic stone with contrast   filling multiple calyces. The most suitable site for percutaneous nephrolithotomy is deemed to be a posterior lower pole calyx.      Under direct fluoroscopic guidance, an AccuStick needle was inserted inserted into a posterior lower pole calyx of the left kidney from a low intercostal approach below the 12th rib. A wire was manipulated through the ureter and into the urinary bladder.   The tract was dilated, and a 10 [place] sheath was placed, through which a second \"safety\" wire was advanced into the urinary bladder. Over the initial wire, the percutaneous nephrostomy access was dilated with a 30 [place] NephroMax balloon, and a 30   [place] sheath was placed into the intrarenal collecting system.     Dr. Quinn performed percutaneous nephrolithotomy through the access sheath. Please refer to the dedicated urology note for specific details.    Following percutaneous nephrolithotomy, multi projectional fluoroscopic images were obtained to evaluate for any residual radiopaque calculi. This demonstrated no residual radiopaque calculi are identified.  A 5 [place] catheter was placed over one of the   wires into the renal pelvis. Antegrade pyelography was performed through the catheter to provide diagnostic information for ureteral stent placement. Over the second wire, an 8F x 24 cm ureteral stent was placed in an antegrade fashion. The distal loop   was formed within the urinary bladder and the proximal loop within the renal pelvis. The 5 [place] catheter was removed. A gelfoam slurry was instilled through the stent pusher as it was removed along the percutaneous pathway.     The completion images show the newly placed ureteral stent with its distal loop in the urinary bladder and proximal loop in the renal collecting system.      Impression    IMPRESSION:   1.  Successful percutaneous nephrostomy access for percutaneous nephrolithotomy, as " discussed above.   2.  Percutaneous nephrolithotomy. Please refer to the dedicated urologist procedure note for specific details.   3.  Successful antegrade placement of a ureteral stent which fluoroscopically is in appropriate position.     PLAN:  1. Patient to be recovered in PACU.  2. Remaining cares as per Urology.     XR Chest Port 1 View    Narrative    EXAM: CHEST 2 VIEWS  LOCATION: [facility]  DATE: 3/12/2024    INDICATION: Indigestion and bradycardia.  COMPARISON: None.    FINDINGS: A small band-like opacity at the lateral aspect of the left lung base was present previously and likely represents scarring. The lungs are otherwise clear. Normal size cardiac silhouette. Atherosclerotic calcification in the thoracic aorta.   Surgical clips in the upper right hemiabdomen likely relate to prior cholecystectomy.      Impression    IMPRESSION: No evidence of active cardiopulmonary disease.    CT Chest/Abdomen/Pelvis w Contrast    Narrative    EXAM: CT CHEST, ABDOMEN AND PELVIS WITH CONTRAST  LOCATION: [facility]  DATE/TIME: 3/12/2024 6:10 AM CDT    INDICATION: Indigestion. Bradycardia. Question sepsis.  COMPARISON:  10/30/2023 - CT abdomen and pelvis.  10/26/2015 - CT chest, abdomen and pelvis.    TECHNIQUE: CT scan of the chest, abdomen, and pelvis was performed following injection of IV contrast. Multiplanar reformats were obtained. Dose reduction techniques were used.  CONTRAST: 66 mL Isovue 370.    FINDINGS:    LUNGS AND PLEURA: 0.6 cm nodule in the anterior aspect of the left upper lobe (series 4 image 111), unchanged since 10/26/2015 and therefore consistent with benign etiology.    MEDIASTINUM/AXILLAE: A 4.2 x 2.9 cm calcified left lobe of thyroid nodule again noted. Atherosclerotic calcification in the aorta.    CORONARY ARTERY CALCIFICATION: Not definitely present.    HEPATOBILIARY: Prior cholecystectomy.    SPLEEN: Unremarkable.    PANCREAS: Mild to  moderate diffuse parenchymal atrophy.    ADRENAL GLANDS: Unremarkable.    KIDNEYS/BLADDER: No suspicious renal lesions. A left ureteral stent is present with proximal pigtail in the renal pelvis and distal pigtail in the urinary bladder lumen. No dilatation of the intrarenal collecting systems or ureters. Haziness is present   within the left peripelvic fat, similar in appearance to the comparison study dated 10/30/2023. A balloon tip catheter is present in the urinary bladder lumen.    BOWEL: The esophagus is mildly distended with fluid. Prior gastric surgery. Moderate distention of the proximal aspect of the stomach with fluid and a small amount of gas. The stomach distal to the site of surgery is decompressed. The duodenum, jejunum,   ileum and colon are normal in caliber. A few colonic diverticula are present without evidence of diverticulitis. The appendix is not visualized. No bowel wall thickening, pneumatosis or free intraperitoneal gas.    LYMPH NODES: No acute findings.    PELVIC ORGANS: No acute findings.    MUSCULOSKELETAL: No acute findings.    OTHER: A trace amount of free fluid in the pelvis.      Impression    IMPRESSION:   1.  Prior gastric surgery. There is mild distention of the esophagus with fluid and moderate distention of the proximal aspect of the stomach with fluid. The portion of the stomach distal to the site of surgery is decompressed. These findings are   suggestive of an obstruction within the stomach at the site of prior surgery.  2.  A trace amount of nonspecific free fluid in the pelvis.  3.  No other acute abnormality identified in the chest, abdomen or pelvis.  4.  A left ureteral stent is in place. Haziness is present within the fat about the left renal pelvis, where there is the proximal portion of the stent. This appearance was also present on 10/30/2023 and therefore is likely a long-standing finding,   possibly relating to chronic inflammation.                       Discharge  Medications   Discharge Medication List as of 3/14/2024 12:59 PM        START taking these medications    Details   levofloxacin (LEVAQUIN) 750 MG tablet Take 1 tablet (750 mg) by mouth daily for 4 days, Disp-4 tablet, R-0, E-Prescribe      oxyCODONE (ROXICODONE) 5 MG tablet Take 1 tablet (5 mg) by mouth every 6 hours as needed for pain, Disp-8 tablet, R-0, E-Prescribe      senna-docusate (SENOKOT-S/PERICOLACE) 8.6-50 MG tablet Take 2 tablets by mouth 2 times daily as needed for constipation, Disp-28 tablet, R-0, E-Prescribe           CONTINUE these medications which have NOT CHANGED    Details   amLODIPine (NORVASC) 5 MG tablet Take 5 mg by mouth daily, Historical      aspirin (ASA) 81 MG chewable tablet Take 81 mg by mouth daily, Historical      atorvastatin (LIPITOR) 20 MG tablet Take 60 mg by mouth every evening, Historical      benazepril (LOTENSIN) 40 MG tablet Take 40 mg by mouth daily, Historical      calcium carbonate (OS-VONNIE) 1500 (600 Ca) MG tablet Take 1 tablet by mouth daily, Historical      calcium-magnesium (CALMAG) 500-250 MG TABS per tablet Take 1 tablet by mouth daily (before supper), Historical      cholecalciferol (VITAMIN D3) 125 mcg (5000 units) capsule Take 250 mcg by mouth daily (before supper), Historical      cinnamon 500 MG CAPS Take 1 tablet by mouth daily, Historical      co-enzyme Q-10 100 MG CAPS capsule Take 100 mg by mouth daily (before supper), Historical      cyanocobalamin (CYANOCOBALAMIN) 1000 MCG/ML injection Inject 1 mL into the muscle every 7 days, Historical      erythromycin (ROMYCIN) 5 MG/GM ophthalmic ointment Apply 1 strip to eye as needed Erythromycin ophthalmic ointment 0.05% apply 1 strip to both eyes 6 times a daily prnHistorical      fluticasone (FLONASE) 50 MCG/ACT nasal spray Spray 1 spray into both nostrils daily as needed, Historical      glimepiride (AMARYL) 2 MG tablet Take 2 mg by mouth daily (before lunch), Historical      LORazepam (ATIVAN) 2 MG tablet Take  2 mg by mouth every 6 hours as needed for anxiety, Historical      melatonin 1 MG TABS tablet Take 1 mg by mouth nightly as needed for sleep, Historical      Multiple Vitamin (MULTIVITAMIN ADULT PO) Take 1 tablet by mouth daily, Historical      ondansetron (ZOFRAN ODT) 4 MG ODT tab Take 1 tablet (4 mg) by mouth every 6 hours as needed for nausea or vomiting, Disp-30 tablet, R-0, E-Prescribe      pantoprazole (PROTONIX) 40 MG EC tablet Take 1 tablet (40 mg) by mouth 2 times daily (before meals), Disp-60 tablet, R-0, E-Prescribe      potassium chloride (KLOR-CON) 20 MEQ packet Take 20 mEq by mouth 2 times daily, Historical      sucralfate (CARAFATE) 1 GM/10ML suspension Take 1 g by mouth 3 times daily (before meals), Historical      traZODone (DESYREL) 100 MG tablet Take 100 mg by mouth nightly as needed for sleep, Historical           Allergies   Allergies   Allergen Reactions    Enalapril Cough    Morphine Rash     And itching    Penicillins Nausea and Vomiting and Rash

## 2024-03-14 NOTE — PLAN OF CARE
Problem: Pain Acute  Goal: Optimal Pain Control and Function  Intervention: Develop Pain Management Plan  Recent Flowsheet Documentation  Taken 3/13/2024 2147 by Frances Dean RN  Pain Management Interventions: medication (see MAR)  Intervention: Prevent or Manage Pain  Recent Flowsheet Documentation  Taken 3/13/2024 2200 by Frances Dean RN  Sensory Stimulation Regulation:   care clustered   television on  Medication Review/Management: medications reviewed     Problem: Fall Injury Risk  Goal: Absence of Fall and Fall-Related Injury  Intervention: Identify and Manage Contributors  Recent Flowsheet Documentation  Taken 3/13/2024 2200 by Frances Dean RN  Medication Review/Management: medications reviewed  Intervention: Promote Injury-Free Environment  Recent Flowsheet Documentation  Taken 3/13/2024 2200 by Frances Dean RN  Safety Promotion/Fall Prevention: activity supervised   Goal Outcome Evaluation:       Pt alert and oriented X4, denies pain, feet dangle , incisional sites (abdomen and lower back) C/D/I, bowel sound active X4, CMS intact. IVF 75ml infusing, Rm air, chapman catheter in place with pink color output.

## 2024-03-14 NOTE — PROGRESS NOTES
"  [facility] - POSTOP PROGRESS NOTE    PLACE OF SERVICE:  [facility]     SURGERY: POD #3 after left PCNL with left ureteral stent by Dr Kevin Quinn for left nephrolithiasis     SUBJECTIVE:   Events: S/p gastric band removal and EGD with dilation 3/13. Notes minimal abdominal pain today. Denies SP and bilateral flank pain, fever, chills. Oliveira removed this AM, voiding without difficulty and PVR 17 ml. Reports urine light pink, no clots. Tolerating clear liquids this AM without nausea.     Gen Surgery planning discharge this afternoon if tolerating full liquids.     OBJECTIVE:  PHYSICAL EXAM  Vital signs:  Temp: 97.6  F (36.4  C) Temp src: Oral BP: (!) 164/80 Pulse: 65   Resp: 18 SpO2: 96 % O2 Device: None (Room air) Oxygen Delivery: 1 LPM Height: 160 cm (5' 3\") (stated) Weight: 61.1 kg (134 lb 12.8 oz) (scale)  Estimated body mass index is 23.88 kg/m  as calculated from the following:    Height as of this encounter: 1.6 m (5' 3\").    Weight as of this encounter: 61.1 kg (134 lb 12.8 oz).    General: NAD, alert, cooperative  Neuro: A&O x 3. Moving all extremities equally.   Resp: Reg resp rate/depth.   Abdomen: Dressing dry, abdomen tender, no rebound or peritoneal signs. No left flank tenderness. Perc site dressing removed, steri-strip dry and intact, no palpable hematoma.        LABS:  INR   Date Value Ref Range Status   03/13/2024 1.11 0.85 - 1.15 Final      Lab Results   Component Value Date    WBC 9.4 03/14/2024     Lab Results   Component Value Date    RBC 3.79 03/14/2024     Lab Results   Component Value Date    HGB 10.8 03/14/2024     Lab Results   Component Value Date    HCT 34.2 03/14/2024     No components found for: \"MCT\"  Lab Results   Component Value Date    MCV 90 03/14/2024     Lab Results   Component Value Date    MCH 28.5 03/14/2024     Lab Results   Component Value Date    MCHC 31.6 03/14/2024     Lab Results   Component Value Date    RDW 13.3 03/14/2024     Lab Results "   Component Value Date     03/14/2024       Creatinine   Date Value Ref Range Status   03/14/2024 0.85 0.51 - 0.95 mg/dL Final     Sodium   Date Value Ref Range Status   03/14/2024 137 135 - 145 mmol/L Final     Comment:     Reference intervals for this test were updated on 09/26/2023 to more accurately reflect our healthy population. There may be differences in the flagging of prior results with similar values performed with this method. Interpretation of those prior results can be made in the context of the updated reference intervals.      Potassium   Date Value Ref Range Status   03/14/2024 4.1 3.4 - 5.3 mmol/L Final     Magnesium   Date Value Ref Range Status   03/12/2024 1.8 1.7 - 2.3 mg/dL Final     UC 3/12: no growth  BC 3/12: NGTD    Lab Results: personally reviewed.     ASSESSMENT/PLAN:  POD #3 after left PCNL with left ureteral stent by Dr Kevin Quinn for left nephrolithiasis     - S/p gastric band removal, HAYDEN and EGD with dilation 3/13 by Dr. Mabry.   - Diet - per Surgery recs.   - Creatinine 0.85 today.   - WBC 9.4. Afebrile. UC: No growth. BC: NGTD. Receiving IV levaquin. UTI symptoms prior to admission. Given current ureteral stent, recommend completing 7 day course of antibiotic.   - Maintain follow up with [facility] as previously scheduled (to include stent removal).   - [facility] will sign off. Please re-consult with any new or worsening urologic concerns.    Updated: Dr. Quinn.     Asif Simpson, APRN, CNP  [facility]   [phone]

## 2024-03-14 NOTE — PROGRESS NOTES
Patient discharge paperwork gone over with patient.  Prescriptions filled and given to patient.  Has follow up appointment with urology and surgeon.  Will be on full liquid diet for next week and slowly advance to soft until sees surgeon

## 2024-03-14 NOTE — PLAN OF CARE
Problem: Adult Inpatient Plan of Care  Goal: Plan of Care Review  Description: The Plan of Care Review/Shift note should be completed every shift.  The Outcome Evaluation is a brief statement about your assessment that the patient is improving, declining, or no change.  This information will be displayed automatically on your shift  note.  Outcome: Progressing  Flowsheets (Taken 3/14/2024 1111)  Plan of Care Reviewed With: patient   Goal Outcome Evaluation:patient to go home when ready  Problem: Pain Acute  Goal: Optimal Pain Control and Function  Outcome: Progressing-understands pain scale (0-10) and medicate as per mar  Problem: Comorbidity Management  Goal: Blood Glucose Levels Within Targeted Range  Outcome: Progressing-monitoring before meals      Plan of Care Reviewed With: patient alert and oriented and able to make needs known.  Oliveira pulled this am and is voiding but is reddish color.  Patient on full liquid diet and tolerating.  Has IV fluids still running and hoping to go home today

## 2024-03-14 NOTE — PLAN OF CARE
"  Problem: Adult Inpatient Plan of Care  Goal: Plan of Care Review  Description: The Plan of Care Review/Shift note should be completed every shift.  The Outcome Evaluation is a brief statement about your assessment that the patient is improving, declining, or no change.  This information will be displayed automatically on your shift  note.  Outcome: Progressing  Flowsheets (Taken 3/13/2024 2134)  Plan of Care Reviewed With: patient  Overall Patient Progress: improving     Problem: Adult Inpatient Plan of Care  Goal: Patient-Specific Goal (Individualized)  Description: You can add care plan individualizations to a care plan. Examples of Individualization might be:  \"Parent requests to be called daily at 9am for status\", \"I have a hard time hearing out of my right ear\", or \"Do not touch me to wake me up as it startles  me\".  Outcome: Progressing   Goal Outcome Evaluation:      Plan of Care Reviewed With: patient    Overall Patient Progress: improvingOverall Patient Progress: improving  Pt is A&Ox4. Clear liquid diet. Pt has chapman cath. The urine is pink. Pt denies pain. Has shortness of breath. Refuse capnography.     Pt will be transfer to P4 room 409. Report Given to Frances DAI.   Cloths & belongings stay with the pt.         "

## 2024-03-14 NOTE — PROGRESS NOTES
ASSESSMENT:  1. Gastric outlet obstruction    2. Kidney stone    3. Acute cystitis without hematuria        Dotty Villanueva is a 73 year old female who is s/p lap band removal on 3/13/24.     PLAN:  Full liquid diet for the next week and slowly advance to soft diet; continue on soft diet until sees Dr. Mabry  Home this afternoon if tolerated full liquid diet  Discharge recommendations placed    SUBJECTIVE:   She is doing well. Tolerated clear liquid. Pain controlled.       Patient Vitals for the past 24 hrs:   BP Temp Temp src Pulse Resp SpO2   03/14/24 0807 (!) 164/80 97.6  F (36.4  C) Oral 65 18 96 %   03/14/24 0405 (!) 174/81 98  F (36.7  C) Oral 61 16 97 %   03/14/24 0025 (!) 165/79 98.2  F (36.8  C) Oral 70 18 96 %   03/13/24 2224 (!) 174/81 -- -- 63 19 96 %   03/13/24 2147 (!) 178/82 98.6  F (37  C) Oral 67 20 97 %   03/13/24 2033 (!) 172/79 97.8  F (36.6  C) Oral -- 21 --   03/13/24 1634 (!) 168/77 -- -- -- -- --   03/13/24 1627 (!) 173/77 98.5  F (36.9  C) Oral 74 21 97 %   03/13/24 1500 (!) 181/74 -- -- 71 -- 96 %   03/13/24 1245 (!) 169/76 98.3  F (36.8  C) Oral 81 18 100 %         PHYSICAL EXAM:  GEN: No acute distress, comfortable  LUNGS: CTA bilaterally  CV:RRR  ABD:soft, not tender, not distended  EXT:No cyanosis, edema or obvious abnormalities    03/13 0700 - 03/14 0659  In: 1800 [I.V.:1800]  Out: 3700 [Urine:3700]    Admission on 03/11/2024   Component Date Value    ABO/RH(D) 03/11/2024 O POS     Antibody Screen 03/11/2024 Negative     SPECIMEN EXPIRATION DATE 03/11/2024 [id number]     Hold Specimen 03/11/2024 JIC     Hold Specimen 03/11/2024 JIC     GLUCOSE BY METER POCT 03/11/2024 79     GLUCOSE BY METER POCT 03/11/2024 88     GLUCOSE BY METER POCT 03/11/2024 132 (H)     GLUCOSE BY METER POCT 03/11/2024 269 (H)     Ventricular Rate 03/12/2024 83     Atrial Rate 03/12/2024 83     DC Interval 03/12/2024 200     QRS Duration 03/12/2024 100     QT 03/12/2024 394     QTc 03/12/2024 462     P  Axis 03/12/2024 56     R AXIS 03/12/2024 -40     T Axis 03/12/2024 31     Interpretation ECG 03/12/2024                      Value:Sinus rhythm with 1st degree A-V block with occasional Premature ventricular complexes or  Fusion complexes  Left axis deviation  Minimal voltage criteria for LVH, may be normal variant  Abnormal ECG  When compared with ECG of 25-APR-2023 10:35,  Fusion complexes  or PVC's  are now Present    Criteria for Septal infarct are no longer Present  Confirmed by RAFFI LEIGH MD LOC: ([id number]) on 3/12/2024 4:54:35 PM      TSH 03/12/2024 1.42     Lactic Acid 03/12/2024 3.4 (H)     Troponin T, High Sensiti* 03/12/2024 14     Sodium 03/12/2024 136     Potassium 03/12/2024 4.4     Carbon Dioxide (CO2) 03/12/2024 24     Anion Gap 03/12/2024 9     Urea Nitrogen 03/12/2024 11.4     Creatinine 03/12/2024 0.96 (H)     GFR Estimate 03/12/2024 62     Calcium 03/12/2024 10.1     Chloride 03/12/2024 103     Glucose 03/12/2024 202 (H)     Alkaline Phosphatase 03/12/2024 64     AST 03/12/2024 11     ALT 03/12/2024 9     Protein Total 03/12/2024 5.8 (L)     Albumin 03/12/2024 3.1 (L)     Bilirubin Total 03/12/2024 0.5     Culture 03/12/2024 No growth after 2 days     Culture 03/12/2024 No growth after 2 days     Color Urine 03/12/2024 Colorless     Appearance Urine 03/12/2024 Clear     Glucose Urine 03/12/2024 Negative     Bilirubin Urine 03/12/2024 Negative     Ketones Urine 03/12/2024 Negative     Specific Gravity Urine 03/12/2024 1.005     Blood Urine 03/12/2024 >1.0 mg/dL (A)     pH Urine 03/12/2024 7.5 (H)     Protein Albumin Urine 03/12/2024 Negative     Urobilinogen Urine 03/12/2024 <2.0     Nitrite Urine 03/12/2024 Negative     Leukocyte Esterase Urine 03/12/2024 75 Job/uL (A)     Bacteria Urine 03/12/2024 Few (A)     Mucus Urine 03/12/2024 Present (A)     Amorphous Crystals Urine 03/12/2024 Few (A)     RBC Urine 03/12/2024 175 (H)     WBC Urine 03/12/2024 11 (H)     Squamous Epithelials Uri* 03/12/2024  <1     Hyaline Casts Urine 03/12/2024 6 (H)     Magnesium 03/12/2024 1.8     Procalcitonin 03/12/2024 0.03     Lactic Acid 03/12/2024 3.3 (H)     WBC Count 03/12/2024 8.9     RBC Count 03/12/2024 4.05     Hemoglobin 03/12/2024 11.6 (L)     Hematocrit 03/12/2024 36.2     MCV 03/12/2024 89     MCH 03/12/2024 28.6     MCHC 03/12/2024 32.0     RDW 03/12/2024 13.2     Platelet Count 03/12/2024 405     % Neutrophils 03/12/2024 84     % Lymphocytes 03/12/2024 8     % Monocytes 03/12/2024 7     % Eosinophils 03/12/2024 0     % Basophils 03/12/2024 0     % Immature Granulocytes 03/12/2024 1     NRBCs per 100 WBC 03/12/2024 0     Absolute Neutrophils 03/12/2024 7.5     Absolute Lymphocytes 03/12/2024 0.8     Absolute Monocytes 03/12/2024 0.6     Absolute Eosinophils 03/12/2024 0.0     Absolute Basophils 03/12/2024 0.0     Absolute Immature Granul* 03/12/2024 0.1     Absolute NRBCs 03/12/2024 0.0     Culture 03/12/2024 No Growth     Lactic Acid 03/12/2024 1.7     GLUCOSE BY METER POCT 03/12/2024 170 (H)     Hold Specimen 03/12/2024 JIC     GLUCOSE BY METER POCT 03/12/2024 108 (H)     GLUCOSE BY METER POCT 03/12/2024 84     GLUCOSE BY METER POCT 03/12/2024 107 (H)     Sodium 03/13/2024 138     Potassium 03/13/2024 3.7     Chloride 03/13/2024 107     Carbon Dioxide (CO2) 03/13/2024 23     Anion Gap 03/13/2024 8     Urea Nitrogen 03/13/2024 8.5     Creatinine 03/13/2024 0.93     GFR Estimate 03/13/2024 65     Calcium 03/13/2024 9.6     Glucose 03/13/2024 79     WBC Count 03/13/2024 9.3     RBC Count 03/13/2024 4.06     Hemoglobin 03/13/2024 11.7     Hematocrit 03/13/2024 36.8     MCV 03/13/2024 91     MCH 03/13/2024 28.8     MCHC 03/13/2024 31.8     RDW 03/13/2024 13.2     Platelet Count 03/13/2024 368     INR 03/13/2024 1.11     GLUCOSE BY METER POCT 03/13/2024 75     GLUCOSE BY METER POCT 03/13/2024 215 (H)     Sodium 03/14/2024 137     Potassium 03/14/2024 4.1     Chloride 03/14/2024 104     Carbon Dioxide (CO2) 03/14/2024 31  (H)     Anion Gap 03/14/2024 2 (L)     Urea Nitrogen 03/14/2024 7.4 (L)     Creatinine 03/14/2024 0.85     GFR Estimate 03/14/2024 72     Calcium 03/14/2024 9.5     Glucose 03/14/2024 180 (H)     WBC Count 03/14/2024 9.4     RBC Count 03/14/2024 3.79 (L)     Hemoglobin 03/14/2024 10.8 (L)     Hematocrit 03/14/2024 34.2 (L)     MCV 03/14/2024 90     MCH 03/14/2024 28.5     MCHC 03/14/2024 31.6     RDW 03/14/2024 13.3     Platelet Count 03/14/2024 356     % Neutrophils 03/14/2024 82     % Lymphocytes 03/14/2024 10     % Monocytes 03/14/2024 8     % Eosinophils 03/14/2024 0     % Basophils 03/14/2024 0     % Immature Granulocytes 03/14/2024 0     NRBCs per 100 WBC 03/14/2024 0     Absolute Neutrophils 03/14/2024 7.6     Absolute Lymphocytes 03/14/2024 1.0     Absolute Monocytes 03/14/2024 0.8     Absolute Eosinophils 03/14/2024 0.0     Absolute Basophils 03/14/2024 0.0     Absolute Immature Granul* 03/14/2024 0.0     Absolute NRBCs 03/14/2024 0.0     GLUCOSE BY METER POCT 03/14/2024 169 (H)     GLUCOSE BY METER POCT 03/13/2024 241 (H)           Ada Grijalva, APRN CNP

## 2024-03-14 NOTE — PLAN OF CARE
Problem: Surgery Nonspecified  Goal: Absence of Bleeding  Outcome: Progressing  Goal: Effective Bowel Elimination  Outcome: Progressing  Goal: Fluid and Electrolyte Balance  Outcome: Progressing  Goal: Blood Glucose Level Within Targeted Range  Outcome: Progressing  Goal: Absence of Infection Signs and Symptoms  Outcome: Progressing  Goal: Anesthesia/Sedation Recovery  Outcome: Progressing  Goal: Optimal Pain Control and Function  Outcome: Progressing  Goal: Nausea and Vomiting Relief  Outcome: Progressing  Goal: Effective Urinary Elimination  Outcome: Progressing  Goal: Effective Oxygenation and Ventilation  Outcome: Progressing   Goal Outcome Evaluation:      Pt denies pain and nausea. Lap sites D/I x 5. Left back drsg D/I. Bowel sounds active, passing flatus. Vss, 96% RA. Oliveira with pink urine. Oliveira will be removed in am. Diet advanced.

## 2024-03-15 LAB
APPEARANCE STONE: NORMAL
COMPN STONE: NORMAL
SPECIMEN WT: 1670 MG

## 2024-03-17 LAB
BACTERIA BLD CULT: NO GROWTH
BACTERIA BLD CULT: NO GROWTH

## 2024-03-21 LAB
PATH REPORT.COMMENTS IMP SPEC: NORMAL
PATH REPORT.COMMENTS IMP SPEC: NORMAL
PATH REPORT.FINAL DX SPEC: NORMAL
PATH REPORT.GROSS SPEC: NORMAL
PATH REPORT.MICROSCOPIC SPEC OTHER STN: NORMAL
PATH REPORT.RELEVANT HX SPEC: NORMAL
PHOTO IMAGE: NORMAL

## 2024-03-21 PROCEDURE — 88300 SURGICAL PATH GROSS: CPT | Mod: 26 | Performed by: PATHOLOGY

## 2024-04-03 ENCOUNTER — OFFICE VISIT (OUTPATIENT)
Dept: SURGERY | Facility: CLINIC | Age: 74
End: 2024-04-03
Payer: MEDICARE

## 2024-04-03 VITALS — BODY MASS INDEX: 25.07 KG/M2 | DIASTOLIC BLOOD PRESSURE: 72 MMHG | SYSTOLIC BLOOD PRESSURE: 138 MMHG | WEIGHT: 141.5 LBS

## 2024-04-03 DIAGNOSIS — K31.1 GASTRIC OUTLET OBSTRUCTION: Primary | ICD-10-CM

## 2024-04-03 PROCEDURE — 99024 POSTOP FOLLOW-UP VISIT: CPT | Performed by: SURGERY

## 2024-04-03 RX ORDER — ATORVASTATIN CALCIUM 40 MG/1
TABLET, FILM COATED ORAL
COMMUNITY
Start: 2024-02-06

## 2024-04-03 RX ORDER — HYDROCHLOROTHIAZIDE 12.5 MG/1
1 TABLET ORAL DAILY
COMMUNITY

## 2024-04-03 NOTE — LETTER
4/3/2024         RE: Dotty Villanueva  [address] [place] [address]        Dear Colleague,    Thank you for referring your patient, Dotty Villanueva, to the [facility]. Please see a copy of my visit note below.    HPI: Pt is here for follow up of from the removal of a VBG and EGD with Dilation.   She is doing very well.  Pain is well controlled.  No difficulties with the surgical wound/wounds.  she is eating well and denies fever and chills.         /72   Wt 64.2 kg (141 lb 8 oz)   BMI 25.07 kg/m      EXAM:  GENERAL:Appears well  ABDOMEN:  Soft, +BS  SURGICAL WOUNDS:  Incisions healing well, no enduration or drainage.          Assessment/Plan:  Doing well after surgery and should follow up as needed.    Melecio Mabry MD ,MD  [facility]: Department of Surgery       Again, thank you for allowing me to participate in the care of your patient.        Sincerely,        Melecio Mabry MD

## 2024-04-03 NOTE — PROGRESS NOTES
HPI: Pt is here for follow up of from the removal of a VBG and EGD with Dilation.   She is doing very well.  Pain is well controlled.  No difficulties with the surgical wound/wounds.  she is eating well and denies fever and chills.         /72   Wt 64.2 kg (141 lb 8 oz)   BMI 25.07 kg/m      EXAM:  GENERAL:Appears well  ABDOMEN:  Soft, +BS  SURGICAL WOUNDS:  Incisions healing well, no enduration or drainage.          Assessment/Plan:  Doing well after surgery and should follow up as needed.    Melecio Mabry MD ,MD  [facility]: Department of Surgery

## (undated) DEVICE — SYR 10ML LL W/O NDL 302995

## (undated) DEVICE — SOL NACL 0.9% INJ 500ML BAG 2B1323Q

## (undated) DEVICE — SOL WATER IRRIG 1000ML BOTTLE 2F7114

## (undated) DEVICE — Device

## (undated) DEVICE — DRAPE SHEET TABLE COVER KC 42301*

## (undated) DEVICE — TUBING SUCTION MEDI-VAC 1/4"X20' N620A

## (undated) DEVICE — ENDO TROCAR OPTICAL ACCESS KII Z-THRD 05X100MM CTR03

## (undated) DEVICE — JELLY LUBRICATING SURGILUBE 2OZ TUBE

## (undated) DEVICE — SU VICRYL+ 0 54 UNDYED VCP608H

## (undated) DEVICE — ADH LIQUID MASTISOL TOPICAL VIAL 2-3ML 0523-48

## (undated) DEVICE — CATH FOGARTY 5FR OTW .035 80CM

## (undated) DEVICE — PROBE TRILOGY KIT 3.9FR X 440MM M0068403550

## (undated) DEVICE — SYR 50ML LL W/O NDL 309653

## (undated) DEVICE — SU VICRYL+ 3-0 27IN SH UND VCP416H

## (undated) DEVICE — DRAPE CRANIOTOMY W/POUCH 9450

## (undated) DEVICE — INFLATION DEVICE BIG 60 ENDO-AN6012

## (undated) DEVICE — KIT ENDO FIRST STEP DISINFECTANT 200ML W/POUCH EP-4

## (undated) DEVICE — ENDO CATH ESOPHAGEAL BALLOON PET 18MM 000345

## (undated) DEVICE — SU MONOCRYL+ 4-0 18IN PS2 UND MCP496G

## (undated) DEVICE — GLOVE BIOGEL PI ORTHOPRO SZ 7.5 47675

## (undated) DEVICE — CATH ANGIO ANG GLIDE 5FRX65CM CG507

## (undated) DEVICE — POSITIONER HEAD ADULT FP-HEADCR

## (undated) DEVICE — ANTIFOG SOLUTION SEE SHARP 150M TROCAR SWABS 30978

## (undated) DEVICE — TUBING SMOKE EVAC PNEUMOCLEAR HIGH FLOW 0620050250

## (undated) DEVICE — GOWN LG DISP 9515

## (undated) DEVICE — TAPE ADH POROUS 3IN CURITY STD 7046C

## (undated) DEVICE — PREP CHLORAPREP 26ML TINTED HI-LITE ORANGE 930815

## (undated) DEVICE — ENDO SHEARS RENEW LAP ENDOCUT SCISSOR TIP 16.5MM 3142

## (undated) DEVICE — SUCTION MANIFOLD NEPTUNE 2 SYS 1 PORT 702-025-000

## (undated) DEVICE — MAT FLOOR SURGICAL 40X38 0702140238

## (undated) DEVICE — CATH BALLOON NEPHROMAX 30FRX12CM M0062101180

## (undated) DEVICE — MEDALLION SYRINGE

## (undated) DEVICE — SOL NACL 0.9% IRRIG 1000ML BOTTLE 2F7124

## (undated) DEVICE — DRAPE SHEET REV FOLD 3/4 9349

## (undated) DEVICE — POSITIONER ARMBOARD FOAM 1PAIR LF FP-ARMB1

## (undated) DEVICE — SOL RINGERS LACTATED 1000ML BAG 2B2324X

## (undated) DEVICE — GLOVE BIOGEL PI ULTRATOUCH G SZ 8.0 42180

## (undated) DEVICE — ESU PENCIL SMOKE EVAC W/ROCKER SWITCH 0703-047-000

## (undated) DEVICE — GOWN XXL 9575

## (undated) DEVICE — CUSTOM PACK DRAINAGE TRAY SER5BDRHEB

## (undated) DEVICE — ESU GROUND PAD ADULT REM W/15' CORD E7507DB

## (undated) DEVICE — SOLUTION IRRIG 2B7127 .9NS 3000ML BAG

## (undated) DEVICE — PREP DYNA-HEX 4% CHG SCRUB 4OZ BOTTLE MDS098710

## (undated) DEVICE — ENDO TROCAR FIRST ENTRY KII FIOS Z-THRD 12X100MM CTF73

## (undated) DEVICE — BLADE KNIFE SURG 11 371111

## (undated) DEVICE — FORCEP BIOPSY 2.3MM DISP COATED 000388

## (undated) DEVICE — ADAPTER CATH CHECK-FLO 9FR FLL 050885 G15476

## (undated) DEVICE — TUBING IRRIG TUR Y TYPE 96" LF 6543-01

## (undated) DEVICE — GUIDEWIRE BENTSON FLEX TIP 0.035"X150CM M0066201250

## (undated) DEVICE — TUBING LAP/SUCT IRRIG DAVOL 0026880

## (undated) DEVICE — CUSTOM PACK LAP CHOLE SBA5BLCHEA

## (undated) DEVICE — ENDO TROCAR SLEEVE KII Z-THREADED 12X100MM CTS22

## (undated) DEVICE — SYR 30ML LL W/O NDL 302832

## (undated) DEVICE — ESU HARMONIC ACE LAP SHEARS STRYKER ACE+ 7 5MMX36CM HARH36

## (undated) DEVICE — CUSHION INSERT LG PRONE VIEW JACKSON TABLE

## (undated) DEVICE — GLOVE BIOGEL PI ULTRATOUCH G SZ 6.5 42165

## (undated) DEVICE — ADAPTOR CHECK FLO 050805-TWSL

## (undated) DEVICE — ENDO TROCAR SLEEVE KII Z-THREADED 05X100MM CTS02

## (undated) DEVICE — SUCTION MANIFOLD NEPTUNE 2 SYS 4 PORT 0702-020-000

## (undated) DEVICE — TUBING SET THERMEDX UROLOGY SGL USE LL0006

## (undated) DEVICE — BAG URINARY DRAIN 2000ML LF 154002

## (undated) DEVICE — SPONGE RAY-TEC 4X8" 7318

## (undated) DEVICE — NEEDLE SPINAL DISP 22GA X 3.5" QUINCKE 333320

## (undated) DEVICE — TRAY PREP DRY SKIN SCRUB 067

## (undated) DEVICE — GOWN XLG DISP 9545

## (undated) DEVICE — DRSG STERI STRIP 1/2X4" R1547

## (undated) DEVICE — CATH BALLOON PYLORIC 15MM 000848

## (undated) RX ORDER — BUPIVACAINE HYDROCHLORIDE 2.5 MG/ML
INJECTION, SOLUTION EPIDURAL; INFILTRATION; INTRACAUDAL
Status: DISPENSED
Start: 2024-03-13

## (undated) RX ORDER — FENTANYL CITRATE 50 UG/ML
INJECTION, SOLUTION INTRAMUSCULAR; INTRAVENOUS
Status: DISPENSED
Start: 2023-11-01

## (undated) RX ORDER — LIDOCAINE HYDROCHLORIDE 10 MG/ML
INJECTION, SOLUTION EPIDURAL; INFILTRATION; INTRACAUDAL; PERINEURAL
Status: DISPENSED
Start: 2024-03-13

## (undated) RX ORDER — ONDANSETRON 2 MG/ML
INJECTION INTRAMUSCULAR; INTRAVENOUS
Status: DISPENSED
Start: 2024-03-11

## (undated) RX ORDER — LIDOCAINE HYDROCHLORIDE 10 MG/ML
INJECTION, SOLUTION EPIDURAL; INFILTRATION; INTRACAUDAL; PERINEURAL
Status: DISPENSED
Start: 2024-03-11

## (undated) RX ORDER — PROPOFOL 10 MG/ML
INJECTION, EMULSION INTRAVENOUS
Status: DISPENSED
Start: 2024-03-13

## (undated) RX ORDER — DEXAMETHASONE SODIUM PHOSPHATE 10 MG/ML
INJECTION, SOLUTION INTRAMUSCULAR; INTRAVENOUS
Status: DISPENSED
Start: 2024-03-11

## (undated) RX ORDER — KETOROLAC TROMETHAMINE 30 MG/ML
INJECTION, SOLUTION INTRAMUSCULAR; INTRAVENOUS
Status: DISPENSED
Start: 2024-03-11

## (undated) RX ORDER — FENTANYL CITRATE 50 UG/ML
INJECTION, SOLUTION INTRAMUSCULAR; INTRAVENOUS
Status: DISPENSED
Start: 2024-03-13

## (undated) RX ORDER — EPHEDRINE SULFATE 50 MG/ML
INJECTION, SOLUTION INTRAMUSCULAR; INTRAVENOUS; SUBCUTANEOUS
Status: DISPENSED
Start: 2024-03-13

## (undated) RX ORDER — DEXAMETHASONE SODIUM PHOSPHATE 10 MG/ML
INJECTION, SOLUTION INTRAMUSCULAR; INTRAVENOUS
Status: DISPENSED
Start: 2024-03-13

## (undated) RX ORDER — ONDANSETRON 2 MG/ML
INJECTION INTRAMUSCULAR; INTRAVENOUS
Status: DISPENSED
Start: 2024-03-13

## (undated) RX ORDER — PROPOFOL 10 MG/ML
INJECTION, EMULSION INTRAVENOUS
Status: DISPENSED
Start: 2024-03-11

## (undated) RX ORDER — LIDOCAINE HYDROCHLORIDE 10 MG/ML
INJECTION, SOLUTION EPIDURAL; INFILTRATION; INTRACAUDAL; PERINEURAL
Status: DISPENSED
Start: 2023-11-01

## (undated) RX ORDER — FENTANYL CITRATE 50 UG/ML
INJECTION, SOLUTION INTRAMUSCULAR; INTRAVENOUS
Status: DISPENSED
Start: 2024-03-11

## (undated) RX ORDER — GLYCOPYRROLATE 0.2 MG/ML
INJECTION, SOLUTION INTRAMUSCULAR; INTRAVENOUS
Status: DISPENSED
Start: 2024-03-11

## (undated) RX ORDER — ONDANSETRON 2 MG/ML
INJECTION INTRAMUSCULAR; INTRAVENOUS
Status: DISPENSED
Start: 2023-11-01

## (undated) RX ORDER — PROPOFOL 10 MG/ML
INJECTION, EMULSION INTRAVENOUS
Status: DISPENSED
Start: 2023-11-01

## (undated) RX ORDER — FENTANYL CITRATE-0.9 % NACL/PF 10 MCG/ML
PLASTIC BAG, INJECTION (ML) INTRAVENOUS
Status: DISPENSED
Start: 2024-03-11